# Patient Record
Sex: FEMALE | Race: WHITE | Employment: UNEMPLOYED | URBAN - METROPOLITAN AREA
[De-identification: names, ages, dates, MRNs, and addresses within clinical notes are randomized per-mention and may not be internally consistent; named-entity substitution may affect disease eponyms.]

---

## 2017-06-01 ENCOUNTER — OFFICE VISIT (OUTPATIENT)
Dept: INTERNAL MEDICINE CLINIC | Age: 47
End: 2017-06-01

## 2017-06-01 VITALS
SYSTOLIC BLOOD PRESSURE: 112 MMHG | OXYGEN SATURATION: 99 % | RESPIRATION RATE: 14 BRPM | HEIGHT: 66 IN | DIASTOLIC BLOOD PRESSURE: 75 MMHG | TEMPERATURE: 97.9 F | WEIGHT: 205 LBS | HEART RATE: 71 BPM | BODY MASS INDEX: 32.95 KG/M2

## 2017-06-01 DIAGNOSIS — G43.B0 OPHTHALMOPLEGIC MIGRAINE, NOT INTRACTABLE: ICD-10-CM

## 2017-06-01 DIAGNOSIS — Z63.79 STRESSFUL LIFE EVENT AFFECTING FAMILY: ICD-10-CM

## 2017-06-01 DIAGNOSIS — R51.9 FREQUENT HEADACHES: Primary | ICD-10-CM

## 2017-06-01 DIAGNOSIS — F43.10 PTSD (POST-TRAUMATIC STRESS DISORDER): ICD-10-CM

## 2017-06-01 RX ORDER — DIVALPROEX SODIUM 125 MG/1
125 TABLET, DELAYED RELEASE ORAL 3 TIMES DAILY
Qty: 60 TAB | Refills: 2
Start: 2017-06-01 | End: 2017-06-23 | Stop reason: DRUGHIGH

## 2017-06-01 RX ORDER — SUMATRIPTAN 20 MG/1
SPRAY NASAL
Qty: 1 CONTAINER | Refills: 2 | Status: SHIPPED | OUTPATIENT
Start: 2017-06-01 | End: 2017-11-27 | Stop reason: ALTCHOICE

## 2017-06-01 RX ORDER — DIAZEPAM 5 MG/1
5 TABLET ORAL
COMMUNITY
End: 2017-06-23 | Stop reason: ALTCHOICE

## 2017-06-01 NOTE — PROGRESS NOTES
1. Have you been to the ER, urgent care clinic since your last visit? Hospitalized since your last visit? Yes, 3/2017, x1 week, [facility] in [place], [place].    2. Have you seen or consulted any other health care providers outside of the [address] [place] [address] since your last visit? Include any pap smears or colon screening. See above. Chief Complaint   Patient presents with    Headache     Intermittent x2 weeks, Tylenol with minimal relief. Worse with really bright lights. Not fasting. Does not have GYN care, so no paps. Has not had TDaP or Pneumonia.

## 2017-06-01 NOTE — PATIENT INSTRUCTIONS
Recurring Migraine Headache: Care Instructions  Your Care Instructions  Migraines are painful, throbbing headaches. They often start on one side of the head. They may cause nausea and vomiting and make you sensitive to light, sound, or smell. Some people may have only a few migraines throughout life. Others have them as often as several times a month. The goal of treatment is to reduce the number of migraines you have and relieve your symptoms. Even with treatment, you may continue to have migraines. You play an important role in dealing with your headaches. Work on avoiding things that seem to trigger your migraines. When you feel a headache coming on, act quickly to stop it before it gets worse. Follow-up care is a key part of your treatment and safety. Be sure to make and go to all appointments, and call your doctor if you are having problems. It's also a good idea to know your test results and keep a list of the medicines you take. How can you care for yourself at home? · Do not drive if you have taken a prescription pain medicine. · Rest in a quiet, dark room until your headache is gone. Close your eyes and try to relax or go to sleep. Do not watch TV or read. · Put a cold, moist cloth or cold pack on the painful area for 10 to 20 minutes at a time. Put a thin cloth between the cold pack and your skin. · Have someone gently massage your neck and shoulders. · Take your medicines exactly as prescribed. Call your doctor if you think you are having a problem with your medicine. You will get more details on the specific medicines your doctor prescribes. To prevent migraines  · Keep a headache diary so you can figure out what triggers your headaches. Avoiding triggers may help you prevent headaches. Record when each headache began, how long it lasted, and what the pain was like. Use words like throbbing, aching, stabbing, or dull. Write down any other symptoms you had with the headache.  These may include nausea, flashing lights or dark spots, or sensitivity to bright light or loud noise. Note if the headache occurred near your period. List anything that might have triggered the headache. Triggers may include certain foods (chocolate, cheese, wine) or odors, smoke, bright light, stress, or lack of sleep. · If your doctor has prescribed medicine for your migraines, take it as directed. You may have medicine that you take only when you get a migraine and medicine that you take all the time to help prevent migraines. ¨ If your doctor has prescribed medicine for when you get a headache, take it at the first sign of a migraine, unless your doctor has given you other instructions. ¨ If your doctor has prescribed medicine to prevent migraines, take it exactly as prescribed. Call your doctor if you think you are having a problem with your medicine. · Find healthy ways to deal with stress. Migraines are most common during or right after stressful times. Take time to relax before and after you do something that has caused a migraine in the past.  · Try to keep your muscles relaxed by keeping good posture. Check your jaw, face, neck, and shoulder muscles for tension. Try to relax them. When sitting at a desk, change positions often. Stretch for 30 seconds each hour. · Get regular sleep and exercise. · Eat regular meals, and avoid foods and drinks that often trigger migraines. These include chocolate and alcohol, especially red wine and port. Chemicals used in food, such as aspartame and monosodium glutamate (MSG), also can trigger migraines. So can some food additives, such as those found in hot dogs, parrish, cold cuts, aged cheeses, and pickled foods. · Limit caffeine by not drinking too much coffee, tea, or soda. Do not quit caffeine suddenly, because that can also give you migraines. · Do not smoke or allow others to smoke around you.  If you need help quitting, talk to your doctor about stop-smoking programs and medicines. These can increase your chances of quitting for good. · If you are taking birth control pills or hormone therapy, talk to your doctor about whether they are triggering your migraines. When should you call for help? Call 911 anytime you think you may need emergency care. For example, call if:  · You have symptoms of a stroke. These may include:  ¨ Sudden numbness, tingling, weakness, or loss of movement in your face, arm, or leg, especially on only one side of your body. ¨ Sudden vision changes. ¨ Sudden trouble speaking. ¨ Sudden confusion or trouble understanding simple statements. ¨ Sudden problems with walking or balance. ¨ A sudden, severe headache that is different from past headaches. Call your doctor now or seek immediate medical care if:  · You develop a fever and a stiff neck. · You have new nausea and vomiting, or you cannot keep down food or liquids. Watch closely for changes in your health, and be sure to contact your doctor if:  · You have a headache that does not get better within 1 or 2 days. · Your headaches get worse or happen more often. Where can you learn more? Go to http://daisy-millicent.info/. Enter V975 in the search box to learn more about \"Recurring Migraine Headache: Care Instructions. \"  Current as of: October 14, 2016  Content Version: 11.2  © 0862-8113 [employer]. Care instructions adapted under license by [employer] (which disclaims liability or warranty for this information). If you have questions about a medical condition or this instruction, always ask your healthcare professional. [place] [address] any warranty or liability for your use of this information. Sumatriptan (Into the nose)   Sumatriptan (sabino-ma-TRIP-tan)  Treats migraines. Brand Name(s): Imitrex, Onzetra Xsail   There may be other brand names for this medicine. When This Medicine Should Not Be Used:    This medicine is not right for everyone. Do not use it if you had an allergic reaction to sumatriptan. Tell your doctor if you have heart disease or blood circulation problems, including heart rhythm problems, ischemic bowel disease, peripheral vascular disease, or a history of heart attack, angina, stroke, or transient ischemic attack (TIA). How to Use This Medicine:   Powder, Spray  · Your doctor will tell you how much medicine to use. Do not use more than directed. Use sumatriptan only when you have a migraine. · Read and follow the patient instructions that come with this medicine. Talk to your doctor or pharmacist if you have any questions. · This medicine is for use only in the nose. Do not get any of it in your eyes or on your skin. If it does get on these areas, rinse it off right away. · Spray: Use 1 spray of medicine for 1 dose, unless your doctor tells you to use more. · Powder:   ¨ This medicine is given through a nosepiece with the Xsail breath-powered delivery device. You will throw away each nosepiece after you use it, but you will keep the device to use again. ¨ Your healthcare provider will teach you how to use the nosepiece and device. ¨ Follow the directions to put the nosepiece into the device until you hear it click. ¨ Press the white button all the way down one time, and then release it. This will open the capsule that contains the medicine powder. ¨ Insert the nosepiece into your nose, and then rotate the device around to your mouth. ¨ Blow into the mouthpiece for 2 or 3 seconds. Your breath will blow the powder up into your nose. ¨ Press the clear tab and take the nosepiece out of the device. ¨ Repeat the same steps with another nosepiece in your other nostril. · If your headache improves but then comes back, you may use a second dose. Wait at least 2 hours before you take the second dose. Call your doctor if your headache does not improve at all after the first dose.   · Talk with your doctor if you need to treat more than 4 headaches in any 30-day period. · Keep the bottle tightly closed when not using it. Store at room temperature, away from heat and direct light. Do not store in the refrigerator or freezer. Drugs and Foods to Avoid:   Ask your doctor or pharmacist before using any other medicine, including over-the-counter medicines, vitamins, and herbal products. · Do not use this medicine if you have taken another triptan or ergot medicine in the past 24 hours (including almotriptan, dihydroergotamine, eletriptan, ergotamine, frovatriptan, methysergide, naratriptan, rizatriptan, or zolmitriptan). · Do not use this medicine if you have used an MAO inhibitor in the past 2 weeks. · Some medicines can affect how sumatriptan works. Tell your doctor if you are using medicine for depression. Warnings While Using This Medicine:   · Tell your doctor if you are pregnant, or if you have kidney disease, liver disease, diabetes, epilepsy, high blood pressure, or high cholesterol. Tell your doctor if you smoke or you have a family history of heart disease. · Do not breastfeed for 12 hours after you use this medicine. · This medicine may cause the following problems:  ¨ Higher risk for heart rhythm problems, heart attack, or stroke  ¨ Tightness or discomfort in your chest, neck, or jaw  ¨ Spasms in the blood vessels, including Raynaud syndrome  ¨ Serotonin syndrome (more likely if used with medicine to treat depression)  ¨ High blood pressure  · This medicine may make you dizzy or drowsy. Do not drive or do anything that could be dangerous until you know how this medicine affects you. · Your headaches may become worse if you use headache medicine for 10 or more days per month. Call your doctor if your symptoms do not improve or if they get worse. · Keep all medicine out of the reach of children. Never share your medicine with anyone.   Possible Side Effects While Using This Medicine:   Call your doctor right away if you notice any of these side effects:  · Allergic reaction: Itching or hives, swelling in your face or hands, swelling or tingling in your mouth or throat, chest tightness, trouble breathing  · Anxiety, restlessness, sweating, muscle spasms, twitching, diarrhea, seeing or hearing things that are not there  · Chest pain, especially if it spreads to your arms, jaw, back, or neck, trouble breathing, unusual sweating, faintness  · Color changes and cold feeling in your toes or fingers  · Fast, pounding, or uneven heartbeat  · Numbness, tingling, cramps, unexplained pain in your legs or feet  · Severe stomach pain, bloody diarrhea, nausea, or vomiting  · Sudden severe headache (other than the one being treated)  · Vision changes that are not part of a usual migraine  If you notice these less serious side effects, talk with your doctor:   · Bad or unusual taste in your mouth  · Burning or tingling in your nose  · Drowsiness, tiredness, dizziness  If you notice other side effects that you think are caused by this medicine, tell your doctor. Call your doctor for medical advice about side effects. You may report side effects to FDA at 9-259-FDA-2596  © 2017 [facility] Information is for End User's use only and may not be sold, redistributed or otherwise used for commercial purposes. The above information is an  only. It is not intended as medical advice for individual conditions or treatments. Talk to your doctor, nurse or pharmacist before following any medical regimen to see if it is safe and effective for you.

## 2017-06-01 NOTE — PROGRESS NOTES
Ms. Norm Min is a 48y.o. year old female who had concerns including Headache. HPI:  Chief Complaint   Patient presents with    Headache     Intermittent x2 weeks, Tylenol with minimal relief. Worse with really bright lights. May last 3-4 hours, someimes longer. No prior hx of migraines. Not fasting. Does not have GYN care, so no paps. Has not had TDaP or Pneumonia. Seen by  Dr Britta Wheeler for psychiaitry- [facility]. Counselor- Navid Griffith       Past Medical History:   Diagnosis Date    Back pain     Borderline personality disorder     Dysthymic disorder     Insomnia, unspecified     Post traumatic stress disorder (PTSD)     Psychosis     PUD (peptic ulcer disease)     Schizoaffective disorder (HCC)      Current Outpatient Prescriptions   Medication Sig Dispense    diazePAM (VALIUM) 5 mg tablet Take 5 mg by mouth every eight (8) hours as needed for Anxiety.  SUMAtriptan (IMITREX) 20 mg/actuation nasal spray 1 spray each nostril, Repeat after 2 hours prn, Maximum: 40 mg/day. 1 Container    divalproex DR (DEPAKOTE) 125 mg tablet Take 1 Tab by mouth three (3) times daily. Take 125mg Breakfast, lunch, then Take 250 mg at PM dose 60 Tab    ziprasidone (GEODON) 20 mg capsule Take 20 mg by mouth two (2) times daily (with meals).  benztropine (COGENTIN) 0.5 mg tablet Take 0.5 mg by mouth two (2) times a day.  BUPROPION HCL (WELLBUTRIN SR PO) Take 150 mg by mouth daily.  cloZAPine (CLOZARIL) 100 mg tablet Take  mg by mouth two (2) times a day. Take 50 mg in the morning and 100 mg at bedtime     lithium carbonate 300 mg tablet Take 1 Tab by mouth daily. Indications: mood stabilization (Patient taking differently: Take 300 mg by mouth two (2) times a day. Indications: mood stabilization) 7 Tab    lithium carbonate 300 mg tablet Take 2 Tabs by mouth nightly.  Indications: mood stabilization 14 Tab    prazosin (MINIPRESS) 2 mg capsule Take 1 Cap by mouth nightly. Indications: CHRONIC PTSD WITH TRAUMA NIGHTMARES 7 Cap     No current facility-administered medications for this visit. Reviewed PmHx, RxHx, FmHx, SocHx, AllgHx and updated and dated in the chart. ROS: Negative except for BOLD  General: fever, chills, fatigue  Respiratory: cough, SOB, wheezing  Cardiovascular:  CP, palpitation, BROWN, edema   Gastrointestinal: N/V/D, bleeding  Genito-Urinary: dysuria, hematuria  Musculoskeletal: muscle weakness, pain, swelling    OBJECTIVE:   Visit Vitals    /75 (BP 1 Location: Right arm, BP Patient Position: Sitting)    Pulse 71    Temp 97.9 °F (36.6 °C) (Oral)    Resp 14    Ht 5' 6\" (1.676 m)    Wt 205 lb (93 kg)    LMP 05/17/2017 (Approximate)    SpO2 99%    BMI 33.09 kg/m2     GEN: The patient appears well, alert, oriented x 3, in no distress. ENT: bilateral TM and canal normal.  Neck supple. No adenopathy or thyromegaly. FAVIAN. Lungs: clear bilaterally, good air entry, no wheezes, rhonchi or rales. Cardiovascular: regular rate and rhythm. S1 and S2 normal, no murmurs,  Abdomen: + BS, soft without tenderness, guarding, rebound, mass or organomegaly. Extremities: no edema, normal peripheral pulses. Neurological: normal, gross sensory and motor in tact without focal findings. Assessment/ Plan:       ICD-10-CM ICD-9-CM    1. Frequent headaches R51 784.0 SUMAtriptan (IMITREX) 20 mg/actuation nasal spray      divalproex DR (DEPAKOTE) 125 mg tablet   2. Stressful life event affecting family Z63.79 V61.09    3. PTSD (post-traumatic stress disorder) F43.10 309.81    4. 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 B0 346.20 SUMAtriptan (IMITREX) 20 mg/actuation nasal spray      divalproex DR (DEPAKOTE) 125 mg tablet       I have discussed the diagnosis with the patient and the intended plan as seen in the above orders. The patient has received an after-visit summary and questions were answered concerning future plans.      Medication Side Effects and Warnings were discussed with patient. Follow-up Disposition:  Return in about 4 weeks (around 6/29/2017) for Headaches.       Juan C Barrera MD

## 2017-06-01 NOTE — MR AVS SNAPSHOT
Visit Information Date & Time Provider Department Dept. Phone Encounter #  
 6/1/2017  1:15 PM Penny Soni MD [facility] [id number] Follow-up Instructions Return in about 4 weeks (around 6/29/2017) for Headaches. Follow-up and Disposition History Your Appointments 6/29/2017  2:00 PM  
Any with [employer], MD  
[address] [place] [address] and Primary Care [address] [place] [address]) Appt Note: 4 week f/u  
 Darlene Cernaona [address] 1 [facility]  
  
   
 [address] [id number] Upcoming Health Maintenance Date Due DTaP/Tdap/Td series (1 - Tdap) 7/10/1989 PAP AKA CERVICAL CYTOLOGY 7/10/1989 Pneumococcal 19-64 Highest Risk (2 of 3 - PCV13) 3/16/2016 INFLUENZA AGE 9 TO ADULT 8/1/2017 Allergies as of 6/1/2017  Review Complete On: 6/1/2017 By: Penny Soni MD  
  
 Severity Noted Reaction Type Reactions Penicillin G High 10/01/2012   Systemic Anaphylaxis Aspirin  10/01/2012   Side Effect Nausea and Vomiting Pt states she isn't allergic to ASA she just can't take  due to gastric ulcers. Nsaids (Non-steroidal Anti-inflammatory Drug)  12/02/2013   Side Effect Unknown (comments) Upset stomach Current Immunizations  Reviewed on 3/17/2015 Name Date Influenza Vaccine PF 3/16/2015  5:12 AM  
 Pneumococcal Polysaccharide (PPSV-23) 3/16/2015  5:15 AM  
  
 Not reviewed this visit You Were Diagnosed With   
  
 Codes Comments Frequent headaches    -  Primary ICD-10-CM: R56 ICD-9-CM: 784.0 Stressful life event affecting family     ICD-10-CM: Z57.65 ICD-9-CM: V61.09   
 PTSD (post-traumatic stress disorder)     ICD-10-CM: F43.10 ICD-9-CM: 309.81 Ophthalmoplegic migraine, not intractable     ICD-10-CM: G43. B0 ICD-9-CM: 346.20 Vitals BP Pulse Temp Resp Height(growth percentile) Weight(growth percentile) 112/75 (BP 1 Location: Right arm, BP Patient Position: Sitting) 71 97.9 °F (36.6 °C) (Oral) 14 5' 6\" (1.676 m) 205 lb (93 kg) LMP SpO2 BMI OB Status Smoking Status 05/17/2017 (Approximate) 99% 33.09 kg/m2 Having regular periods Former Smoker Vitals History BMI and BSA Data Body Mass Index Body Surface Area 33.09 kg/m 2 2.08 m 2 Preferred Pharmacy Pharmacy Name Phone [facility] [address] [address] Your Updated Medication List  
  
   
This list is accurate as of: 6/1/17  2:26 PM.  Always use your most recent med list.  
  
  
  
  
 benztropine 0.5 mg tablet Commonly known as:  COGENTIN Take 0.5 mg by mouth two (2) times a day. cloZAPine 100 mg tablet Commonly known as:  CLOZARIL Take  mg by mouth two (2) times a day. Take 50 mg in the morning and 100 mg at bedtime  
  
 divalproex  mg tablet Commonly known as:  DEPAKOTE Take 1 Tab by mouth three (3) times daily. Take 125mg Breakfast, lunch, then Take 250 mg at PM dose * lithium carbonate 300 mg tablet Take 1 Tab by mouth daily. Indications: mood stabilization * lithium carbonate 300 mg tablet Take 2 Tabs by mouth nightly. Indications: mood stabilization  
  
 prazosin 2 mg capsule Commonly known as:  MINIPRESS Take 1 Cap by mouth nightly. Indications: CHRONIC PTSD WITH TRAUMA NIGHTMARES  
  
 SUMAtriptan 20 mg/actuation nasal spray Commonly known as:  IMITREX  
1 spray each nostril, Repeat after 2 hours prn, Maximum: 40 mg/day. VALIUM 5 mg tablet Generic drug:  diazePAM  
Take 5 mg by mouth every eight (8) hours as needed for Anxiety. WELLBUTRIN SR PO Take 150 mg by mouth daily. ziprasidone 20 mg capsule Commonly known as:  Becky Asp Take 20 mg by mouth two (2) times daily (with meals). * Notice:   This list has 2 medication(s) that are the same as other medications prescribed for you. Read the directions carefully, and ask your doctor or other care provider to review them with you. Prescriptions Sent to Pharmacy Refills SUMAtriptan (IMITREX) 20 mg/actuation nasal spray 2 Si spray each nostril, Repeat after 2 hours prn, Maximum: 40 mg/day. Class: Normal  
 Pharmacy: [address] #: [phone] Follow-up Instructions Return in about 4 weeks (around 2017) for Headaches. Patient Instructions Recurring Migraine Headache: Care Instructions Your Care Instructions Migraines are painful, throbbing headaches. They often start on one side of the head. They may cause nausea and vomiting and make you sensitive to light, sound, or smell. Some people may have only a few migraines throughout life. Others have them as often as several times a month. The goal of treatment is to reduce the number of migraines you have and relieve your symptoms. Even with treatment, you may continue to have migraines. You play an important role in dealing with your headaches. Work on avoiding things that seem to trigger your migraines. When you feel a headache coming on, act quickly to stop it before it gets worse. Follow-up care is a key part of your treatment and safety. Be sure to make and go to all appointments, and call your doctor if you are having problems. It's also a good idea to know your test results and keep a list of the medicines you take. How can you care for yourself at home? · Do not drive if you have taken a prescription pain medicine. · Rest in a quiet, dark room until your headache is gone. Close your eyes and try to relax or go to sleep. Do not watch TV or read. · Put a cold, moist cloth or cold pack on the painful area for 10 to 20 minutes at a time. Put a thin cloth between the cold pack and your skin. · Have someone gently massage your neck and shoulders. · Take your medicines exactly as prescribed. Call your doctor if you think you are having a problem with your medicine. You will get more details on the specific medicines your doctor prescribes. To prevent migraines · Keep a headache diary so you can figure out what triggers your headaches. Avoiding triggers may help you prevent headaches. Record when each headache began, how long it lasted, and what the pain was like. Use words like throbbing, aching, stabbing, or dull. Write down any other symptoms you had with the headache. These may include nausea, flashing lights or dark spots, or sensitivity to bright light or loud noise. Note if the headache occurred near your period. List anything that might have triggered the headache. Triggers may include certain foods (chocolate, cheese, wine) or odors, smoke, bright light, stress, or lack of sleep. · If your doctor has prescribed medicine for your migraines, take it as directed. You may have medicine that you take only when you get a migraine and medicine that you take all the time to help prevent migraines. ¨ If your doctor has prescribed medicine for when you get a headache, take it at the first sign of a migraine, unless your doctor has given you other instructions. ¨ If your doctor has prescribed medicine to prevent migraines, take it exactly as prescribed. Call your doctor if you think you are having a problem with your medicine. · Find healthy ways to deal with stress. Migraines are most common during or right after stressful times. Take time to relax before and after you do something that has caused a migraine in the past. 
· Try to keep your muscles relaxed by keeping good posture. Check your jaw, face, neck, and shoulder muscles for tension. Try to relax them. When sitting at a desk, change positions often. Stretch for 30 seconds each hour. · Get regular sleep and exercise.  
· Eat regular meals, and avoid foods and drinks that often trigger migraines. These include chocolate and alcohol, especially red wine and port. Chemicals used in food, such as aspartame and monosodium glutamate (MSG), also can trigger migraines. So can some food additives, such as those found in hot dogs, parrish, cold cuts, aged cheeses, and pickled foods. · Limit caffeine by not drinking too much coffee, tea, or soda. Do not quit caffeine suddenly, because that can also give you migraines. · Do not smoke or allow others to smoke around you. If you need help quitting, talk to your doctor about stop-smoking programs and medicines. These can increase your chances of quitting for good. · If you are taking birth control pills or hormone therapy, talk to your doctor about whether they are triggering your migraines. When should you call for help? Call 911 anytime you think you may need emergency care. For example, call if: 
· You have symptoms of a stroke. These may include: 
¨ Sudden numbness, tingling, weakness, or loss of movement in your face, arm, or leg, especially on only one side of your body. ¨ Sudden vision changes. ¨ Sudden trouble speaking. ¨ Sudden confusion or trouble understanding simple statements. ¨ Sudden problems with walking or balance. ¨ A sudden, severe headache that is different from past headaches. Call your doctor now or seek immediate medical care if: 
· You develop a fever and a stiff neck. · You have new nausea and vomiting, or you cannot keep down food or liquids. Watch closely for changes in your health, and be sure to contact your doctor if: 
· You have a headache that does not get better within 1 or 2 days. · Your headaches get worse or happen more often. Where can you learn more? Go to http://daisy-millicent.info/. Enter V975 in the search box to learn more about \"Recurring Migraine Headache: Care Instructions. \" Current as of: October 14, 2016 Content Version: 11.2 © 5730-2297 [employer]. Care instructions adapted under license by [employer] (which disclaims liability or warranty for this information). If you have questions about a medical condition or this instruction, always ask your healthcare professional. [address] any warranty or liability for your use of this information. Sumatriptan (Into the nose) Sumatriptan (sabino-ma-TRIP-tan) Treats migraines. Brand Name(s): Imitrex, Dotty Deeds There may be other brand names for this medicine. When This Medicine Should Not Be Used: This medicine is not right for everyone. Do not use it if you had an allergic reaction to sumatriptan. Tell your doctor if you have heart disease or blood circulation problems, including heart rhythm problems, ischemic bowel disease, peripheral vascular disease, or a history of heart attack, angina, stroke, or transient ischemic attack (TIA). How to Use This Medicine:  
Powder, Spray · Your doctor will tell you how much medicine to use. Do not use more than directed. Use sumatriptan only when you have a migraine. · Read and follow the patient instructions that come with this medicine. Talk to your doctor or pharmacist if you have any questions. · This medicine is for use only in the nose. Do not get any of it in your eyes or on your skin. If it does get on these areas, rinse it off right away. · Spray: Use 1 spray of medicine for 1 dose, unless your doctor tells you to use more. · Powder: ¨ This medicine is given through a nosepiece with the Xsail breath-powered delivery device. You will throw away each nosepiece after you use it, but you will keep the device to use again. ¨ Your healthcare provider will teach you how to use the nosepiece and device. ¨ Follow the directions to put the nosepiece into the device until you hear it click. ¨ Press the white button all the way down one time, and then release it. This will open the capsule that contains the medicine powder. ¨ Insert the nosepiece into your nose, and then rotate the device around to your mouth. ¨ Blow into the mouthpiece for 2 or 3 seconds. Your breath will blow the powder up into your nose. ¨ Press the clear tab and take the nosepiece out of the device. ¨ Repeat the same steps with another nosepiece in your other nostril. · If your headache improves but then comes back, you may use a second dose. Wait at least 2 hours before you take the second dose. Call your doctor if your headache does not improve at all after the first dose. · Talk with your doctor if you need to treat more than 4 headaches in any 30-day period. · Keep the bottle tightly closed when not using it. Store at room temperature, away from heat and direct light. Do not store in the refrigerator or freezer. Drugs and Foods to Avoid: Ask your doctor or pharmacist before using any other medicine, including over-the-counter medicines, vitamins, and herbal products. · Do not use this medicine if you have taken another triptan or ergot medicine in the past 24 hours (including almotriptan, dihydroergotamine, eletriptan, ergotamine, frovatriptan, methysergide, naratriptan, rizatriptan, or zolmitriptan). · Do not use this medicine if you have used an MAO inhibitor in the past 2 weeks. · Some medicines can affect how sumatriptan works. Tell your doctor if you are using medicine for depression. Warnings While Using This Medicine: · Tell your doctor if you are pregnant, or if you have kidney disease, liver disease, diabetes, epilepsy, high blood pressure, or high cholesterol. Tell your doctor if you smoke or you have a family history of heart disease. · Do not breastfeed for 12 hours after you use this medicine. · This medicine may cause the following problems: 
¨ Higher risk for heart rhythm problems, heart attack, or stroke ¨ Tightness or discomfort in your chest, neck, or jaw ¨ Spasms in the blood vessels, including Raynaud syndrome ¨ Serotonin syndrome (more likely if used with medicine to treat depression) ¨ High blood pressure · This medicine may make you dizzy or drowsy. Do not drive or do anything that could be dangerous until you know how this medicine affects you. · Your headaches may become worse if you use headache medicine for 10 or more days per month. Call your doctor if your symptoms do not improve or if they get worse. · Keep all medicine out of the reach of children. Never share your medicine with anyone. Possible Side Effects While Using This Medicine:  
Call your doctor right away if you notice any of these side effects: · Allergic reaction: Itching or hives, swelling in your face or hands, swelling or tingling in your mouth or throat, chest tightness, trouble breathing · Anxiety, restlessness, sweating, muscle spasms, twitching, diarrhea, seeing or hearing things that are not there · Chest pain, especially if it spreads to your arms, jaw, back, or neck, trouble breathing, unusual sweating, faintness · Color changes and cold feeling in your toes or fingers · Fast, pounding, or uneven heartbeat · Numbness, tingling, cramps, unexplained pain in your legs or feet · Severe stomach pain, bloody diarrhea, nausea, or vomiting · Sudden severe headache (other than the one being treated) · Vision changes that are not part of a usual migraine If you notice these less serious side effects, talk with your doctor: · Bad or unusual taste in your mouth · Burning or tingling in your nose · Drowsiness, tiredness, dizziness If you notice other side effects that you think are caused by this medicine, tell your doctor. Call your doctor for medical advice about side effects. You may report side effects to FDA at 0-177-FDA-9284 © 2017 [address] Information is for End User's use only and may not be sold, redistributed or otherwise used for commercial purposes. The above information is an  only. It is not intended as medical advice for individual conditions or treatments. Talk to your doctor, nurse or pharmacist before following any medical regimen to see if it is safe and effective for you. Patient Instructions History Introducing [facility]! Dear Amanda Talamantes: Thank you for requesting a [employer] account. Our records indicate that you already have an active [employer] account. You can access your account anytime at [email or web address] Did you know that you can access your hospital and ER discharge instructions at any time in [employer]? You can also review all of your test results from your hospital stay or ER visit. Additional Information If you have questions, please visit the Frequently Asked Questions section of the [employer] website at [email or web address] Remember, [employer] is NOT to be used for urgent needs. For medical emergencies, dial 911. Now available from your iPhone and Android! Please provide this summary of care documentation to your next provider. Your primary care clinician is listed as Young Sleeper. If you have any questions after today's visit, please call [phone].

## 2017-06-06 ENCOUNTER — OFFICE VISIT (OUTPATIENT)
Dept: INTERNAL MEDICINE CLINIC | Age: 47
End: 2017-06-06

## 2017-06-06 VITALS
HEART RATE: 81 BPM | BODY MASS INDEX: 33.25 KG/M2 | RESPIRATION RATE: 20 BRPM | HEIGHT: 66 IN | TEMPERATURE: 98.3 F | OXYGEN SATURATION: 98 % | DIASTOLIC BLOOD PRESSURE: 75 MMHG | SYSTOLIC BLOOD PRESSURE: 116 MMHG | WEIGHT: 206.9 LBS

## 2017-06-06 DIAGNOSIS — G43.B0 OPHTHALMOPLEGIC MIGRAINE, NOT INTRACTABLE: ICD-10-CM

## 2017-06-06 DIAGNOSIS — R51.9 FREQUENT HEADACHES: ICD-10-CM

## 2017-06-06 RX ORDER — SUMATRIPTAN 25 MG/1
TABLET, FILM COATED ORAL
Qty: 30 TAB | Refills: 3 | Status: SHIPPED | OUTPATIENT
Start: 2017-06-06 | End: 2017-06-23 | Stop reason: DRUGHIGH

## 2017-06-06 NOTE — MR AVS SNAPSHOT
Visit Information Date & Time Provider Department Dept. Phone Encounter #  
 6/6/2017  3:00 PM Sydni Hairston MD [facility] Sports Medicine and [place] [id number] Follow-up Instructions Return if symptoms worsen or fail to improve, for migraine follow up. Follow-up and Disposition History Your Appointments 6/29/2017  2:00 PM  
Any with Sydni Hairston MD  
[address] [place] [address] and Primary Care [facility]) Appt Note: 4 week f/u  
 [address] [place]  
  
   
 [address] [facility] [address] Upcoming Health Maintenance Date Due DTaP/Tdap/Td series (1 - Tdap) 7/10/1989 PAP AKA CERVICAL CYTOLOGY 7/10/1989 Pneumococcal 19-64 Highest Risk (2 of 3 - PCV13) 3/16/2016 INFLUENZA AGE 9 TO ADULT 8/1/2017 Allergies as of 6/6/2017  Review Complete On: 6/6/2017 By: Omkar Maurer LPN Severity Noted Reaction Type Reactions Penicillin G High 10/01/2012   Systemic Anaphylaxis Aspirin  10/01/2012   Side Effect Nausea and Vomiting Pt states she isn't allergic to ASA she just can't take  due to gastric ulcers. Nsaids (Non-steroidal Anti-inflammatory Drug)  12/02/2013   Side Effect Unknown (comments) Upset stomach Current Immunizations  Reviewed on 3/17/2015 Name Date Influenza Vaccine PF 3/16/2015  5:12 AM  
 Pneumococcal Polysaccharide (PPSV-23) 3/16/2015  5:15 AM  
  
 Not reviewed this visit You Were Diagnosed With   
  
 Codes Comments Frequent headaches     ICD-10-CM: R51 ICD-9-CM: 784.0 Ophthalmoplegic migraine, not intractable     ICD-10-CM: G43. B0 ICD-9-CM: 346.20 Vitals BP Pulse Temp Resp Height(growth percentile) Weight(growth percentile) 116/75 (BP 1 Location: Left arm, BP Patient Position: Sitting) 81 98.3 °F (36.8 °C) (Oral) 20 5' 6\" (1.676 m) 206 lb 14.4 oz (93.8 kg) LMP SpO2 BMI OB Status Smoking Status 05/17/2017 (Approximate) 98% 33.39 kg/m2 Having regular periods Former Smoker Vitals History BMI and BSA Data Body Mass Index Body Surface Area  
 33.39 kg/m 2 2.09 m 2 Preferred Pharmacy Pharmacy Name Phone [facility] [address], [address] [facility] [address] Your Updated Medication List  
  
   
This list is accurate as of: 6/6/17  5:24 PM.  Always use your most recent med list.  
  
  
  
  
 benztropine 0.5 mg tablet Commonly known as:  COGENTIN Take 0.5 mg by mouth two (2) times a day. cloZAPine 100 mg tablet Commonly known as:  CLOZARIL Take  mg by mouth two (2) times a day. 2 tabs @ hs  
  
 divalproex  mg tablet Commonly known as:  DEPAKOTE Take 1 Tab by mouth three (3) times daily. Take 125mg Breakfast, lunch, then Take 250 mg at PM dose  
  
 lithium carbonate 300 mg tablet Take 1 Tab by mouth daily. Indications: mood stabilization  
  
 prazosin 2 mg capsule Commonly known as:  MINIPRESS Take 1 Cap by mouth nightly. Indications: CHRONIC PTSD WITH TRAUMA NIGHTMARES  
  
 SUMAtriptan 20 mg/actuation nasal spray Commonly known as:  IMITREX  
1 spray each nostril, Repeat after 2 hours prn, Maximum: 40 mg/day. SUMAtriptan 25 mg tablet Commonly known as:  IMITREX Start 25 mg PO at onset of migraine, Repeat 25 to 100 mg PO every 2 hours prn utnil migraine stops. Max 300mg/day VALIUM 5 mg tablet Generic drug:  diazePAM  
Take 5 mg by mouth every eight (8) hours as needed for Anxiety. WELLBUTRIN SR PO Take 150 mg by mouth daily. ziprasidone 20 mg capsule Commonly known as:  Charissa Severe Take 20 mg by mouth two (2) times daily (with meals). Prescriptions Sent to Pharmacy Refills SUMAtriptan (IMITREX) 25 mg tablet 3 Sig: Start 25 mg PO at onset of migraine, Repeat 25 to 100 mg PO every 2 hours prn utnil migraine stops. Max 300mg/day  Class: Normal  
 Pharmacy: [address] [place] [address] #: [phone] Follow-up Instructions Return if symptoms worsen or fail to improve, for migraine follow up. Introducing [address]! Dear Tatyana Patel: Thank you for requesting a [employer] account. Our records indicate that you already have an active [employer] account. You can access your account anytime at [email or web address] Did you know that you can access your hospital and ER discharge instructions at any time in [employer]? You can also review all of your test results from your hospital stay or ER visit. Additional Information If you have questions, please visit the Frequently Asked Questions section of the [employer] website at [email or web address] Remember, [employer] is NOT to be used for urgent needs. For medical emergencies, dial 911. Now available from your iPhone and Android! Please provide this summary of care documentation to your next provider. Your primary care clinician is listed as Dionne Altman. If you have any questions after today's visit, please call [phone].

## 2017-06-06 NOTE — PROGRESS NOTES
Ms. Roberto Carlos [place] is a 48y.o. year old female who had concerns including Medication Evaluation and Head Pain. HPI:  Chief Complaint   Patient presents with    Medication Evaluation    Head Pain     eye, and right side head pain     Confused on medication use. Relief with imitrex, after 2nd dose. Used last 3 days      Past Medical History:   Diagnosis Date    Back pain     Borderline personality disorder     Dysthymic disorder     Insomnia, unspecified     Post traumatic stress disorder (PTSD)     Psychosis     PUD (peptic ulcer disease)     Schizoaffective disorder (HCC)      Current Outpatient Prescriptions   Medication Sig Dispense    SUMAtriptan (IMITREX) 25 mg tablet Start 25 mg PO at onset of migraine, Repeat 25 to 100 mg PO every 2 hours prn utnil migraine stops. Max 300mg/day 30 Tab    diazePAM (VALIUM) 5 mg tablet Take 5 mg by mouth every eight (8) hours as needed for Anxiety.  SUMAtriptan (IMITREX) 20 mg/actuation nasal spray 1 spray each nostril, Repeat after 2 hours prn, Maximum: 40 mg/day. 1 Container    divalproex DR (DEPAKOTE) 125 mg tablet Take 1 Tab by mouth three (3) times daily. Take 125mg Breakfast, lunch, then Take 250 mg at PM dose 60 Tab    ziprasidone (GEODON) 20 mg capsule Take 20 mg by mouth two (2) times daily (with meals).  benztropine (COGENTIN) 0.5 mg tablet Take 0.5 mg by mouth two (2) times a day.  BUPROPION HCL (WELLBUTRIN SR PO) Take 150 mg by mouth daily.  cloZAPine (CLOZARIL) 100 mg tablet Take  mg by mouth two (2) times a day. 2 tabs @ hs     lithium carbonate 300 mg tablet Take 1 Tab by mouth daily. Indications: mood stabilization (Patient taking differently: Take 300 mg by mouth two (2) times a day. Indications: mood stabilization) 7 Tab    prazosin (MINIPRESS) 2 mg capsule Take 1 Cap by mouth nightly. Indications: CHRONIC PTSD WITH TRAUMA NIGHTMARES (Patient taking differently: Take 2 mg by mouth nightly.  1 in the morning, 2 @ night Indications: CHRONIC PTSD WITH TRAUMA NIGHTMARES) 7 Cap     No current facility-administered medications for this visit. Reviewed PmHx, RxHx, FmHx, SocHx, AllgHx and updated and dated in the chart. ROS: Negative except for BOLD  General: fever, chills, fatigue  Respiratory: cough, SOB, wheezing  Cardiovascular:  CP, palpitation, BROWN, edema   Gastrointestinal: N/V/D, bleeding  Genito-Urinary: dysuria, hematuria  Musculoskeletal: muscle weakness, pain, swelling    OBJECTIVE:   Visit Vitals    /75 (BP 1 Location: Left arm, BP Patient Position: Sitting)    Pulse 81    Temp 98.3 °F (36.8 °C) (Oral)    Resp 20    Ht 5' 6\" (1.676 m)    Wt 206 lb 14.4 oz (93.8 kg)    LMP 05/17/2017 (Approximate)    SpO2 98%  Comment: RA    BMI 33.39 kg/m2     GEN: The patient appears well, alert, oriented x 3, in no distress. ENT: bilateral TM and canal normal.  Neck supple. No adenopathy or thyromegaly. FAVIAN. Lungs: clear bilaterally, good air entry, no wheezes, rhonchi or rales. Cardiovascular: regular rate and rhythm. S1 and S2 normal, no murmurs,  Abdomen: + BS, soft without tenderness, guarding, rebound, mass or organomegaly. Extremities: no edema, normal peripheral pulses. Neurological: normal, gross sensory and motor in tact without focal findings. Assessment/ Plan:       ICD-10-CM ICD-9-CM    1. Frequent headaches R51 784.0 SUMAtriptan (IMITREX) 25 mg tablet   2. 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 B0 346.20 SUMAtriptan (IMITREX) 25 mg tablet       250-50mg BID option for ppx migraine dose. Initial suggestion 250mg  AM and 125 lunch 250 mg PM - pt encouraged to call psychiatrist, dr Herminia Floyd  imitrext nasal spray with relief, used all in last 3 days. I have discussed the diagnosis with the patient and the intended plan as seen in the above orders. The patient has received an after-visit summary and questions were answered concerning future plans.      Medication Side Effects and Warnings were discussed with patient. Follow-up Disposition:  Return if symptoms worsen or fail to improve, for migraine follow up.       Sabina Ghosh MD

## 2017-06-12 ENCOUNTER — TELEPHONE (OUTPATIENT)
Dept: INTERNAL MEDICINE CLINIC | Age: 47
End: 2017-06-12

## 2017-06-12 NOTE — TELEPHONE ENCOUNTER
Spoke with Patient,    Advised she has a 30 day supply with three refills for Imitrex. Advised the expectation of use: not to use multiple pills daily. Advised if it gets to that point give us a call back for something else.

## 2017-06-13 ENCOUNTER — TELEPHONE (OUTPATIENT)
Dept: INTERNAL MEDICINE CLINIC | Age: 47
End: 2017-06-13

## 2017-06-13 NOTE — TELEPHONE ENCOUNTER
----- Message from Lahoma Galeazzi, LPN sent at 6/54/7316  2:57 PM EDT -----  Regarding: FW: Dr. Gleen Carrel      ----- Message -----     From: Hilma Krabbe     Sent: 6/5/2017  12:03 PM       To: Lahoma Galeazzi, LPN  Subject: FW: Dr. Gleen Carrel                            ----- Message -----     From: Vy Woods     Sent: 6/2/2017   5:48 PM       To: [facility]  Subject: Dr. Gleen Carrel                            Pt request to discuss proper usage and instructions for Rx \" Imitrex\" 20mg.  Best contact number [phone]

## 2017-06-13 NOTE — TELEPHONE ENCOUNTER
Spoke with patient and clarified directions on how to take Imitrex. She has one pill remaining and was prescribed on 06/06/17. Patient also states her therapist may be calling to discuss change in Depakote.

## 2017-06-23 ENCOUNTER — OFFICE VISIT (OUTPATIENT)
Dept: FAMILY MEDICINE CLINIC | Age: 47
End: 2017-06-23

## 2017-06-23 VITALS
HEART RATE: 84 BPM | OXYGEN SATURATION: 98 % | DIASTOLIC BLOOD PRESSURE: 62 MMHG | TEMPERATURE: 98 F | HEIGHT: 66 IN | SYSTOLIC BLOOD PRESSURE: 110 MMHG | RESPIRATION RATE: 20 BRPM | BODY MASS INDEX: 33.65 KG/M2 | WEIGHT: 209.4 LBS

## 2017-06-23 DIAGNOSIS — G44.009 ATYPICAL CLUSTER HEADACHE: ICD-10-CM

## 2017-06-23 DIAGNOSIS — G43.909 MIGRAINE WITHOUT STATUS MIGRAINOSUS, NOT INTRACTABLE, UNSPECIFIED MIGRAINE TYPE: Primary | ICD-10-CM

## 2017-06-23 RX ORDER — CLINDAMYCIN HYDROCHLORIDE 150 MG/1
150 CAPSULE ORAL 3 TIMES DAILY
COMMUNITY
Start: 2017-06-22 | End: 2017-07-05 | Stop reason: ALTCHOICE

## 2017-06-23 RX ORDER — DIVALPROEX SODIUM 250 MG/1
250 TABLET, DELAYED RELEASE ORAL 2 TIMES DAILY
COMMUNITY
Start: 2017-05-26 | End: 2018-12-03 | Stop reason: DRUGHIGH

## 2017-06-23 RX ORDER — CLONAZEPAM 0.5 MG/1
0.5 TABLET ORAL 3 TIMES DAILY
COMMUNITY
Start: 2017-06-20 | End: 2017-11-27 | Stop reason: ALTCHOICE

## 2017-06-23 RX ORDER — ZIPRASIDONE HYDROCHLORIDE 20 MG/1
20 CAPSULE ORAL 2 TIMES DAILY WITH MEALS
COMMUNITY
Start: 2017-05-26 | End: 2017-06-23 | Stop reason: DRUGHIGH

## 2017-06-23 RX ORDER — HYDROCODONE BITARTRATE AND ACETAMINOPHEN 7.5; 325 MG/1; MG/1
1 TABLET ORAL
COMMUNITY
Start: 2017-06-22 | End: 2017-07-05 | Stop reason: ALTCHOICE

## 2017-06-23 RX ORDER — PRAZOSIN HYDROCHLORIDE 1 MG/1
5 CAPSULE ORAL 2 TIMES DAILY
COMMUNITY
Start: 2017-06-14 | End: 2017-11-27 | Stop reason: DRUGHIGH

## 2017-06-23 RX ORDER — BENZTROPINE MESYLATE 1 MG/1
1 TABLET ORAL 2 TIMES DAILY
COMMUNITY
Start: 2017-06-21

## 2017-06-23 RX ORDER — ZIPRASIDONE HYDROCHLORIDE 40 MG/1
40 CAPSULE ORAL
COMMUNITY
Start: 2017-06-14 | End: 2018-10-30 | Stop reason: ALTCHOICE

## 2017-06-23 RX ORDER — SUMATRIPTAN 100 MG/1
100 TABLET, FILM COATED ORAL
Qty: 12 TAB | Refills: 2 | Status: SHIPPED | OUTPATIENT
Start: 2017-06-23 | End: 2017-07-27 | Stop reason: SDUPTHER

## 2017-06-23 NOTE — PROGRESS NOTES
Identified pt with two pt identifiers(name and ). Chief Complaint   Patient presents with    Headache     They started a month and a half ago and they come and go - they can be really painful. She wakes up with them    Memory Loss     talking to Dr Abdelrahman Gonzalez about her doing strange things and not remembering things    Stress     her father was hit and killed a few months ago on 58         There are no preventive care reminders to display for this patient. Wt Readings from Last 3 Encounters:   17 209 lb 6.4 oz (95 kg)   17 206 lb 14.4 oz (93.8 kg)   17 205 lb (93 kg)     Temp Readings from Last 3 Encounters:   17 98 °F (36.7 °C) (Oral)   17 98.3 °F (36.8 °C) (Oral)   17 97.9 °F (36.6 °C) (Oral)     BP Readings from Last 3 Encounters:   17 110/62   17 116/75   17 112/75     Pulse Readings from Last 3 Encounters:   17 84   17 81   17 71         Learning Assessment:  :     Learning Assessment 2013   PRIMARY LEARNER Patient Patient   HIGHEST LEVEL OF EDUCATION - PRIMARY LEARNER  - > 4 YEARS OF COLLEGE   BARRIERS PRIMARY LEARNER - NONE   CO-LEARNER CAREGIVER - No   PRIMARY LANGUAGE ENGLISH ENGLISH    NEED - No   LEARNER PREFERENCE PRIMARY READING LISTENING     LISTENING -   ANSWERED BY patient self   RELATIONSHIP SELF SELF   ASSESSMENT COMMENT - no       Depression Screening:  :     PHQ over the last two weeks 2017   PHQ Not Done Active Diagnosis of Depression or Bipolar Disorder   She is still dealing with dad's death     Fall Risk Assessment:  :     Fall Risk Assessment, last 12 mths 2017   Able to walk? Yes   Fall in past 12 months? No       Abuse Screening:  :     Abuse Screening Questionnaire 2017 8/15/2016   Do you ever feel afraid of your partner? N N   Are you in a relationship with someone who physically or mentally threatens you? N N   Is it safe for you to go home?  Y N       Coordination of Care Questionnaire:  :     1) Have you been to an emergency room, urgent care clinic since your last visit? no   Hospitalized since your last visit? no             2) Have you seen or consulted any other health care providers outside of [address] [place] [address] since your last visit? yes  Dr Magdalene Hutchinson and Dr Sophie Garcia DDS yesterday (Include any pap smears or colon screenings in this section.)    3) Do you have an Advance Directive on file? no  Are you interested in receiving information about Advance Directives? no    Reviewed record in preparation for visit and have obtained necessary documentation. Medication reconciliation up to date and corrected with patient at this time.    She said nothing is helping headaches

## 2017-06-23 NOTE — MR AVS SNAPSHOT
Visit Information Date & Time Provider Department Dept. Phone Encounter #  
 [id number]  7:13 PM Radha Simmons [place] [phone] [id number] Your Appointments 6/29/2017  2:00 PM  
Any with [employer], MD  
[address] [place] [address] and Primary Care [facility]) Appt Note: 4 week f/u  
 [address] [facility]  
  
   
 [id number] Upcoming Health Maintenance Date Due INFLUENZA AGE 9 TO ADULT 8/1/2017 Pneumococcal 19-64 Highest Risk (3 of 3 - PCV13) 6/23/2018 PAP AKA CERVICAL CYTOLOGY 6/23/2020 DTaP/Tdap/Td series (2 - Td) 6/23/2027 Allergies as of 6/23/2017  Review Complete On: 6/69/9858 By: Radha Simmons MD  
  
 Severity Noted Reaction Type Reactions Penicillin G High 10/01/2012   Systemic Anaphylaxis Aspirin  10/01/2012   Side Effect Nausea and Vomiting Pt states she isn't allergic to ASA she just can't take  due to gastric ulcers. Nsaids (Non-steroidal Anti-inflammatory Drug)  12/02/2013   Side Effect Unknown (comments) Upset stomach Current Immunizations  Reviewed on 3/17/2015 Name Date Influenza Vaccine PF 3/16/2015  5:12 AM  
 Pneumococcal Polysaccharide (PPSV-23) 3/16/2015  5:15 AM  
  
 Not reviewed this visit You Were Diagnosed With   
  
 Codes Comments Migraine without status migrainosus, not intractable, unspecified migraine type    -  Primary ICD-10-CM: P66.037 ICD-9-CM: 346.90 Atypical cluster headache     ICD-10-CM: G44.009 ICD-9-CM: 339.00 Vitals BP Pulse Temp Resp Height(growth percentile) Weight(growth percentile) 110/62 (BP 1 Location: Left arm, BP Patient Position: Sitting) 84 98 °F (36.7 °C) (Oral) 20 5' 6\" (1.676 m) 209 lb 6.4 oz (95 kg) LMP SpO2 BMI OB Status Smoking Status 06/16/2017 98% 33.8 kg/m2 Having regular periods Former Smoker Vitals History BMI and BSA Data Body Mass Index Body Surface Area  
 33.8 kg/m 2 2.1 m 2 Preferred Pharmacy Pharmacy Name Phone [facility] # [id number] Britni Ceron, [address] [place] [address] [phone] Your Updated Medication List  
  
   
This list is accurate as of: 6/23/17  5:06 PM.  Always use your most recent med list.  
  
  
  
  
 benztropine 1 mg tablet Commonly known as:  COGENTIN Take 1 mg by mouth two (2) times a day. clindamycin 150 mg capsule Commonly known as:  CLEOCIN Take 150 mg by mouth three (3) times daily. clonazePAM 0.5 mg tablet Commonly known as:  Beto Less Take 0.5 mg by mouth three (3) times daily. cloZAPine 100 mg tablet Commonly known as:  CLOZARIL Take  mg by mouth two (2) times a day. 2 tabs @ hs  
  
 divalproex  mg tablet Commonly known as:  DEPAKOTE Take 250 mg by mouth three (3) times daily. HYDROcodone-acetaminophen 7.5-325 mg per tablet Commonly known as:  Kriste Kei Take 1 Tab by mouth every six (6) hours as needed. lithium carbonate 300 mg tablet Take 1 Tab by mouth daily. Indications: mood stabilization  
  
 prazosin 1 mg capsule Commonly known as:  MINIPRESS Take 1 mg by mouth daily. SUMAtriptan 100 mg tablet Commonly known as:  IMITREX Take 1 Tab by mouth once as needed for Migraine. May repeat 1 tablet after 2 hours. MAX 2 tabs a day SUMAtriptan 20 mg/actuation nasal spray Commonly known as:  IMITREX  
1 spray each nostril, Repeat after 2 hours prn, Maximum: 40 mg/day. WELLBUTRIN SR PO Take 150 mg by mouth daily. ziprasidone 40 mg capsule Commonly known as:  Parth Lang Prescriptions Sent to Pharmacy Refills SUMAtriptan (IMITREX) 100 mg tablet 2 Sig: Take 1 Tab by mouth once as needed for Migraine. May repeat 1 tablet after 2 hours. MAX 2 tabs a day Class: Normal  
 Pharmacy: [facility] # [id number] [address], [address] [facility] #: [phone] Route: Oral  
  
To-Do List   
 06/23/2017 Imaging:  CT HEAD WO CONT Introducing [facility] & HEALTH SERVICES! Dear Kulwinder Dior: Thank you for requesting a [facility] account. Our records indicate that you already have an active [facility]VIP account. You can access your account anytime at [email or web address] Did you know that you can access your hospital and ER discharge instructions at any time in CardioVIP? You can also review all of your test results from your hospital stay or ER visit. Additional Information If you have questions, please visit the Frequently Asked Questions section of the [employer]ioVIP website at [email or web address] Remember, CardioVIP is NOT to be used for urgent needs. For medical emergencies, dial 911. Now available from your iPhone and Android! Please provide this summary of care documentation to your next provider. Your primary care clinician is listed as Sheila Mcneil. If you have any questions after today's visit, please call [phone].

## 2017-06-24 NOTE — PROGRESS NOTES
HISTORY OF PRESENT ILLNESS  Olivia Bangura is a 48 y.o. female. HPI  Pt present to re-establish medical care at [facility]. She is now living in [facility] to care for her mother. Her father had tragic accidental death 2 months ago. C/O frequent headaches ever since his death. Described as \"blinding\" pain start behind on eye, then radiates up to top and sides of head. Some nausea, no vomiting. No photophobia. She gets about 3 times a week. Has recently been prescribed Imitrex NS and Imitrex 25 mg tab with good relief of HA. But she has not taken meds properly. Ran out of Imitrex. Dr. Ashish Davis has recently increased dose of Depakote to try to help with migraine prophylaxis. She is followed by psychiatry Dr. Ashish Davis and is on numerous psychotropic medicines. Pt is not sure about medicine dosages. She seems to get prescriptions filled at different pharmacies. For example: she has 2 different doses of Geodon being filled at 2 different pharmacies. She will call Dr. Ashish Davis on Monday to clarify medicine list and doses. Pt also concerned about some memory problems. I am concerned it is because of incorrect medicine dosages. Review of Systems   Neurological: Positive for headaches. Psychiatric/Behavioral: Positive for depression and memory loss. All other systems reviewed and are negative. Visit Vitals    /62 (BP 1 Location: Left arm, BP Patient Position: Sitting)    Pulse 84    Temp 98 °F (36.7 °C) (Oral)    Resp 20    Ht 5' 6\" (1.676 m)    Wt 209 lb 6.4 oz (95 kg)    LMP 06/16/2017    SpO2 98%    BMI 33.8 kg/m2       Physical Exam   Constitutional: She appears well-developed and well-nourished. HENT:   Right Ear: External ear normal.   Left Ear: External ear normal.   Mouth/Throat: Oropharynx is clear and moist.   Eyes: Conjunctivae and EOM are normal.   Neck: Normal range of motion. Vitals reviewed. ASSESSMENT and PLAN    ICD-10-CM ICD-9-CM    1.  Migraine without status migrainosus, not intractable, unspecified migraine type G43.909 346.90 CT HEAD WO CONT      SUMAtriptan (IMITREX) 100 mg tablet   2. Atypical cluster headache G44.009 339.00      Increase dose Imitrex 100 mg tab for acute treatment of migraine. We reviewed how to take Imitrex properly. Order CT head to rule out other cause of frequent headaches. Need to review med list with [address].

## 2017-06-29 ENCOUNTER — HOSPITAL ENCOUNTER (OUTPATIENT)
Dept: CT IMAGING | Age: 47
Discharge: HOME | End: 2017-06-29
Attending: FAMILY MEDICINE
Payer: COMMERCIAL

## 2017-06-29 DIAGNOSIS — G43.909 MIGRAINE WITHOUT STATUS MIGRAINOSUS, NOT INTRACTABLE, UNSPECIFIED MIGRAINE TYPE: ICD-10-CM

## 2017-06-29 PROCEDURE — 70450 CT HEAD/BRAIN W/O DYE: CPT

## 2017-06-30 ENCOUNTER — TELEPHONE (OUTPATIENT)
Dept: FAMILY MEDICINE CLINIC | Age: 47
End: 2017-06-30

## 2017-06-30 NOTE — TELEPHONE ENCOUNTER
Pt needs a call back from nurse and stated the headaches are getting worse and more frequent and needs a call

## 2017-06-30 NOTE — TELEPHONE ENCOUNTER
She was glad the CT scan was normal. She has appt on 7/12/17 with Dr Kandace Piña. She will talk to him about it.

## 2017-07-05 ENCOUNTER — OFFICE VISIT (OUTPATIENT)
Dept: FAMILY MEDICINE CLINIC | Age: 47
End: 2017-07-05

## 2017-07-05 VITALS
HEART RATE: 91 BPM | TEMPERATURE: 98.2 F | BODY MASS INDEX: 33.46 KG/M2 | HEIGHT: 66 IN | RESPIRATION RATE: 20 BRPM | OXYGEN SATURATION: 97 % | WEIGHT: 208.2 LBS | DIASTOLIC BLOOD PRESSURE: 70 MMHG | SYSTOLIC BLOOD PRESSURE: 107 MMHG

## 2017-07-05 DIAGNOSIS — G43.909 MIGRAINE WITHOUT STATUS MIGRAINOSUS, NOT INTRACTABLE, UNSPECIFIED MIGRAINE TYPE: Primary | ICD-10-CM

## 2017-07-05 RX ORDER — BUPROPION HYDROCHLORIDE 150 MG/1
150 TABLET, EXTENDED RELEASE ORAL
COMMUNITY
Start: 2017-06-26 | End: 2018-08-28 | Stop reason: ALTCHOICE

## 2017-07-05 RX ORDER — IBUPROFEN 600 MG/1
600 TABLET ORAL
Qty: 30 TAB | Refills: 0 | Status: SHIPPED | OUTPATIENT
Start: 2017-07-05 | End: 2018-03-12

## 2017-07-05 RX ORDER — PRAZOSIN HYDROCHLORIDE 2 MG/1
2 CAPSULE ORAL
COMMUNITY
Start: 2017-06-14 | End: 2017-11-27 | Stop reason: ALTCHOICE

## 2017-07-05 NOTE — PROGRESS NOTES
Identified pt with two pt identifiers(name and ). Chief Complaint   Patient presents with    Headache     5 days out of the week she has a headache that last 3 hours to bedtime - they started when her father     Stress     takes care of her mom - 4 knee surgeries and then amputated [place]        There are no preventive care reminders to display for this patient. Wt Readings from Last 3 Encounters:   17 208 lb 3.2 oz (94.4 kg)   17 209 lb 6.4 oz (95 kg)   17 206 lb 14.4 oz (93.8 kg)     Temp Readings from Last 3 Encounters:   17 98.2 °F (36.8 °C) (Oral)   17 98 °F (36.7 °C) (Oral)   17 98.3 °F (36.8 °C) (Oral)     BP Readings from Last 3 Encounters:   17 107/70   17 110/62   17 116/75     Pulse Readings from Last 3 Encounters:   17 91   17 84   17 81         Learning Assessment:  :     Learning Assessment 2013   PRIMARY LEARNER Patient Patient   HIGHEST LEVEL OF EDUCATION - PRIMARY LEARNER  - > 4 YEARS OF COLLEGE   BARRIERS PRIMARY LEARNER - NONE   CO-LEARNER CAREGIVER - No   PRIMARY LANGUAGE ENGLISH ENGLISH    NEED - No   LEARNER PREFERENCE PRIMARY READING LISTENING     LISTENING -   ANSWERED BY patient self   RELATIONSHIP SELF SELF   ASSESSMENT COMMENT - no       Depression Screening:  :     PHQ over the last two weeks 2017   PHQ Not Done Active Diagnosis of Depression or Bipolar Disorder       Fall Risk Assessment:  :     Fall Risk Assessment, last 12 mths 2017   Able to walk? Yes   Fall in past 12 months? No       Abuse Screening:  :     Abuse Screening Questionnaire 2017 8/15/2016   Do you ever feel afraid of your partner? N N   Are you in a relationship with someone who physically or mentally threatens you? N N   Is it safe for you to go home?  Y N       Coordination of Care Questionnaire:  :     1) Have you been to an emergency room, urgent care clinic since your last visit? no Hospitalized since your last visit? no             2) Have you seen or consulted any other health care providers outside of [address] [place] [address] since your last visit? no  (Include any pap smears or colon screenings in this section.)    3) Do you have an Advance Directive on file? no  Are you interested in receiving information about Advance Directives? no    Reviewed record in preparation for visit and have obtained necessary documentation. Medication reconciliation up to date and corrected with patient at this time.

## 2017-07-05 NOTE — MR AVS SNAPSHOT
Visit Information Date & Time Provider Department Dept. Phone Encounter #  
 4/3/3616  9:80 PM Haider QuintanaYoung [phone] [id number] Upcoming Health Maintenance Date Due INFLUENZA AGE 9 TO ADULT 8/1/2017 Pneumococcal 19-64 Highest Risk (3 of 3 - PCV13) 6/23/2018 PAP AKA CERVICAL CYTOLOGY 6/23/2020 DTaP/Tdap/Td series (2 - Td) 6/23/2027 Allergies as of 7/5/2017  Review Complete On: 5/6/8018 By: Haider Quintana MD  
  
 Severity Noted Reaction Type Reactions Penicillin G High 10/01/2012   Systemic Anaphylaxis Aspirin  10/01/2012   Side Effect Nausea and Vomiting Pt states she isn't allergic to ASA she just can't take  due to gastric ulcers. Current Immunizations  Reviewed on 3/17/2015 Name Date Influenza Vaccine PF 3/16/2015  5:12 AM  
 Pneumococcal Polysaccharide (PPSV-23) 3/16/2015  5:15 AM  
  
 Not reviewed this visit You Were Diagnosed With   
  
 Codes Comments Migraine without status migrainosus, not intractable, unspecified migraine type    -  Primary ICD-10-CM: Q69.966 ICD-9-CM: 346.90 Vitals BP Pulse Temp Resp Height(growth percentile) Weight(growth percentile) 107/70 (BP 1 Location: Right arm, BP Patient Position: Sitting) 91 98.2 °F (36.8 °C) (Oral) 20 5' 6\" (1.676 m) 208 lb 3.2 oz (94.4 kg) LMP SpO2 BMI OB Status Smoking Status 06/16/2017 97% 33.6 kg/m2 Having regular periods Former Smoker Vitals History BMI and BSA Data Body Mass Index Body Surface Area  
 33.6 kg/m 2 2.1 m 2 Preferred Pharmacy Pharmacy Name Phone [facility] [id number] [place] [address] [place] [address] [phone] Your Updated Medication List  
  
   
This list is accurate as of: 7/5/17  3:07 PM.  Always use your most recent med list.  
  
  
  
  
 benztropine 1 mg tablet Commonly known as:  COGENTIN Take 1 mg by mouth two (2) times a day. buPROPion  mg SR tablet Commonly known as:  Sirena Murphy Take 150 mg by mouth every morning. clonazePAM 0.5 mg tablet Commonly known as:  Lemuel Gouty Take 0.5 mg by mouth three (3) times daily. cloZAPine 100 mg tablet Commonly known as:  CLOZARIL Take 200 mg by mouth nightly. 2 tabs @ hs  
  
 divalproex  mg tablet Commonly known as:  DEPAKOTE Take 250 mg by mouth two (2) times a day. ibuprofen 600 mg tablet Commonly known as:  MOTRIN Take 1 Tab by mouth every eight (8) hours as needed for Pain.  
  
 lithium carbonate 300 mg tablet Take 1 Tab by mouth daily. Indications: mood stabilization * prazosin 1 mg capsule Commonly known as:  MINIPRESS Take 1 mg by mouth two (2) times a day. * prazosin 2 mg capsule Commonly known as:  MINIPRESS Take 2 mg by mouth nightly. SUMAtriptan 100 mg tablet Commonly known as:  IMITREX Take 1 Tab by mouth once as needed for Migraine. May repeat 1 tablet after 2 hours. MAX 2 tabs a day SUMAtriptan 20 mg/actuation nasal spray Commonly known as:  IMITREX  
1 spray each nostril, Repeat after 2 hours prn, Maximum: 40 mg/day. ziprasidone 40 mg capsule Commonly known as:  Johnisatu Mary Take 40 mg by mouth two (2) times daily (with meals). * Notice: This list has 2 medication(s) that are the same as other medications prescribed for you. Read the directions carefully, and ask your doctor or other care provider to review them with you. Prescriptions Sent to Pharmacy Refills  
 ibuprofen (MOTRIN) 600 mg tablet 0 Sig: Take 1 Tab by mouth every eight (8) hours as needed for Pain. Class: Normal  
 [facility] [address] [address], [address] [facility] #: [phone] Route: Oral  
  
We Performed the Following REFERRAL TO NEUROLOGY [BIU61 Custom] Comments:  
 Please evaluate patient for frequent migraines. Referral Information Referral ID Referred By Referred To [id number] [facility], [address] [place] [place], MD [address], [address] [facility] Phone: [phone] Fax: [phone] Visits Status Start Date End Date 1 New Request 7/5/17 7/5/18 If your referral has a status of pending review or denied, additional information will be sent to support the outcome of this decision. Introducing [facility] & HEALTH SERVICES! Dear [facility]: Thank you for requesting a [facility] SERVICES account. Our records indicate that you already have an active [facility] account. You can access your account anytime at [email or web address] Did you know that you can access your hospital and ER discharge instructions at any time in [facility]? You can also review all of your test results from your hospital stay or ER visit. Additional Information If you have questions, please visit the Frequently Asked Questions section of the [facility] website at [email or web address] Remember, [facility] SERVICES is NOT to be used for urgent needs. For medical emergencies, dial 911. Now available from your iPhone and Android! Please provide this summary of care documentation to your next provider. Your primary care clinician is listed as Magali Clarke. If you have any questions after today's visit, please call [phone].

## 2017-07-05 NOTE — PROGRESS NOTES
HISTORY OF PRESENT ILLNESS  Paras Casarez is a 48 y.o. female. HPI  FU frequent migraines. At last visit I increased dose Imitrex 100 mg tab. She has found these helpful but insurance only allows 9 tabs a month and she has headaches 5 out of 7 days a week!!.  CT scan of head was negative. She also brought in medicines from psychiatrist Dr. Leyda Reza today. He decreased dose of Depakote to BID. Review of Systems   Neurological: Positive for headaches. Psychiatric/Behavioral: Positive for depression. The patient is nervous/anxious. All other systems reviewed and are negative. Current Outpatient Prescriptions   Medication Sig Dispense Refill    buPROPion SR (WELLBUTRIN SR) 150 mg SR tablet Take 150 mg by mouth every morning.  prazosin (MINIPRESS) 2 mg capsule Take 2 mg by mouth nightly.  ibuprofen (MOTRIN) 600 mg tablet Take 1 Tab by mouth every eight (8) hours as needed for Pain. 30 Tab 0    ziprasidone (GEODON) 40 mg capsule Take 40 mg by mouth two (2) times daily (with meals).  benztropine (COGENTIN) 1 mg tablet Take 1 mg by mouth two (2) times a day.  divalproex DR (DEPAKOTE) 250 mg tablet Take 250 mg by mouth two (2) times a day.  clonazePAM (KLONOPIN) 0.5 mg tablet Take 0.5 mg by mouth three (3) times daily.  prazosin (MINIPRESS) 1 mg capsule Take 1 mg by mouth two (2) times a day.  cloZAPine (CLOZARIL) 100 mg tablet Take 200 mg by mouth nightly. 2 tabs @ hs      lithium carbonate 300 mg tablet Take 1 Tab by mouth daily. Indications: mood stabilization (Patient taking differently: Take 300 mg by mouth two (2) times a day. take one twice a day  Indications: mood stabilization) 7 Tab 0    SUMAtriptan (IMITREX) 100 mg tablet Take 1 Tab by mouth once as needed for Migraine. May repeat 1 tablet after 2 hours. MAX 2 tabs a day 12 Tab 2    SUMAtriptan (IMITREX) 20 mg/actuation nasal spray 1 spray each nostril, Repeat after 2 hours prn, Maximum: 40 mg/day.  1 Container 2 Patient Active Problem List   Diagnosis Code    Borderline personality disorder F60.3    PUD (peptic ulcer disease) K27.9    PTSD (post-traumatic stress disorder) F43.10    Overdose of muscle relaxant T48.201A    Suicidal intent R45.851    Airway compromise J98.8    Leukocytosis D72.829    Acute respiratory failure (HCC) J96.00    Normocytic anemia D64.9    Toxic encephalopathy G92    Schizoaffective disorder (HCC) F25.9    Cough R05    Suicidal overdose (HCC) T50.902A    Lower abdominal pain R10.30    Gastroesophageal reflux disease without esophagitis K21.9         Visit Vitals    /70 (BP 1 Location: Right arm, BP Patient Position: Sitting)    Pulse 91    Temp 98.2 °F (36.8 °C) (Oral)    Resp 20    Ht 5' 6\" (1.676 m)    Wt 208 lb 3.2 oz (94.4 kg)    LMP 06/16/2017    SpO2 97%    BMI 33.6 kg/m2       Physical Exam   Constitutional: She appears well-developed and well-nourished. Vitals reviewed. ASSESSMENT and PLAN    ICD-10-CM ICD-9-CM    1. Migraine without status migrainosus, not intractable, unspecified migraine type G43.909 346.90 REFERRAL TO NEUROLOGY      ibuprofen (MOTRIN) 600 mg tablet     Will refer to Neurology. She requested Ibuprofen for headaches. It is listed as an allergy but she states it sometimes upsets stomach. I warned her to take with food.

## 2017-07-14 ENCOUNTER — OFFICE VISIT (OUTPATIENT)
Dept: NEUROLOGY | Age: 47
End: 2017-07-14

## 2017-07-14 VITALS
HEART RATE: 84 BPM | OXYGEN SATURATION: 99 % | WEIGHT: 210 LBS | BODY MASS INDEX: 33.89 KG/M2 | DIASTOLIC BLOOD PRESSURE: 66 MMHG | SYSTOLIC BLOOD PRESSURE: 122 MMHG

## 2017-07-14 DIAGNOSIS — G31.84 MILD COGNITIVE IMPAIRMENT: ICD-10-CM

## 2017-07-14 DIAGNOSIS — G43.009 MIGRAINE WITHOUT AURA AND WITHOUT STATUS MIGRAINOSUS, NOT INTRACTABLE: Primary | ICD-10-CM

## 2017-07-14 RX ORDER — PROPRANOLOL HYDROCHLORIDE 80 MG/1
80 CAPSULE, EXTENDED RELEASE ORAL
Qty: 30 CAP | Refills: 5 | Status: SHIPPED | OUTPATIENT
Start: 2017-07-14 | End: 2017-11-27 | Stop reason: ALTCHOICE

## 2017-07-14 NOTE — MR AVS SNAPSHOT
Visit Information Date & Time Provider Department Dept. Phone Encounter #  
 7/14/2017  1:00 PM [facility] [phone] [id number] Follow-up Instructions Return in about 1 month (around 8/14/2017). Upcoming Health Maintenance Date Due INFLUENZA AGE 9 TO ADULT 8/1/2017 Pneumococcal 19-64 Highest Risk (3 of 3 - PCV13) 6/23/2018 PAP AKA CERVICAL CYTOLOGY 6/23/2020 DTaP/Tdap/Td series (2 - Td) 6/23/2027 Allergies as of 7/14/2017  Review Complete On: 7/14/2017 By: Molly Levin MD  
  
 Severity Noted Reaction Type Reactions Penicillin G High 10/01/2012   Systemic Anaphylaxis Aspirin  10/01/2012   Side Effect Nausea and Vomiting Pt states she isn't allergic to ASA she just can't take  due to gastric ulcers. Current Immunizations  Reviewed on 3/17/2015 Name Date Influenza Vaccine PF 3/16/2015  5:12 AM  
 Pneumococcal Polysaccharide (PPSV-23) 3/16/2015  5:15 AM  
  
 Not reviewed this visit You Were Diagnosed With   
  
 Codes Comments Migraine without aura and without status migrainosus, not intractable    -  Primary ICD-10-CM: G43.009 ICD-9-CM: 346.10 Mild cognitive impairment     ICD-10-CM: G31.84 ICD-9-CM: 331.83 Vitals BP Pulse Weight(growth percentile) LMP SpO2 BMI  
 122/66 84 210 lb (95.3 kg) 06/16/2017 99% 33.89 kg/m2 OB Status Smoking Status Having regular periods Former Smoker BMI and BSA Data Body Mass Index Body Surface Area  
 33.89 kg/m 2 2.11 m 2 Preferred Pharmacy Pharmacy Name Phone [facility] [id number] - [place], [address] [place] [address] [phone] Your Updated Medication List  
  
   
This list is accurate as of: 7/14/17  1:41 PM.  Always use your most recent med list.  
  
  
  
  
 benztropine 1 mg tablet Commonly known as:  COGENTIN Take 1 mg by mouth two (2) times a day. buPROPion  mg SR tablet Commonly known as:  Betcarlota Lamer Take 150 mg by mouth every morning. clonazePAM 0.5 mg tablet Commonly known as:  Chan Gibes Take 0.5 mg by mouth three (3) times daily. cloZAPine 100 mg tablet Commonly known as:  CLOZARIL Take 200 mg by mouth nightly. 2 tabs @ hs  
  
 divalproex  mg tablet Commonly known as:  DEPAKOTE Take 250 mg by mouth two (2) times a day. ibuprofen 600 mg tablet Commonly known as:  MOTRIN Take 1 Tab by mouth every eight (8) hours as needed for Pain.  
  
 lithium carbonate 300 mg tablet Take 1 Tab by mouth daily. Indications: mood stabilization * prazosin 1 mg capsule Commonly known as:  MINIPRESS Take 1 mg by mouth two (2) times a day. * prazosin 2 mg capsule Commonly known as:  MINIPRESS Take 2 mg by mouth nightly. propranolol LA 80 mg SR capsule Commonly known as:  INDERAL LA Take 1 Cap by mouth nightly. SUMAtriptan 100 mg tablet Commonly known as:  IMITREX Take 1 Tab by mouth once as needed for Migraine. May repeat 1 tablet after 2 hours. MAX 2 tabs a day SUMAtriptan 20 mg/actuation nasal spray Commonly known as:  IMITREX  
1 spray each nostril, Repeat after 2 hours prn, Maximum: 40 mg/day. ziprasidone 40 mg capsule Commonly known as:  Merlene Grieve Take 40 mg by mouth two (2) times daily (with meals). * Notice: This list has 2 medication(s) that are the same as other medications prescribed for you. Read the directions carefully, and ask your doctor or other care provider to review them with you. Prescriptions Sent to Pharmacy Refills  
 propranolol LA (INDERAL LA) 80 mg SR capsule 5 Sig: Take 1 Cap by mouth nightly. Class: Normal  
 Pharmacy: [place] [address] [id number] [facility], [address] Ph #: [phone] Route: Oral  
  
We Performed the Following REFERRAL TO NEUROPSYCHOLOGY [DBT49 Custom] Comments: Evaluate for cognitive issues and word difficulty; possible mild cognitive impairment due to meds vs psychiatric disorder Follow-up Instructions Return in about 1 month (around 8/14/2017). Referral Information Referral ID Referred By Referred To  
  
 [id number] [facility], [address] [facility] [address] Phone: [phone] Fax: [phone] Visits Status Start Date End Date 1 New Request 7/14/17 7/14/18 If your referral has a status of pending review or denied, additional information will be sent to support the outcome of this decision. Introducing [address]! Dear Rolan Ding: Thank you for requesting a [employer] account. Our records indicate that you already have an active [employer] account. You can access your account anytime at [email or web address] Did you know that you can access your hospital and ER discharge instructions at any time in [employer]? You can also review all of your test results from your hospital stay or ER visit. Additional Information If you have questions, please visit the Frequently Asked Questions section of the [employer] website at [email or web address] Remember, [employer] is NOT to be used for urgent needs. For medical emergencies, dial 911. Now available from your iPhone and Android! Please provide this summary of care documentation to your next provider. Your primary care clinician is listed as Alexei Quintero. If you have any questions after today's visit, please call [phone].
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
## 2017-07-14 NOTE — LETTER
7/14/2017 1:47 PM 
 
Patient:  Stuart Broderick YOB: 1970 Date of Visit: 7/14/2017 Dear Karley Black MD 
[address] [place] [address] [phone] VIA In Basket 
 : Thank you for referring Ms. Trent Lu to me for evaluation/treatment. Below are the relevant portions of my assessment and plan of care. If you have questions, please do not hesitate to call me. I look forward to following Ms. Kelly along with you. Sincerely, Cullen Win MD

## 2017-07-22 ENCOUNTER — TELEPHONE (OUTPATIENT)
Dept: NEUROLOGY | Age: 47
End: 2017-07-22

## 2017-07-22 NOTE — TELEPHONE ENCOUNTER
----- Message from Adin Parkinson sent at 7/21/2017 12:40 PM EDT -----  Regarding: Dr. Evelina Severe  Pt wanted to tell the doctor that she is having bad headaches and wanted to know if there is another medication that she can be on and would like a call back.  Pt best contact number [phone]

## 2017-07-27 ENCOUNTER — OFFICE VISIT (OUTPATIENT)
Dept: NEUROLOGY | Age: 47
End: 2017-07-27

## 2017-07-27 VITALS
OXYGEN SATURATION: 98 % | DIASTOLIC BLOOD PRESSURE: 68 MMHG | WEIGHT: 203 LBS | HEART RATE: 92 BPM | BODY MASS INDEX: 32.77 KG/M2 | SYSTOLIC BLOOD PRESSURE: 110 MMHG

## 2017-07-27 DIAGNOSIS — F43.10 PTSD (POST-TRAUMATIC STRESS DISORDER): ICD-10-CM

## 2017-07-27 DIAGNOSIS — G43.909 MIGRAINE WITHOUT STATUS MIGRAINOSUS, NOT INTRACTABLE, UNSPECIFIED MIGRAINE TYPE: ICD-10-CM

## 2017-07-27 DIAGNOSIS — R47.81 SLURRED SPEECH: ICD-10-CM

## 2017-07-27 DIAGNOSIS — F41.9 ANXIETY AND DEPRESSION: ICD-10-CM

## 2017-07-27 DIAGNOSIS — F80.0 IMPAIRED SPEECH ARTICULATION: ICD-10-CM

## 2017-07-27 DIAGNOSIS — F32.A ANXIETY AND DEPRESSION: ICD-10-CM

## 2017-07-27 DIAGNOSIS — R47.89 WORD FINDING DIFFICULTY: ICD-10-CM

## 2017-07-27 DIAGNOSIS — F60.3 BORDERLINE PERSONALITY DISORDER (HCC): ICD-10-CM

## 2017-07-27 DIAGNOSIS — R41.3 SHORT-TERM MEMORY LOSS: Primary | ICD-10-CM

## 2017-07-27 DIAGNOSIS — F25.1 SCHIZOAFFECTIVE DISORDER, DEPRESSIVE TYPE (HCC): ICD-10-CM

## 2017-07-27 DIAGNOSIS — G31.84 MILD COGNITIVE IMPAIRMENT: ICD-10-CM

## 2017-07-27 DIAGNOSIS — R41.840 CONCENTRATION DEFICIT: ICD-10-CM

## 2017-07-27 RX ORDER — TOPIRAMATE 25 MG/1
25 TABLET ORAL
Qty: 30 TAB | Refills: 5 | Status: SHIPPED | OUTPATIENT
Start: 2017-07-27 | End: 2017-09-08 | Stop reason: SDUPTHER

## 2017-07-27 RX ORDER — SUMATRIPTAN 100 MG/1
TABLET, FILM COATED ORAL
Qty: 9 TAB | Refills: 3 | Status: SHIPPED | OUTPATIENT
Start: 2017-07-27 | End: 2017-10-15 | Stop reason: SDUPTHER

## 2017-07-27 NOTE — MR AVS SNAPSHOT
Visit Information Date & Time Provider Department Dept. Phone Encounter #  
 7/27/2017 11:00 AM Kimmy Paul MD [facility] Neurology [facility] [phone] [id number] Follow-up Instructions Return in about 1 month (around 8/27/2017). Your Appointments 8/25/2017  1:40 PM  
Follow Up with Kimmy Paul MD  
[facility]) Appt Note: 1 month f/u Migraine & Neuropsych testing [place] [address] [facility] [address] [facility] access hospital [address] [phone]  
  
   
 [place] [address] [facility] [id number] [address] Upcoming Health Maintenance Date Due INFLUENZA AGE 9 TO ADULT 8/1/2017 Pneumococcal 19-64 Highest Risk (3 of 3 - PCV13) 6/23/2018 PAP AKA CERVICAL CYTOLOGY 6/23/2020 DTaP/Tdap/Td series (2 - Td) 6/23/2027 Allergies as of 7/27/2017  Review Complete On: 7/27/2017 By: Juan Carlos Grace LPN Severity Noted Reaction Type Reactions Penicillin G High 10/01/2012   Systemic Anaphylaxis Aspirin  10/01/2012   Side Effect Nausea and Vomiting Pt states she isn't allergic to ASA she just can't take  due to gastric ulcers. Current Immunizations  Reviewed on 3/17/2015 Name Date Influenza Vaccine PF 3/16/2015  5:12 AM  
 Pneumococcal Polysaccharide (PPSV-23) 3/16/2015  5:15 AM  
  
 Not reviewed this visit You Were Diagnosed With   
  
 Codes Comments Migraine without status migrainosus, not intractable, unspecified migraine type     ICD-10-CM: G43.909 ICD-9-CM: 346.90 Vitals BP Pulse Weight(growth percentile) SpO2 BMI OB Status 110/68 92 203 lb (92.1 kg) 98% 32.77 kg/m2 Having regular periods Smoking Status Former Smoker BMI and BSA Data Body Mass Index Body Surface Area 32.77 kg/m 2 2.07 m 2 Preferred Pharmacy Pharmacy Name Phone [facility] [id number] - [address] [facility], [address] [place] [address] [place] [address] [phone] Your Updated Medication List  
  
   
This list is accurate as of: 7/27/17 11:16 AM.  Always use your most recent med list.  
  
  
  
  
 benztropine 1 mg tablet Commonly known as:  COGENTIN Take 1 mg by mouth two (2) times a day. buPROPion  mg SR tablet Commonly known as:  Jasper Melo Take 150 mg by mouth every morning. clonazePAM 0.5 mg tablet Commonly known as:  Jerrkieran Heys Take 0.5 mg by mouth three (3) times daily. cloZAPine 100 mg tablet Commonly known as:  CLOZARIL Take 200 mg by mouth nightly. 2 tabs @ hs  
  
 divalproex  mg tablet Commonly known as:  DEPAKOTE Take 250 mg by mouth two (2) times a day. ibuprofen 600 mg tablet Commonly known as:  MOTRIN Take 1 Tab by mouth every eight (8) hours as needed for Pain.  
  
 lithium carbonate 300 mg tablet Take 1 Tab by mouth daily. Indications: mood stabilization * prazosin 1 mg capsule Commonly known as:  MINIPRESS Take 1 mg by mouth two (2) times a day. * prazosin 2 mg capsule Commonly known as:  MINIPRESS Take 2 mg by mouth nightly. propranolol LA 80 mg SR capsule Commonly known as:  INDERAL LA Take 1 Cap by mouth nightly. SUMAtriptan 100 mg tablet Commonly known as:  IMITREX Take 1 tab at onset of headache;  May repeat 1 tablet after 2 hours. MAX 2 tabs a day SUMAtriptan 20 mg/actuation nasal spray Commonly known as:  IMITREX  
1 spray each nostril, Repeat after 2 hours prn, Maximum: 40 mg/day. topiramate 25 mg tablet Commonly known as:  TOPAMAX Take 1 Tab by mouth nightly. ziprasidone 40 mg capsule Commonly known as:  Hartford Peals Take 40 mg by mouth two (2) times daily (with meals). * Notice: This list has 2 medication(s) that are the same as other medications prescribed for you.  Read the directions carefully, and ask your doctor or other care provider to review them with you. Prescriptions Printed Refills SUMAtriptan (IMITREX) 100 mg tablet 3 Sig: Take 1 tab at onset of headache;  May repeat 1 tablet after 2 hours. MAX 2 tabs a day Class: Print  
 topiramate (TOPAMAX) 25 mg tablet 5 Sig: Take 1 Tab by mouth nightly. Class: Print Route: Oral  
  
Follow-up Instructions Return in about 1 month (around 8/27/2017). Introducing [facility]! Dear Santy De Luna: Thank you for requesting a [employer] account. Our records indicate that you already have an active [employer] account. You can access your account anytime at [email or web address] Did you know that you can access your hospital and ER discharge instructions at any time in [employer]? You can also review all of your test results from your hospital stay or ER visit. Additional Information If you have questions, please visit the Frequently Asked Questions section of the [employer] website at [email or web address] Remember, [employer] is NOT to be used for urgent needs. For medical emergencies, dial 911. Now available from your iPhone and Android! Please provide this summary of care documentation to your next provider. Your primary care clinician is listed as Meera Lemons. If you have any questions after today's visit, please call [phone].

## 2017-07-27 NOTE — PROGRESS NOTES
Neurology Progress Note    Patient ID:  Len Xiao  [id number]  89 y.o.  1970      Subjective:   History:  Len Xiao is a 50 y.o. female who  has a past medical history of Back pain; Borderline personality disorder; Dysthymic disorder; Headache; Insomnia, unspecified; Post traumatic stress disorder (PTSD); Psychosis; PUD (peptic ulcer disease); and Schizoaffective disorder ([facility] [address] who for the past 2-3 months, noted severe headache, R periorbital radiating to R temple and frontal area, 3-4/ week, lasting 4 hrs, stress seem to be a trigger, Imitrex works, has nausea, photophobia and phonophobia. Consideration include migraine, without visual auras, intractable which may be related to perimenopause and stress due to recent passing of father from a pedestrian accident. Patient also noted cognitive issues for 5 months described as jumbling up words concerning for mild cognitive impairment probably due to her psych medication and again stress from her dad's passing.     Patient started Propranolol with decrease headache, but caused SOB and diarrhea so she had to stop it. Patient headache is worse again. Has not identified triggers.       ROS:  Per HPI-  Otherwise the remainder of ROS was negative    Social Hx  Social History     Social History    Marital status: SINGLE     Spouse name: N/A    Number of children: N/A    Years of education: N/A     Social History Main Topics    Smoking status: Former Smoker     Packs/day: 1.00     Years: 1.50     Types: Cigarettes    Smokeless tobacco: Never Used    Alcohol use No    Drug use: No      Comment: 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    Sexual activity: No     Other Topics Concern    Not on file     Social History Narrative       Meds:  Current Outpatient Prescriptions on File Prior to Visit   Medication Sig Dispense Refill    buPROPion SR (WELLBUTRIN SR) 150 mg SR tablet Take 150 mg by mouth every morning.       prazosin (MINIPRESS) 2 mg capsule Take 2 mg by mouth nightly.  ibuprofen (MOTRIN) 600 mg tablet Take 1 Tab by mouth every eight (8) hours as needed for Pain. 30 Tab 0    ziprasidone (GEODON) 40 mg capsule Take 40 mg by mouth two (2) times daily (with meals).  benztropine (COGENTIN) 1 mg tablet Take 1 mg by mouth two (2) times a day.  divalproex DR (DEPAKOTE) 250 mg tablet Take 250 mg by mouth two (2) times a day.  prazosin (MINIPRESS) 1 mg capsule Take 1 mg by mouth two (2) times a day.  SUMAtriptan (IMITREX) 20 mg/actuation nasal spray 1 spray each nostril, Repeat after 2 hours prn, Maximum: 40 mg/day. 1 Container 2    cloZAPine (CLOZARIL) 100 mg tablet Take 200 mg by mouth nightly. 2 tabs @ hs      lithium carbonate 300 mg tablet Take 1 Tab by mouth daily. Indications: mood stabilization (Patient taking differently: Take 300 mg by mouth two (2) times a day. take one twice a day  Indications: mood stabilization) 7 Tab 0    propranolol LA (INDERAL LA) 80 mg SR capsule Take 1 Cap by mouth nightly. 30 Cap 5    clonazePAM (KLONOPIN) 0.5 mg tablet Take 0.5 mg by mouth three (3) times daily. No current facility-administered medications on file prior to visit. Imaging:    CT Results (recent):    Results from Hospital Encounter encounter on 06/29/17   CT HEAD WO CONT   Narrative EXAM:  CT HEAD WO CONT    INDICATION:   Headache, chronic, new features or neuro deficit    COMPARISON: 2010. TECHNIQUE: Unenhanced CT of the head was performed using 5 mm images. Brain and  bone windows were generated. CT dose reduction was achieved through use of a  standardized protocol tailored for this examination and automatic exposure  control for dose modulation. Adaptive statistical iterative reconstruction  (ASIR) was utilized. FINDINGS:  There is no extra-axial fluid collection hemorrhage shift or masses her. Ventricles are normal in size. Impression impression: Negative. MRI Results (recent):   No results found for this or any previous visit. IR Results (recent):  No results found for this or any previous visit. VAS/US Results (recent):  No results found for this or any previous visit. Reviewed records in [employer] and [employer] tab today    Lab Review     No visits with results within 3 Month(s) from this visit. Latest known visit with results is:    Office Visit on 06/24/2016   Component Date Value Ref Range Status    Hemoglobin A1c 06/24/2016 5.1  4.8 - 5.6 % Final    Comment:          Pre-diabetes: 5.7 - 6.4           Diabetes: >6.4           Glycemic control for adults with diabetes: <7.0      Estimated average glucose 06/24/2016 100  mg/dL Final    Cholesterol, total 06/24/2016 166  100 - 199 mg/dL Final    Triglyceride 06/24/2016 177* 0 - 149 mg/dL Final    HDL Cholesterol 06/24/2016 37* >39 mg/dL Final    Comment: According to ATP-III Guidelines, HDL-C >59 mg/dL is considered a  negative risk factor for CHD.  VLDL, calculated 06/24/2016 35  5 - 40 mg/dL Final    LDL, calculated 06/24/2016 94  0 - 99 mg/dL Final    INTERPRETATION 06/24/2016 Note   Final    Supplement report is available. Objective:       Exam:  Visit Vitals    /68    Pulse 92    Wt 92.1 kg (203 lb)    SpO2 98%    BMI 32.77 kg/m2     Gen: Awake, alert, follows commands  Appropriate appearance, normal speech. Oriented to all spheres. No visual field defect on confrontation exam.  Full eyes movement, with no nystagmus, no diplopia, no ptosis. Normal gag and swallow. All remaining cranial nerves were normal  Motor function: 5/5 in all extremities  Sensory: intact to LT, PP and JPS  Good FTN and HTS   Gait: Normal    Assessment:     1. Migraine without status migrainosus, not intractable, unspecified migraine type    Diarrhea and SOB due to medication reaction (Propranolol        Plan:   1. Scheduled for Neuropsychological testing to look for dementia  2. Trial of Topamax 25 mg QHS as migraine prophylaxis  3. Continue Imitrex 100 m prn to try to abort headaches  4. Continue headache diary and went through the list that can trigger headache  5. Advise to keep mentally active, exercise and healthy lifestyle  6. Encouraged to talk to her PCP whether some of her psych meds can be adjusted     Follow-up Disposition:  Return in about 1 month (around 8/27/2017).           Caron Gross MD  Diplomate, American Board of Psychiatry and Neurology  Diplomate, Neuromuscular Medicine  Diplomate, American Board of Electrodiagnostic Medicine
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
## 2017-08-04 ENCOUNTER — TELEPHONE (OUTPATIENT)
Dept: NEUROLOGY | Age: 47
End: 2017-08-04

## 2017-08-04 NOTE — TELEPHONE ENCOUNTER
TC- patient stated Allison Protocol will not work for her because her body do not respond well to prednisone. Nurse verbalized understanding.

## 2017-08-04 NOTE — TELEPHONE ENCOUNTER
Pt did not take med (jesusita protocol) because pt's body does not response good to steroid.  Thank you

## 2017-08-04 NOTE — TELEPHONE ENCOUNTER
----- Message from Zenobia Irving sent at 8/4/2017 11:16 AM EDT -----  Regarding: Dr. Perico William was returning phone call she received on 8/3 or early today.  (u)[phone]

## 2017-08-21 ENCOUNTER — TELEPHONE (OUTPATIENT)
Dept: NEUROLOGY | Age: 47
End: 2017-08-21

## 2017-08-21 NOTE — TELEPHONE ENCOUNTER
Pt would like some nausea medicine sent in to her pharmacy [facility]. Her migraines are causing nausea.

## 2017-08-22 RX ORDER — PROCHLORPERAZINE MALEATE 10 MG
10 TABLET ORAL
Qty: 30 TAB | Refills: 1 | Status: SHIPPED | OUTPATIENT
Start: 2017-08-22 | End: 2017-10-18 | Stop reason: ALTCHOICE

## 2017-08-22 NOTE — TELEPHONE ENCOUNTER
Per Dr. Kee English: Daily Pikekelsey try prochlorperazine maleate 10 mg prn for nausea/vomiting. Give 30 pills with 1 refill   Advise for drowsiness. If so can cut tab in half\"    Order placed for prochlorperazine maleate 10 mg prn for nausea/vomiting per Verbal Order from Dr. Kee English on 8/22/2017. Contacted patient and made her aware. She voiced understanding and will call back if any further questions or concerns.

## 2017-09-01 RX ORDER — PROMETHAZINE HYDROCHLORIDE 25 MG/1
TABLET ORAL
Qty: 30 TAB | Refills: 1 | Status: SHIPPED | OUTPATIENT
Start: 2017-09-01 | End: 2017-10-17 | Stop reason: SDUPTHER

## 2017-09-01 NOTE — TELEPHONE ENCOUNTER
Order placed for Phenergan 25 mg #30 tab, #1 refill  PO, per Verbal Order from Dr. Kiah Joshua on 9/1/2017 due to nausea.

## 2017-09-08 ENCOUNTER — OFFICE VISIT (OUTPATIENT)
Dept: NEUROLOGY | Age: 47
End: 2017-09-08

## 2017-09-08 VITALS
DIASTOLIC BLOOD PRESSURE: 78 MMHG | OXYGEN SATURATION: 98 % | HEART RATE: 87 BPM | BODY MASS INDEX: 34.54 KG/M2 | WEIGHT: 214 LBS | SYSTOLIC BLOOD PRESSURE: 114 MMHG

## 2017-09-08 DIAGNOSIS — G43.909 MIGRAINE WITHOUT STATUS MIGRAINOSUS, NOT INTRACTABLE, UNSPECIFIED MIGRAINE TYPE: ICD-10-CM

## 2017-09-08 RX ORDER — TOPIRAMATE 50 MG/1
50 TABLET, FILM COATED ORAL
Qty: 30 TAB | Refills: 5 | Status: SHIPPED | OUTPATIENT
Start: 2017-09-08 | End: 2018-03-12

## 2017-09-08 RX ORDER — TOPIRAMATE 25 MG/1
50 TABLET ORAL
Qty: 30 TAB | Refills: 5 | Status: SHIPPED | OUTPATIENT
Start: 2017-09-08 | End: 2017-09-08 | Stop reason: CLARIF
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
## 2017-09-08 NOTE — MR AVS SNAPSHOT
Visit Information Date & Time Provider Department Dept. Phone Encounter #  
 9/8/2017  9:40 AM MD Jose Carlos Flowers Neurology [facility] [phone] [id number] Follow-up Instructions Return in about 2 months (around 11/8/2017). Upcoming Health Maintenance Date Due INFLUENZA AGE 9 TO ADULT 8/1/2017 Pneumococcal 19-64 Highest Risk (3 of 3 - PCV13) 6/23/2018 PAP AKA CERVICAL CYTOLOGY 6/23/2020 DTaP/Tdap/Td series (2 - Td) 6/23/2027 Allergies as of 9/8/2017  Review Complete On: 9/8/2017 By: Vinay Valle LPN Severity Noted Reaction Type Reactions Penicillin G High 10/01/2012   Systemic Anaphylaxis Aspirin  10/01/2012   Side Effect Nausea and Vomiting Pt states she isn't allergic to ASA she just can't take  due to gastric ulcers. Current Immunizations  Reviewed on 3/17/2015 Name Date Influenza Vaccine PF 3/16/2015  5:12 AM  
 Pneumococcal Polysaccharide (PPSV-23) 3/16/2015  5:15 AM  
  
 Not reviewed this visit You Were Diagnosed With   
  
 Codes Comments Migraine without status migrainosus, not intractable, unspecified migraine type     ICD-10-CM: G43.909 ICD-9-CM: 346.90 Vitals BP Pulse Weight(growth percentile) SpO2 BMI OB Status 114/78 87 214 lb (97.1 kg) 98% 34.54 kg/m2 Having regular periods Smoking Status Former Smoker Vitals History BMI and BSA Data Body Mass Index Body Surface Area 34.54 kg/m 2 2.13 m 2 Preferred Pharmacy Pharmacy Name Phone [facility] [id number] [phone] [facility], [address] [place] [address] [phone] Your Updated Medication List  
  
   
This list is accurate as of: 9/8/17 10:13 AM.  Always use your most recent med list.  
  
  
  
  
 benztropine 1 mg tablet Commonly known as:  COGENTIN Take 1 mg by mouth two (2) times a day. buPROPion  mg SR tablet Commonly known as:  Mariama Velazquez  
 Take 150 mg by mouth every morning. clonazePAM 0.5 mg tablet Commonly known as:  Mable Jarrett Take 0.5 mg by mouth three (3) times daily. cloZAPine 100 mg tablet Commonly known as:  CLOZARIL Take 200 mg by mouth nightly. 2 tabs @ hs  
  
 divalproex  mg tablet Commonly known as:  DEPAKOTE Take 250 mg by mouth two (2) times a day. ibuprofen 600 mg tablet Commonly known as:  MOTRIN Take 1 Tab by mouth every eight (8) hours as needed for Pain.  
  
 lithium carbonate 300 mg tablet Take 1 Tab by mouth daily. Indications: mood stabilization * prazosin 1 mg capsule Commonly known as:  MINIPRESS Take 1 mg by mouth two (2) times a day. * prazosin 2 mg capsule Commonly known as:  MINIPRESS Take 2 mg by mouth nightly. prochlorperazine 10 mg tablet Commonly known as:  COMPAZINE Take 1 Tab by mouth every six (6) hours as needed. promethazine 25 mg tablet Commonly known as:  PHENERGAN Take 1/2 tab to 1 tab every 6 hours prn  
  
 propranolol LA 80 mg SR capsule Commonly known as:  INDERAL LA Take 1 Cap by mouth nightly. SUMAtriptan 100 mg tablet Commonly known as:  IMITREX Take 1 tab at onset of headache;  May repeat 1 tablet after 2 hours. MAX 2 tabs a day SUMAtriptan 20 mg/actuation nasal spray Commonly known as:  IMITREX  
1 spray each nostril, Repeat after 2 hours prn, Maximum: 40 mg/day. topiramate 50 mg tablet Commonly known as:  TOPAMAX Take 1 Tab by mouth nightly. ziprasidone 40 mg capsule Commonly known as:  Karolina Vipul Take 40 mg by mouth two (2) times daily (with meals). * Notice: This list has 2 medication(s) that are the same as other medications prescribed for you. Read the directions carefully, and ask your doctor or other care provider to review them with you. Prescriptions Sent to Pharmacy Refills  
 topiramate (TOPAMAX) 50 mg tablet 5 Sig: Take 1 Tab by mouth nightly. Class: Normal  
 Pharmacy: [facility] [id number] [place] [address], [address]  #: [phone] Route: Oral  
  
Follow-up Instructions Return in about 2 months (around 11/8/2017). Patient Instructions PRESCRIPTION REFILL POLICY Epi [facility] Statement to Patients April 1, 2014 In an effort to ensure the large volume of patient prescription refills is processed in the most efficient and expeditious manner, we are asking our patients to assist us by calling your Pharmacy for all prescription refills, this will include also your  Mail Order Pharmacy. The pharmacy will contact our office electronically to continue the refill process. Please do not wait until the last minute to call your pharmacy. We need at least 48 hours (2days) to fill prescriptions. We also encourage you to call your pharmacy before going to  your prescription to make sure it is ready. With regard to controlled substance prescription refill requests (narcotic refills) that need to be picked up at our office, we ask your cooperation by providing us with at least 72 hours (3days) notice that you will need a refill. We will not refill narcotic prescription refill requests after 4:00pm on any weekday, Monday through Thursday, or after 2:00pm on Fridays, or on the weekends. We encourage everyone to explore another way of getting your prescription refill request processed using [employer], our patient web portal through our electronic medical record system. [employer] is an efficient and effective way to communicate your medication request directly to the office and  downloadable as an sam on your smart phone . [employer] also features a review functionality that allows you to view your medication list as well as leave messages for your physician. Are you ready to get connected? If so please review the attatched instructions or speak to any of our staff to get you set up right away! Thank you so much for your cooperation. Should you have any questions please contact our Practice Administrator. The Physicians and Staff,  [facility] Introducing [facility] SERVICES! Dear Radha Arizmendi: Thank you for requesting a [employer] account. Our records indicate that you already have an active [employer] account. You can access your account anytime at [email or web address] Did you know that you can access your hospital and ER discharge instructions at any time in [employer]? You can also review all of your test results from your hospital stay or ER visit. Additional Information If you have questions, please visit the Frequently Asked Questions section of the [employer] website at [email or web address] Remember, [employer] is NOT to be used for urgent needs. For medical emergencies, dial 911. Now available from your iPhone and Android! Please provide this summary of care documentation to your next provider. Your primary care clinician is listed as Abbie Zhang. If you have any questions after today's visit, please call [phone].

## 2017-09-08 NOTE — PATIENT INSTRUCTIONS
[facility]   Statement to Patients  April 1, 2014      In an effort to ensure the large volume of patient prescription refills is processed in the most efficient and expeditious manner, we are asking our patients to assist us by calling your Pharmacy for all prescription refills, this will include also your  Mail Order Pharmacy. The pharmacy will contact our office electronically to continue the refill process. Please do not wait until the last minute to call your pharmacy. We need at least 48 hours (2days) to fill prescriptions. We also encourage you to call your pharmacy before going to  your prescription to make sure it is ready. With regard to controlled substance prescription refill requests (narcotic refills) that need to be picked up at our office, we ask your cooperation by providing us with at least 72 hours (3days) notice that you will need a refill. We will not refill narcotic prescription refill requests after 4:00pm on any weekday, Monday through Thursday, or after 2:00pm on Fridays, or on the weekends. We encourage everyone to explore another way of getting your prescription refill request processed using [employer], our patient web portal through our electronic medical record system. [employer] is an efficient and effective way to communicate your medication request directly to the office and  downloadable as an sam on your smart phone . [employer] also features a review functionality that allows you to view your medication list as well as leave messages for your physician. Are you ready to get connected? If so please review the attatched instructions or speak to any of our staff to get you set up right away! Thank you so much for your cooperation. Should you have any questions please contact our Practice Administrator.     The Physicians and Staff,  [facility]

## 2017-10-15 DIAGNOSIS — G43.909 MIGRAINE WITHOUT STATUS MIGRAINOSUS, NOT INTRACTABLE, UNSPECIFIED MIGRAINE TYPE: ICD-10-CM

## 2017-10-16 RX ORDER — SUMATRIPTAN 100 MG/1
TABLET, FILM COATED ORAL
Qty: 9 TAB | Refills: 2 | Status: SHIPPED | OUTPATIENT
Start: 2017-10-16 | End: 2018-02-27 | Stop reason: SDUPTHER

## 2017-10-18 RX ORDER — PROMETHAZINE HYDROCHLORIDE 25 MG/1
TABLET ORAL
Qty: 30 TAB | Refills: 1 | Status: SHIPPED | OUTPATIENT
Start: 2017-10-18 | End: 2017-12-11 | Stop reason: SDUPTHER

## 2017-11-27 ENCOUNTER — OFFICE VISIT (OUTPATIENT)
Dept: FAMILY MEDICINE CLINIC | Age: 47
End: 2017-11-27

## 2017-11-27 VITALS
HEIGHT: 66 IN | SYSTOLIC BLOOD PRESSURE: 124 MMHG | DIASTOLIC BLOOD PRESSURE: 64 MMHG | BODY MASS INDEX: 34.36 KG/M2 | WEIGHT: 213.8 LBS | RESPIRATION RATE: 18 BRPM | TEMPERATURE: 97.1 F | HEART RATE: 85 BPM | OXYGEN SATURATION: 97 %

## 2017-11-27 DIAGNOSIS — G47.9 SLEEP DISORDER: Primary | ICD-10-CM

## 2017-11-27 RX ORDER — DIAZEPAM 10 MG/1
10 TABLET ORAL 2 TIMES DAILY
COMMUNITY
Start: 2017-11-15 | End: 2018-08-28 | Stop reason: ALTCHOICE

## 2017-11-27 RX ORDER — PRAZOSIN HYDROCHLORIDE 5 MG/1
2 CAPSULE ORAL
COMMUNITY
End: 2018-08-28 | Stop reason: DRUGHIGH

## 2017-11-27 RX ORDER — CHOLECALCIFEROL (VITAMIN D3) 125 MCG
5 CAPSULE ORAL
Qty: 30 TAB | Refills: 2 | COMMUNITY
Start: 2017-11-27 | End: 2018-01-06

## 2017-11-27 NOTE — PROGRESS NOTES
Chief Complaint   Patient presents with    Sleep Problem     Unable to fall sleep and stay asleep fro two weeks. 1. Have you been to the ER, urgent care clinic since your last visit? Hospitalized since your last visit? No    2. Have you seen or consulted any other health care providers outside of the [address] [place] [address] since your last visit? Include any pap smears or colon screening. [address] Dentistry 11/2017 Pt had all of her teeth pulled and now has dentures.

## 2017-11-27 NOTE — PROGRESS NOTES
The patient was seen today with NP student. I have reviewed this note and agree with accuracy of information and plan of management. RACHEL is a 50yo female presenting today with \"trouble sleeping\"    HPI:  Pt reports that she has been having trouble with sleeping for the past 2 weeks. Her biggest issue is that she wakes up around 3:30/4am in the morning which is disrupting her sleep cycle and causing her to be drowsy and tired during the day. She reports that she goes to bed between 8-11 in the evening after she bathes her mother and gives her mother all her medications. States she normally does not have trouble falling asleep and will spend some time on her phone or watching tv before sleeping. Reports that she spoke to Dr. Juana Ballard (psychiatrist) about her sleep issues and he changed her Prazosin to 5mg at night about a week ago to help with sleep issues. Pt states that it has not helped. Denies taking naps, getting physical activity almost every day when she walks her dog for an hour, denies caffeine, alcohol, smoking, OTC supplements and medications. Reports that she has stress from her dad passing away in February and her being her mother's only caregiver. Pt reports that she is going to see Dr. Sunil Faria tomorrow (11/28/17) for her ongoing headaches/migraines that have gotten worse since her last visit in July 2017.      Past Medical History:   Diagnosis Date    Back pain     Borderline personality disorder     Dysthymic disorder     Headache     Insomnia, unspecified     Post traumatic stress disorder (PTSD)     Psychosis     PUD (peptic ulcer disease)     Schizoaffective disorder (HCC)      Past Surgical History:   Procedure Laterality Date    HX CHOLECYSTECTOMY      2012    HX GI  1971    Ulcer surgery of stomach     Family History   Problem Relation Age of Onset    Hypertension Father     Psychiatric Disorder Father      ETOH    Arthritis-osteo Mother     Thyroid Disease Mother     Stroke Maternal Grandmother     Malignant Hyperthermia Neg Hx     Pseudocholinesterase Deficiency Neg Hx     Delayed Awakening Neg Hx     Post-op Nausea/Vomiting Neg Hx     Emergence Delirium Neg Hx     Post-op Cognitive Dysfunction Neg Hx     Other Neg Hx      Social History     Social History    Marital status: SINGLE     Spouse name: N/A    Number of children: N/A    Years of education: N/A     Occupational History    Not on file. 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 MAY REPEAT ONE TABLET AFTER 2 HOURS *MAXIMUM OF TWO TABLETS A DAY * 9 Tab 2    topiramate (TOPAMAX) 50 mg tablet Take 1 Tab by mouth nightly. 30 Tab 5    buPROPion SR (WELLBUTRIN SR) 150 mg SR tablet Take 150 mg by mouth every morning.  ibuprofen (MOTRIN) 600 mg tablet Take 1 Tab by mouth every eight (8) hours as needed for Pain. (Patient taking differently: Take 800 mg by mouth every eight (8) hours as needed for Pain.) 30 Tab 0    ziprasidone (GEODON) 40 mg capsule Take 40 mg by mouth two (2) times daily (with meals).  benztropine (COGENTIN) 1 mg tablet Take 1 mg by mouth two (2) times a day.  divalproex DR (DEPAKOTE) 250 mg tablet Take 250 mg by mouth two (2) times a day.  cloZAPine (CLOZARIL) 100 mg tablet Take 200 mg by mouth nightly. 2 tabs @ hs      lithium carbonate 300 mg tablet Take 1 Tab by mouth daily.  Indications: mood stabilization (Patient taking differently: Take 300 mg by mouth two (2) times a day. take one twice a day  Indications: mood stabilization) 7 Tab 0     No current facility-administered medications on file prior to visit. Allergies   Allergen Reactions    Penicillin G Anaphylaxis    Aspirin Nausea and Vomiting     Pt states she isn't allergic to ASA she just can't take  due to gastric ulcers. ROS:  Constitutional: denies fever, chills, +fatigue  CV: denies chest pain, palpitations, dizziness, +headaches  Resp: denies SOB, BROWN, cough,   GI/: denies abd pain, n/v, diarrhea, constipation, blood in stool, blood in urine    Objective:     Visit Vitals    /64 (BP 1 Location: Right arm, BP Patient Position: Sitting)    Pulse 85    Temp 97.1 °F (36.2 °C) (Oral)    Resp 18    Ht 5' 6\" (1.676 m)    Wt 213 lb 12.8 oz (97 kg)    SpO2 97%    BMI 34.51 kg/m2     General: alert, oriented, appears well-developed, well-nourished, showing no signs of distress  CVS exam: normal rate, regular rhythm, normal S1, S2, no murmurs, rubs, clicks or gallops  Resp: CTAB    Assessment/Plan:      ICD-10-CM ICD-9-CM    1. Sleep disorder G47.9 780.50 melatonin tab tablet     . Try Melatonin 3-5 mg an hour before bedtime. She has FU with psychiatry Dr. Rin Meza and Neurology also. Discuss sleep hygiene. Patient Instructions          Insomnia: Care Instructions  Your Care Instructions    Insomnia is the inability to sleep well. It is a common problem for most people at some time. Insomnia may make it hard for you to get to sleep, stay asleep, or sleep as long as you need to. This can make you tired and grouchy during the day. It can also make you forgetful, less effective at work, and unhappy. Insomnia can be caused by conditions such as depression or anxiety. Pain can also affect your ability to sleep. When these problems are solved, the insomnia usually clears up.  But sometimes bad sleep habits can cause insomnia. If insomnia is affecting your work or your enjoyment of life, you can take steps to improve your sleep. Follow-up care is a key part of your treatment and safety. Be sure to make and go to all appointments, and call your doctor if you are having problems. It's also a good idea to know your test results and keep a list of the medicines you take. How can you care for yourself at home? What to avoid  · Do not have drinks with caffeine, such as coffee or black tea, for 8 hours before bed. · Do not smoke or use other types of tobacco near bedtime. Nicotine is a stimulant and can keep you awake. · Avoid drinking alcohol late in the evening, because it can cause you to wake in the middle of the night. · Do not eat a big meal close to bedtime. If you are hungry, eat a light snack. · Do not drink a lot of water close to bedtime, because the need to urinate may wake you up during the night. · Do not read or watch TV in bed. Use the bed only for sleeping and sexual activity. What to try  · Go to bed at the same time every night, and wake up at the same time every morning. Do not take naps during the day. · Keep your bedroom quiet, dark, and cool. · Sleep on a comfortable pillow and mattress. · If watching the clock makes you anxious, turn it facing away from you so you cannot see the time. · If you worry when you lie down, start a worry book. Well before bedtime, write down your worries, and then set the book and your concerns aside. · Try meditation or other relaxation techniques before you go to bed. · If you cannot fall asleep, get up and go to another room until you feel sleepy. Do something relaxing. Repeat your bedtime routine before you go to bed again. · Make your house quiet and calm about an hour before bedtime. Turn down the lights, turn off the TV, log off the computer, and turn down the volume on music. This can help you relax after a busy day. When should you call for help?   Watch closely for changes in your health, and be sure to contact your doctor if:  ? · Your efforts to improve your sleep do not work. ? · Your insomnia gets worse. ? · You have been feeling down, depressed, or hopeless or have lost interest in things that you usually enjoy. Where can you learn more? Go to http://daisy-millicent.info/. Enter P513 in the search box to learn more about \"Insomnia: Care Instructions. \"  Current as of: July 26, 2016  Content Version: 11.4  © 3352-1231 [employer]. Care instructions adapted under license by [employer] (which disclaims liability or warranty for this information). If you have questions about a medical condition or this instruction, always ask your healthcare professional. [address] any warranty or liability for your use of this information.

## 2017-11-27 NOTE — PROGRESS NOTES
RACHEL is a 48yo female presenting today with \"trouble sleeping\"    HPI:  Pt reports that she has been having trouble with sleeping for the past 2 weeks. Her biggest issue is that she wakes up around 3:30/4am in the morning which is disrupting her sleep cycle and causing her to be drowsy and tired during the day. She reports that she goes to bed between 8-11 in the evening after she bathes her mother and gives her mother all her medications. States she normally does not have trouble falling asleep and will spend some time on her phone or watching tv before sleeping. Reports that she spoke to Dr. Rin Meza (psychiatrist) about her sleep issues and he changed her Prazosin to 5mg at night about a week ago to help with sleep issues. Pt states that it has not helped. Denies taking naps, getting physical activity almost every day when she walks her dog for an hour, denies caffeine, alcohol, smoking, OTC supplements and medications. Reports that she has stress from her dad passing away in February and her being her mother's only caregiver. Pt reports that she is going to see Dr. Drake Kumar tomorrow (11/28/17) for her ongoing headaches/migraines that have gotten worse since her last visit in July 2017.      Past Medical History:   Diagnosis Date    Back pain     Borderline personality disorder     Dysthymic disorder     Headache     Insomnia, unspecified     Post traumatic stress disorder (PTSD)     Psychosis     PUD (peptic ulcer disease)     Schizoaffective disorder (HCC)      Past Surgical History:   Procedure Laterality Date    HX CHOLECYSTECTOMY      2012    HX GI  1971    Ulcer surgery of stomach     Family History   Problem Relation Age of Onset    Hypertension Father     Psychiatric Disorder Father      ETOH    Arthritis-osteo Mother     Thyroid Disease Mother     Stroke Maternal Grandmother     Malignant Hyperthermia Neg Hx     Pseudocholinesterase Deficiency Neg Hx     Delayed Awakening Neg Hx     Post-op Nausea/Vomiting Neg Hx     Emergence Delirium Neg Hx     Post-op Cognitive Dysfunction Neg Hx     Other Neg Hx      Social History     Social History    Marital status: SINGLE     Spouse name: N/A    Number of children: N/A    Years of education: N/A     Occupational History    Not on file. 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 MAY REPEAT ONE TABLET AFTER 2 HOURS *MAXIMUM OF TWO TABLETS A DAY * 9 Tab 2    topiramate (TOPAMAX) 50 mg tablet Take 1 Tab by mouth nightly. 30 Tab 5    buPROPion SR (WELLBUTRIN SR) 150 mg SR tablet Take 150 mg by mouth every morning.  ibuprofen (MOTRIN) 600 mg tablet Take 1 Tab by mouth every eight (8) hours as needed for Pain. (Patient taking differently: Take 800 mg by mouth every eight (8) hours as needed for Pain.) 30 Tab 0    ziprasidone (GEODON) 40 mg capsule Take 40 mg by mouth two (2) times daily (with meals).  benztropine (COGENTIN) 1 mg tablet Take 1 mg by mouth two (2) times a day.  divalproex DR (DEPAKOTE) 250 mg tablet Take 250 mg by mouth two (2) times a day.  cloZAPine (CLOZARIL) 100 mg tablet Take 200 mg by mouth nightly. 2 tabs @ hs      lithium carbonate 300 mg tablet Take 1 Tab by mouth daily. Indications: mood stabilization (Patient taking differently: Take 300 mg by mouth two (2) times a day.  take one twice a day  Indications: mood stabilization) 7 Tab 0     No current facility-administered medications on file prior to visit. Allergies   Allergen Reactions    Penicillin G Anaphylaxis    Aspirin Nausea and Vomiting     Pt states she isn't allergic to ASA she just can't take  due to gastric ulcers. ROS:  Constitutional: denies fever, chills, +fatigue  CV: denies chest pain, palpitations, dizziness, +headaches  Resp: denies SOB, BROWN, cough,   GI/: denies abd pain, n/v, diarrhea, constipation, blood in stool, blood in urine    Objective:     Visit Vitals    /64 (BP 1 Location: Right arm, BP Patient Position: Sitting)    Pulse 85    Temp 97.1 °F (36.2 °C) (Oral)    Resp 18    Ht 5' 6\" (1.676 m)    Wt 213 lb 12.8 oz (97 kg)    SpO2 97%    BMI 34.51 kg/m2     General: alert, oriented, appears well-developed, well-nourished, showing no signs of distress  CVS exam: normal rate, regular rhythm, normal S1, S2, no murmurs, rubs, clicks or gallops  Resp: CTAB    Assessment/Plan:      ICD-10-CM ICD-9-CM    1. Sleep disorder G47.9 780.50 melatonin tab tablet     .

## 2017-11-27 NOTE — PATIENT INSTRUCTIONS

## 2017-11-27 NOTE — MR AVS SNAPSHOT
Visit Information Date & Time Provider Department Dept. Phone Encounter #  
 07/87/3291  6:92 PM Sarah CarusoYoung [phone] [id number] Your Appointments 11/28/2017  8:40 AM  
Follow Up with Luis Nguyen MD  
[facility]) Appt Note: More frequent migraines/mes 11.24.17  
 [place] [address] [facility] [address] [facility] [address] [id number] [phone]  
  
   
 [place] [address] [id number] [address] Upcoming Health Maintenance Date Due Influenza Age 5 to Adult 8/1/2017 Pneumococcal 19-64 Highest Risk (3 of 3 - PCV13) 6/23/2018 PAP AKA CERVICAL CYTOLOGY 6/23/2020 DTaP/Tdap/Td series (2 - Td) 6/23/2027 Allergies as of 11/27/2017  Review Complete On: 83/10/0926 By: Sarah Caruso MD  
  
 Severity Noted Reaction Type Reactions Penicillin G High 10/01/2012   Systemic Anaphylaxis Aspirin  10/01/2012   Side Effect Nausea and Vomiting Pt states she isn't allergic to ASA she just can't take  due to gastric ulcers. Current Immunizations  Reviewed on 3/17/2015 Name Date Influenza Vaccine PF 3/16/2015  5:12 AM  
 Pneumococcal Polysaccharide (PPSV-23) 3/16/2015  5:15 AM  
  
 Not reviewed this visit You Were Diagnosed With   
  
 Codes Comments Sleep disorder    -  Primary ICD-10-CM: G47.9 ICD-9-CM: 780.50 Vitals BP Pulse Temp Resp Height(growth percentile) Weight(growth percentile) 124/64 (BP 1 Location: Right arm, BP Patient Position: Sitting) 85 97.1 °F (36.2 °C) (Oral) 18 5' 6\" (1.676 m) 213 lb 12.8 oz (97 kg) LMP SpO2 BMI OB Status Smoking Status 11/06/2017 (Exact Date) 97% 34.51 kg/m2 Having regular periods Former Smoker Vitals History BMI and BSA Data Body Mass Index Body Surface Area 34.51 kg/m 2 2.13 m 2 Preferred Pharmacy Pharmacy Name Phone [facility] [address] [facility] [address] Your Updated Medication List  
  
   
This list is accurate as of: 11/27/17  3:57 PM.  Always use your most recent med list.  
  
  
  
  
 benztropine 1 mg tablet Commonly known as:  COGENTIN Take 1 mg by mouth two (2) times a day. buPROPion  mg SR tablet Commonly known as:  Kaleta Krill Take 150 mg by mouth every morning. cloZAPine 100 mg tablet Commonly known as:  CLOZARIL Take 200 mg by mouth nightly. 2 tabs @ hs  
  
 diazePAM 10 mg tablet Commonly known as:  VALIUM  
10 mg two (2) times a day. Indications: anxiety  
  
 divalproex  mg tablet Commonly known as:  DEPAKOTE Take 250 mg by mouth two (2) times a day. FLUZONE QUAD 7091-7190 (PF) Syrg injection Generic drug:  influenza vaccine 2017-18 (3 yrs+)(PF)  
  
 ibuprofen 600 mg tablet Commonly known as:  MOTRIN Take 1 Tab by mouth every eight (8) hours as needed for Pain.  
  
 lithium carbonate 300 mg tablet Take 1 Tab by mouth daily. Indications: mood stabilization  
  
 melatonin Tab tablet Take 1 Tab by mouth nightly. prazosin 5 mg capsule Commonly known as:  MINIPRESS Take  by mouth nightly. promethazine 25 mg tablet Commonly known as:  PHENERGAN  
TAKE ONE-HALF TO ONE TABLET BY MOUTH EVERY 6 HOURS AS NEEDED  
  
 SUMAtriptan 100 mg tablet Commonly known as:  IMITREX  
TAKE ONE TABLET BY MOUTH AT ONSET OF HEADACHE. MAY REPEAT ONE TABLET AFTER 2 HOURS *MAXIMUM OF TWO TABLETS A DAY *  
  
 topiramate 50 mg tablet Commonly known as:  TOPAMAX Take 1 Tab by mouth nightly. ziprasidone 40 mg capsule Commonly known as:  Lizbeth Lords Take 40 mg by mouth two (2) times daily (with meals). Introducing [facility] & HEALTH SERVICES! Dear Bernadine Presley: Thank you for requesting a [employer] account. Our records indicate that you already have an active [employer] account.   You can access your account anytime at [email or web address] Did you know that you can access your hospital and ER discharge instructions at any time in [facility]? You can also review all of your test results from your hospital stay or ER visit. Additional Information If you have questions, please visit the Frequently Asked Questions section of the [facility] website at [email or web address] Remember, [facility] is NOT to be used for urgent needs. For medical emergencies, dial 911. Now available from your iPhone and Android! Please provide this summary of care documentation to your next provider. Your primary care clinician is listed as Charlotte Lovell. If you have any questions after today's visit, please call [phone].

## 2017-11-28 ENCOUNTER — OFFICE VISIT (OUTPATIENT)
Dept: NEUROLOGY | Age: 47
End: 2017-11-28

## 2017-11-28 VITALS
SYSTOLIC BLOOD PRESSURE: 112 MMHG | HEART RATE: 96 BPM | BODY MASS INDEX: 34.38 KG/M2 | WEIGHT: 213 LBS | DIASTOLIC BLOOD PRESSURE: 72 MMHG

## 2017-11-28 DIAGNOSIS — G43.009 MIGRAINE WITHOUT AURA AND WITHOUT STATUS MIGRAINOSUS, NOT INTRACTABLE: Primary | ICD-10-CM

## 2017-11-28 NOTE — MR AVS SNAPSHOT
Visit Information Date & Time Provider Department Dept. Phone Encounter #  
 11/28/2017  8:40 AM Hans Braswell MD [facility] Neurology [facility] [phone] [id number] Follow-up Instructions Return in about 3 months (around 2/28/2018). Your Appointments 11/28/2017  8:40 AM  
Follow Up with Hans Braswell MD  
[facility] Appt Note: More frequent migraines/mes 11.24.17  
 [place] [address] [facility] [address] [facility] [address] [phone]  
  
   
 [place] [address] [id number] [address] Upcoming Health Maintenance Date Due Influenza Age 5 to Adult 8/1/2017 Pneumococcal 19-64 Highest Risk (3 of 3 - PCV13) 6/23/2018 PAP AKA CERVICAL CYTOLOGY 6/23/2020 DTaP/Tdap/Td series (2 - Td) 6/23/2027 Allergies as of 11/28/2017  Review Complete On: 11/28/2017 By: Palomo Roberts LPN Severity Noted Reaction Type Reactions Penicillin G High 10/01/2012   Systemic Anaphylaxis Aspirin  10/01/2012   Side Effect Nausea and Vomiting Pt states she isn't allergic to ASA she just can't take  due to gastric ulcers. Current Immunizations  Reviewed on 3/17/2015 Name Date Influenza Vaccine PF 3/16/2015  5:12 AM  
 Pneumococcal Polysaccharide (PPSV-23) 3/16/2015  5:15 AM  
  
 Not reviewed this visit You Were Diagnosed With   
  
 Codes Comments Migraine without aura and without status migrainosus, not intractable    -  Primary ICD-10-CM: G43.009 ICD-9-CM: 346.10 Vitals BP Pulse Weight(growth percentile) LMP PF BMI  
 112/72 96 213 lb (96.6 kg) 11/06/2017 (Exact Date) 98 L/min 34.38 kg/m2 OB Status Smoking Status Having regular periods Former Smoker BMI and BSA Data Body Mass Index Body Surface Area  
 34.38 kg/m 2 2.12 m 2 Preferred Pharmacy Pharmacy Name Phone [facility] [address] [facility] [address] Your Updated Medication List  
  
   
This list is accurate as of: 11/28/17  8:36 AM.  Always use your most recent med list.  
  
  
  
  
 benztropine 1 mg tablet Commonly known as:  COGENTIN Take 1 mg by mouth two (2) times a day. buPROPion  mg SR tablet Commonly known as:  Cowan Crooked Take 150 mg by mouth every morning. cloZAPine 100 mg tablet Commonly known as:  CLOZARIL Take 200 mg by mouth nightly. 2 tabs @ hs  
  
 diazePAM 10 mg tablet Commonly known as:  VALIUM  
10 mg two (2) times a day. Indications: anxiety  
  
 divalproex  mg tablet Commonly known as:  DEPAKOTE Take 250 mg by mouth two (2) times a day. FLUZONE QUAD 0432-1241 (PF) Syrg injection Generic drug:  influenza vaccine 2017-18 (3 yrs+)(PF)  
  
 ibuprofen 600 mg tablet Commonly known as:  MOTRIN Take 1 Tab by mouth every eight (8) hours as needed for Pain.  
  
 lithium carbonate 300 mg tablet Take 1 Tab by mouth daily. Indications: mood stabilization  
  
 melatonin Tab tablet Take 1 Tab by mouth nightly. prazosin 5 mg capsule Commonly known as:  MINIPRESS Take  by mouth nightly. promethazine 25 mg tablet Commonly known as:  PHENERGAN  
TAKE ONE-HALF TO ONE TABLET BY MOUTH EVERY 6 HOURS AS NEEDED  
  
 SUMAtriptan 100 mg tablet Commonly known as:  IMITREX  
TAKE ONE TABLET BY MOUTH AT ONSET OF HEADACHE. MAY REPEAT ONE TABLET AFTER 2 HOURS *MAXIMUM OF TWO TABLETS A DAY *  
  
 topiramate 50 mg tablet Commonly known as:  TOPAMAX Take 1 Tab by mouth nightly. ziprasidone 40 mg capsule Commonly known as:  Fozia Tam Take 40 mg by mouth two (2) times daily (with meals). Follow-up Instructions Return in about 3 months (around 2/28/2018). Patient Instructions PRESCRIPTION REFILL POLICY [facility] [place] [facility] Statement to Patients April 1, 2014 In an effort to ensure the large volume of patient prescription refills is processed in the most efficient and expeditious manner, we are asking our patients to assist us by calling your Pharmacy for all prescription refills, this will include also your  Mail Order Pharmacy. The pharmacy will contact our office electronically to continue the refill process. Please do not wait until the last minute to call your pharmacy. We need at least 48 hours (2days) to fill prescriptions. We also encourage you to call your pharmacy before going to  your prescription to make sure it is ready. With regard to controlled substance prescription refill requests (narcotic refills) that need to be picked up at our office, we ask your cooperation by providing us with at least 72 hours (3days) notice that you will need a refill. We will not refill narcotic prescription refill requests after 4:00pm on any weekday, Monday through Thursday, or after 2:00pm on Fridays, or on the weekends. We encourage everyone to explore another way of getting your prescription refill request processed using ReSnap, our patient web portal through our electronic medical record system. ReSnap is an efficient and effective way to communicate your medication request directly to the office and  downloadable as an sam on your smart phone . ReSnap also features a review functionality that allows you to view your medication list as well as leave messages for your physician. Are you ready to get connected? If so please review the attatched instructions or speak to any of our staff to get you set up right away! Thank you so much for your cooperation. Should you have any questions please contact our Practice Administrator. The Physicians and Staff,  [facility] Migraine Headache: Care Instructions Your Care Instructions Migraines are painful, throbbing headaches that often start on one side of the head. They may cause nausea and vomiting and make you sensitive to light, sound, or smell. Without treatment, migraines can last from 4 hours to a few days. Medicines can help prevent migraines or stop them after they have started. Your doctor can help you find which ones work best for you. Follow-up care is a key part of your treatment and safety. Be sure to make and go to all appointments, and call your doctor if you are having problems. It's also a good idea to know your test results and keep a list of the medicines you take. How can you care for yourself at home? · Do not drive if you have taken a prescription pain medicine. · Rest in a quiet, dark room until your headache is gone. Close your eyes, and try to relax or go to sleep. Don't watch TV or read. · Put a cold, moist cloth or cold pack on the painful area for 10 to 20 minutes at a time. Put a thin cloth between the cold pack and your skin. · Use a warm, moist towel or a heating pad set on low to relax tight shoulder and neck muscles. · Have someone gently massage your neck and shoulders. · Take your medicines exactly as prescribed. Call your doctor if you think you are having a problem with your medicine. You will get more details on the specific medicines your doctor prescribes. · Be careful not to take pain medicine more often than the instructions allow. You could get worse or more frequent headaches when the medicine wears off. To prevent migraines · Keep a headache diary so you can figure out what triggers your headaches. Avoiding triggers may help you prevent headaches. Record when each headache began, how long it lasted, and what the pain was like. (Was it throbbing, aching, stabbing, or dull?) Write down any other symptoms you had with the headache, such as nausea, flashing lights or dark spots, or sensitivity to bright light or loud noise. Note if the headache occurred near your period.  List anything that might have triggered the headache. Triggers may include certain foods (chocolate, cheese, wine) or odors, smoke, bright light, stress, or lack of sleep. · If your doctor has prescribed medicine for your migraines, take it as directed. You may have medicine that you take only when you get a migraine and medicine that you take all the time to help prevent migraines. ¨ If your doctor has prescribed medicine for when you get a headache, take it at the first sign of a migraine, unless your doctor has given you other instructions. ¨ If your doctor has prescribed medicine to prevent migraines, take it exactly as prescribed. Call your doctor if you think you are having a problem with your medicine. · Find healthy ways to deal with stress. Migraines are most common during or right after stressful times. Take time to relax before and after you do something that has caused a migraine in the past. 
· Try to keep your muscles relaxed by keeping good posture. Check your jaw, face, neck, and shoulder muscles for tension. Try to relax them. When you sit at a desk, change positions often. And make sure to stretch for 30 seconds each hour. · Get plenty of sleep and exercise. · Eat meals on a regular schedule. Avoid foods and drinks that often trigger migraines. These include chocolate, alcohol (especially red wine and port), aspartame, monosodium glutamate (MSG), and some additives found in foods (such as hot dogs, parrish, cold cuts, aged cheeses, and pickled foods). · Limit caffeine. Don't drink too much coffee, tea, or soda. But don't quit caffeine suddenly. That can also give you migraines. · Do not smoke or allow others to smoke around you. If you need help quitting, talk to your doctor about stop-smoking programs and medicines. These can increase your chances of quitting for good. · If you are taking birth control pills or hormone therapy, talk to your doctor about whether they are triggering your migraines. When should you call for help? Call 911 anytime you think you may need emergency care. For example, call if: 
? · You have signs of a stroke. These may include: 
¨ Sudden numbness, paralysis, or weakness in your face, arm, or leg, especially on only one side of your body. ¨ Sudden vision changes. ¨ Sudden trouble speaking. ¨ Sudden confusion or trouble understanding simple statements. ¨ Sudden problems with walking or balance. ¨ A sudden, severe headache that is different from past headaches. ?Call your doctor now or seek immediate medical care if: 
? · You have new or worse nausea and vomiting. ? · You have a new or higher fever. ? · Your headache gets much worse. ? Watch closely for changes in your health, and be sure to contact your doctor if: 
? · You are not getting better after 2 days (48 hours). Where can you learn more? Go to http://daisy-millicent.info/. Enter A404 in the search box to learn more about \"Migraine Headache: Care Instructions. \" Current as of: October 14, 2016 Content Version: 11.4 © 5375-6496 atOnePlace.com. Care instructions adapted under license by [employer] (which disclaims liability or warranty for this information). If you have questions about a medical condition or this instruction, always ask your healthcare professional. [address] any warranty or liability for your use of this information. Introducing [facility] & HEALTH SERVICES! Dear Doretha Mckinney: Thank you for requesting a [employer] account. Our records indicate that you already have an active [employer] account. You can access your account anytime at [email or web address] Did you know that you can access your hospital and ER discharge instructions at any time in [employer]? You can also review all of your test results from your hospital stay or ER visit. Additional Information If you have questions, please visit the Frequently Asked Questions section of the [employer] website at [email or web address] Remember, [employer] is NOT to be used for urgent needs. For medical emergencies, dial 911. Now available from your iPhone and Android! Please provide this summary of care documentation to your next provider. Your primary care clinician is listed as Krystal Barker. If you have any questions after today's visit, please call [phone].

## 2017-11-28 NOTE — PROGRESS NOTES
Neurology Progress Note    Patient ID:  Carmen Hernandez  [id number]  30 y.o.  1970      Subjective:   History:  Nicholas Kelly is a 50 y.o. female who  has a past medical history of Back pain; Borderline personality disorder; Dysthymic disorder; Headache; Insomnia, PTSD, PUD (peptic ulcer disease); and Schizoaffective disorder (HCC). who for the past 2-3 months, noted severe headache, R periorbital radiating to R temple and frontal area, 3-4/ week, lasting 4 hrs, stress seem to be a trigger, Imitrex works, has nausea, photophobia and phonophobia. Consideration include migraine, without visual auras, intractable which may be related to perimenopause and stress due to recent passing of father from a pedestrian accident. Patient also noted cognitive issues for 5 months described as jumbling up words concerning for mild cognitive impairment probably due to her psych medication and again stress from her dad's passing. Propranolol caused SOB and diarrhea.     Patient now on Topamax 50 mg QHS with no headaches until the past 5 days during gi. Admits to stress and missing her  father and taking care of her sick mother. Imitrex works but is saving them due to financial constraints. 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  Indications: anxiety      prazosin (MINIPRESS) 5 mg capsule Take  by mouth nightly.  melatonin tab tablet Take 1 Tab by mouth nightly. 30 Tab 2    promethazine (PHENERGAN) 25 mg tablet TAKE ONE-HALF TO ONE TABLET BY MOUTH EVERY 6 HOURS AS NEEDED 30 Tab 1    SUMAtriptan (IMITREX) 100 mg tablet TAKE ONE TABLET BY MOUTH AT ONSET OF HEADACHE. MAY REPEAT ONE TABLET AFTER 2 HOURS *MAXIMUM OF TWO TABLETS A DAY * 9 Tab 2    topiramate (TOPAMAX) 50 mg tablet Take 1 Tab by mouth nightly. 30 Tab 5    buPROPion SR (WELLBUTRIN SR) 150 mg SR tablet Take 150 mg by mouth every morning.  ziprasidone (GEODON) 40 mg capsule Take 40 mg by mouth two (2) times daily (with meals).  benztropine (COGENTIN) 1 mg tablet Take 1 mg by mouth two (2) times a day.  divalproex DR (DEPAKOTE) 250 mg tablet Take 250 mg by mouth two (2) times a day.  lithium carbonate 300 mg tablet Take 1 Tab by mouth daily. Indications: mood stabilization (Patient taking differently: Take 300 mg by mouth two (2) times a day. take one twice a day  Indications: mood stabilization) 7 Tab 0    FLUZONE QUAD 2297-4837, PF, syrg injection       ibuprofen (MOTRIN) 600 mg tablet Take 1 Tab by mouth every eight (8) hours as needed for Pain. (Patient taking differently: Take 800 mg by mouth every eight (8) hours as needed for Pain.) 30 Tab 0    cloZAPine (CLOZARIL) 100 mg tablet Take 200 mg by mouth nightly. 2 tabs @ hs       No current facility-administered medications on file prior to visit. Imaging:    CT Results (recent):    Results from Hospital Encounter encounter on 06/29/17   CT HEAD WO CONT   Narrative EXAM:  CT HEAD WO CONT    INDICATION:   Headache, chronic, new features or neuro deficit    COMPARISON: 2010. TECHNIQUE: Unenhanced CT of the head was performed using 5 mm images. Brain and  bone windows were generated.   CT dose reduction was achieved through use of a  standardized protocol tailored for this examination and automatic exposure  control for dose modulation. Adaptive statistical iterative reconstruction  (ASIR) was utilized. FINDINGS:  There is no extra-axial fluid collection hemorrhage shift or masses her. Ventricles are normal in size. Impression impression: Negative. MRI Results (recent):  No results found for this or any previous visit. IR Results (recent):  No results found for this or any previous visit. VAS/US Results (recent):  No results found for this or any previous visit. Reviewed records in [employer] tab today    Lab Review     No visits with results within 3 Month(s) from this visit. Latest known visit with results is:    Office Visit on 06/24/2016   Component Date Value Ref Range Status    Hemoglobin A1c 06/24/2016 5.1  4.8 - 5.6 % Final    Comment:          Pre-diabetes: 5.7 - 6.4           Diabetes: >6.4           Glycemic control for adults with diabetes: <7.0      Estimated average glucose 06/24/2016 100  mg/dL Final    Cholesterol, total 06/24/2016 166  100 - 199 mg/dL Final    Triglyceride 06/24/2016 177* 0 - 149 mg/dL Final    HDL Cholesterol 06/24/2016 37* >39 mg/dL Final    Comment: According to ATP-III Guidelines, HDL-C >59 mg/dL is considered a  negative risk factor for CHD.  VLDL, calculated 06/24/2016 35  5 - 40 mg/dL Final    LDL, calculated 06/24/2016 94  0 - 99 mg/dL Final    INTERPRETATION 06/24/2016 Note   Final    Supplement report is available. Objective:       Exam:  Visit Vitals    /72    Pulse 96    Wt 96.6 kg (213 lb)    PF 98 L/min    BMI 34.38 kg/m2     Gen: Awake, alert, follows commands  Appropriate appearance, normal speech. Oriented to all spheres. No visual field defect on confrontation exam.  Full eyes movement, with no nystagmus, no diplopia, no ptosis. Normal gag and swallow.   All remaining cranial nerves were normal  Motor function: 5/5 in all extremities  Sensory: intact to LT, PP and JPS  Good FTN and HTS   Gait: Normal    Assessment:       ICD-10-CM ICD-9-CM    1. Migraine without aura and without status migrainosus, not intractable G43.009 346.10            Plan:   1. Continue Topamax 50 mg QHS as migraine prophylaxis. Advise to monitor if cognitive issues worsen with increase of Topamax  2. Continue Imitrex 100 mg prn to try to abort headaches. Given samples of Treximet and Cambia to try  3. Continue Phenergan 25 mg prn for nausea  4. Continue headache diary and went through the list that can trigger headache (stress)      Follow-up Disposition:  Return in about 3 months (around 2/28/2018).           Mehnaz Guzman MD  Diplomate, American Board of Psychiatry and Neurology  Diplomate, Neuromuscular Medicine  Diplomate, American Board of Electrodiagnostic Medicine

## 2017-11-28 NOTE — PATIENT INSTRUCTIONS
[facility]   Statement to Patients  April 1, 2014      In an effort to ensure the large volume of patient prescription refills is processed in the most efficient and expeditious manner, we are asking our patients to assist us by calling your Pharmacy for all prescription refills, this will include also your  Mail Order Pharmacy. The pharmacy will contact our office electronically to continue the refill process. Please do not wait until the last minute to call your pharmacy. We need at least 48 hours (2days) to fill prescriptions. We also encourage you to call your pharmacy before going to  your prescription to make sure it is ready. With regard to controlled substance prescription refill requests (narcotic refills) that need to be picked up at our office, we ask your cooperation by providing us with at least 72 hours (3days) notice that you will need a refill. We will not refill narcotic prescription refill requests after 4:00pm on any weekday, Monday through Thursday, or after 2:00pm on Fridays, or on the weekends. We encourage everyone to explore another way of getting your prescription refill request processed using [employer], our patient web portal through our electronic medical record system. [employer] is an efficient and effective way to communicate your medication request directly to the office and  downloadable as an sam on your smart phone . [employer] also features a review functionality that allows you to view your medication list as well as leave messages for your physician. Are you ready to get connected? If so please review the attatched instructions or speak to any of our staff to get you set up right away! Thank you so much for your cooperation. Should you have any questions please contact our Practice Administrator.     The Physicians and Staff,  [facility]        Migraine Headache: Care Instructions  Your Care Instructions  Migraines are painful, throbbing headaches that often start on one side of the head. They may cause nausea and vomiting and make you sensitive to light, sound, or smell. Without treatment, migraines can last from 4 hours to a few days. Medicines can help prevent migraines or stop them after they have started. Your doctor can help you find which ones work best for you. Follow-up care is a key part of your treatment and safety. Be sure to make and go to all appointments, and call your doctor if you are having problems. It's also a good idea to know your test results and keep a list of the medicines you take. How can you care for yourself at home? · Do not drive if you have taken a prescription pain medicine. · Rest in a quiet, dark room until your headache is gone. Close your eyes, and try to relax or go to sleep. Don't watch TV or read. · Put a cold, moist cloth or cold pack on the painful area for 10 to 20 minutes at a time. Put a thin cloth between the cold pack and your skin. · Use a warm, moist towel or a heating pad set on low to relax tight shoulder and neck muscles. · Have someone gently massage your neck and shoulders. · Take your medicines exactly as prescribed. Call your doctor if you think you are having a problem with your medicine. You will get more details on the specific medicines your doctor prescribes. · Be careful not to take pain medicine more often than the instructions allow. You could get worse or more frequent headaches when the medicine wears off. To prevent migraines  · Keep a headache diary so you can figure out what triggers your headaches. Avoiding triggers may help you prevent headaches. Record when each headache began, how long it lasted, and what the pain was like. (Was it throbbing, aching, stabbing, or dull?) Write down any other symptoms you had with the headache, such as nausea, flashing lights or dark spots, or sensitivity to bright light or loud noise. Note if the headache occurred near your period. List anything that might have triggered the headache. Triggers may include certain foods (chocolate, cheese, wine) or odors, smoke, bright light, stress, or lack of sleep. · If your doctor has prescribed medicine for your migraines, take it as directed. You may have medicine that you take only when you get a migraine and medicine that you take all the time to help prevent migraines. ¨ If your doctor has prescribed medicine for when you get a headache, take it at the first sign of a migraine, unless your doctor has given you other instructions. ¨ If your doctor has prescribed medicine to prevent migraines, take it exactly as prescribed. Call your doctor if you think you are having a problem with your medicine. · Find healthy ways to deal with stress. Migraines are most common during or right after stressful times. Take time to relax before and after you do something that has caused a migraine in the past.  · Try to keep your muscles relaxed by keeping good posture. Check your jaw, face, neck, and shoulder muscles for tension. Try to relax them. When you sit at a desk, change positions often. And make sure to stretch for 30 seconds each hour. · Get plenty of sleep and exercise. · Eat meals on a regular schedule. Avoid foods and drinks that often trigger migraines. These include chocolate, alcohol (especially red wine and port), aspartame, monosodium glutamate (MSG), and some additives found in foods (such as hot dogs, parrish, cold cuts, aged cheeses, and pickled foods). · Limit caffeine. Don't drink too much coffee, tea, or soda. But don't quit caffeine suddenly. That can also give you migraines. · Do not smoke or allow others to smoke around you. If you need help quitting, talk to your doctor about stop-smoking programs and medicines. These can increase your chances of quitting for good.   · If you are taking birth control pills or hormone therapy, talk to your doctor about whether they are triggering your migraines. When should you call for help? Call 911 anytime you think you may need emergency care. For example, call if:  ? · You have signs of a stroke. These may include:  ¨ Sudden numbness, paralysis, or weakness in your face, arm, or leg, especially on only one side of your body. ¨ Sudden vision changes. ¨ Sudden trouble speaking. ¨ Sudden confusion or trouble understanding simple statements. ¨ Sudden problems with walking or balance. ¨ A sudden, severe headache that is different from past headaches. ?Call your doctor now or seek immediate medical care if:  ? · You have new or worse nausea and vomiting. ? · You have a new or higher fever. ? · Your headache gets much worse. ? Watch closely for changes in your health, and be sure to contact your doctor if:  ? · You are not getting better after 2 days (48 hours). Where can you learn more? Go to http://daisy-millicent.info/. Enter V036 in the search box to learn more about \"Migraine Headache: Care Instructions. \"  Current as of: October 14, 2016  Content Version: 11.4  © 1514-1619 [employer]. Care instructions adapted under license by [employer] (which disclaims liability or warranty for this information). If you have questions about a medical condition or this instruction, always ask your healthcare professional. [place] [address] any warranty or liability for your use of this information.

## 2017-11-29 ENCOUNTER — TELEPHONE (OUTPATIENT)
Dept: NEUROLOGY | Age: 47
End: 2017-11-29

## 2017-11-29 NOTE — TELEPHONE ENCOUNTER
----- Message from Justus Vivas sent at 11/29/2017  3:53 PM EST -----  Regarding: /telephone  Pt is requesting a call back in regards to RX. Best contact [phone].

## 2017-12-01 RX ORDER — SUMATRIPTAN AND NAPROXEN SODIUM 85; 500 MG/1; MG/1
TABLET, FILM COATED ORAL
Qty: 9 TAB | Refills: 4 | Status: SHIPPED | OUTPATIENT
Start: 2017-12-01 | End: 2018-01-06

## 2017-12-01 NOTE — TELEPHONE ENCOUNTER
Order placed for Treximet, # 9 tabs, PO, per Verbal Order from Dr. Chrissy Faria on 12/1/2017 due to headache.

## 2017-12-11 RX ORDER — PROMETHAZINE HYDROCHLORIDE 25 MG/1
TABLET ORAL
Qty: 30 TAB | Refills: 1 | Status: SHIPPED | OUTPATIENT
Start: 2017-12-11 | End: 2018-02-07 | Stop reason: SDUPTHER

## 2018-01-05 ENCOUNTER — HOSPITAL ENCOUNTER (EMERGENCY)
Age: 48
Discharge: HOME | End: 2018-01-06
Attending: EMERGENCY MEDICINE
Payer: COMMERCIAL

## 2018-01-05 DIAGNOSIS — R07.9 CHEST PAIN, UNSPECIFIED TYPE: Primary | ICD-10-CM

## 2018-01-05 PROCEDURE — 99285 EMERGENCY DEPT VISIT HI MDM: CPT

## 2018-01-06 ENCOUNTER — APPOINTMENT (OUTPATIENT)
Dept: GENERAL RADIOLOGY | Age: 48
End: 2018-01-06
Attending: EMERGENCY MEDICINE
Payer: COMMERCIAL

## 2018-01-06 VITALS
BODY MASS INDEX: 32.14 KG/M2 | HEIGHT: 66 IN | TEMPERATURE: 97.9 F | OXYGEN SATURATION: 98 % | DIASTOLIC BLOOD PRESSURE: 65 MMHG | RESPIRATION RATE: 18 BRPM | WEIGHT: 200 LBS | SYSTOLIC BLOOD PRESSURE: 138 MMHG | HEART RATE: 79 BPM

## 2018-01-06 LAB
ALBUMIN SERPL-MCNC: 3.6 G/DL (ref 3.5–5)
ALBUMIN/GLOB SERPL: 1.1 {RATIO} (ref 1.1–2.2)
ALP SERPL-CCNC: 69 U/L (ref 45–117)
ALT SERPL-CCNC: 9 U/L (ref 12–78)
ANION GAP SERPL CALC-SCNC: 5 MMOL/L (ref 5–15)
AST SERPL-CCNC: 14 U/L (ref 15–37)
BASOPHILS # BLD: 0 K/UL (ref 0–0.1)
BASOPHILS NFR BLD: 0 % (ref 0–1)
BILIRUB SERPL-MCNC: 0.2 MG/DL (ref 0.2–1)
BUN SERPL-MCNC: 17 MG/DL (ref 6–20)
BUN/CREAT SERPL: 18 (ref 12–20)
CALCIUM SERPL-MCNC: 9.4 MG/DL (ref 8.5–10.1)
CHLORIDE SERPL-SCNC: 105 MMOL/L (ref 97–108)
CO2 SERPL-SCNC: 30 MMOL/L (ref 21–32)
CREAT SERPL-MCNC: 0.93 MG/DL (ref 0.55–1.02)
EOSINOPHIL # BLD: 0 K/UL (ref 0–0.4)
EOSINOPHIL NFR BLD: 0 % (ref 0–7)
ERYTHROCYTE [DISTWIDTH] IN BLOOD BY AUTOMATED COUNT: 12.8 % (ref 11.5–14.5)
GLOBULIN SER CALC-MCNC: 3.3 G/DL (ref 2–4)
GLUCOSE SERPL-MCNC: 101 MG/DL (ref 65–100)
HCT VFR BLD AUTO: 38.2 % (ref 35–47)
HGB BLD-MCNC: 12.4 G/DL (ref 11.5–16)
LYMPHOCYTES # BLD: 2.7 K/UL (ref 0.8–3.5)
LYMPHOCYTES NFR BLD: 30 % (ref 12–49)
MCH RBC QN AUTO: 31.1 PG (ref 26–34)
MCHC RBC AUTO-ENTMCNC: 32.5 G/DL (ref 30–36.5)
MCV RBC AUTO: 95.7 FL (ref 80–99)
MONOCYTES # BLD: 0.5 K/UL (ref 0–1)
MONOCYTES NFR BLD: 6 % (ref 5–13)
NEUTS SEG # BLD: 5.8 K/UL (ref 1.8–8)
NEUTS SEG NFR BLD: 64 % (ref 32–75)
PLATELET # BLD AUTO: 281 K/UL (ref 150–400)
POTASSIUM SERPL-SCNC: 4 MMOL/L (ref 3.5–5.1)
PROT SERPL-MCNC: 6.9 G/DL (ref 6.4–8.2)
RBC # BLD AUTO: 3.99 M/UL (ref 3.8–5.2)
SODIUM SERPL-SCNC: 140 MMOL/L (ref 136–145)
TROPONIN I SERPL-MCNC: <0.04 NG/ML
TROPONIN I SERPL-MCNC: <0.04 NG/ML
WBC # BLD AUTO: 9.1 K/UL (ref 3.6–11)

## 2018-01-06 PROCEDURE — 85025 COMPLETE CBC W/AUTO DIFF WBC: CPT | Performed by: EMERGENCY MEDICINE

## 2018-01-06 PROCEDURE — 71046 X-RAY EXAM CHEST 2 VIEWS: CPT

## 2018-01-06 PROCEDURE — 74011250636 HC RX REV CODE- 250/636: Performed by: EMERGENCY MEDICINE

## 2018-01-06 PROCEDURE — 84484 ASSAY OF TROPONIN QUANT: CPT | Performed by: EMERGENCY MEDICINE

## 2018-01-06 PROCEDURE — 36415 COLL VENOUS BLD VENIPUNCTURE: CPT | Performed by: EMERGENCY MEDICINE

## 2018-01-06 PROCEDURE — 74011250637 HC RX REV CODE- 250/637: Performed by: EMERGENCY MEDICINE

## 2018-01-06 PROCEDURE — 80053 COMPREHEN METABOLIC PANEL: CPT | Performed by: EMERGENCY MEDICINE

## 2018-01-06 PROCEDURE — 96374 THER/PROPH/DIAG INJ IV PUSH: CPT

## 2018-01-06 PROCEDURE — 93005 ELECTROCARDIOGRAM TRACING: CPT

## 2018-01-06 RX ORDER — KETOROLAC TROMETHAMINE 30 MG/ML
30 INJECTION, SOLUTION INTRAMUSCULAR; INTRAVENOUS
Status: COMPLETED | OUTPATIENT
Start: 2018-01-06 | End: 2018-01-06

## 2018-01-06 RX ORDER — ASPIRIN 325 MG
325 TABLET ORAL
Status: COMPLETED | OUTPATIENT
Start: 2018-01-06 | End: 2018-01-06

## 2018-01-06 RX ORDER — ESCITALOPRAM OXALATE 10 MG/1
10 TABLET ORAL DAILY
COMMUNITY
End: 2018-03-12

## 2018-01-06 RX ORDER — FAMOTIDINE 20 MG/1
20 TABLET, FILM COATED ORAL
Status: COMPLETED | OUTPATIENT
Start: 2018-01-06 | End: 2018-01-06

## 2018-01-06 RX ADMIN — FAMOTIDINE 20 MG: 20 TABLET, FILM COATED ORAL at 01:09

## 2018-01-06 RX ADMIN — ASPIRIN 325 MG: 325 TABLET, COATED ORAL at 01:09

## 2018-01-06 RX ADMIN — KETOROLAC TROMETHAMINE 30 MG: 30 INJECTION, SOLUTION INTRAMUSCULAR at 01:10

## 2018-01-06 NOTE — ED PROVIDER NOTES
HPI Comments: 50 y.o. female with past medical history significant for depression, psychosis, Schizoaffective disorder, PUD, who presents from home via EMS with chief complaint of substernal CP that radiates throughout the ribs into the upper back and shoulder blades x ~1 hour. Pt notes that she was sitting in her kitchen, was getting ready for bed, and had just taken her nighttime dose of medications when the pain started. Pt reports that her pain has been gradually worsening in severity, and is now worse compared to onset at a 7/10 in severity. The pain is constant, but exacerbated with deep inspiration. She describes the pain as a \"pressure\", and additionally c/o associated SOB. 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 Pt denies recent heavy lifting, injury, or trauma, and she denies any cardiac hx. Pt did not take ASA or other pain medications PTA, and she notes EMS did not administer any medications en route ot the ED. Pt specifically denies any vomiting, cough, or fevers. There are no other acute medical concerns at this time. Social hx: Negative  for Tobacco use (former smoker); Positive for EtOH use (occasional drinker, has had 1 drink in the past 24 hours); Negative for Illicit Drug Abuse   PCP: Car Fowler MD     Note written by Alta Roman. Leandro Buenrostro, as dictated by Andre Jacques MD 12:31 AM     The history is provided by the patient. No  was used.         Past Medical History:   Diagnosis Date    Back pain     Borderline personality disorder     Dysthymic disorder     Headache     Insomnia, unspecified     Post traumatic stress disorder (PTSD)     Psychosis     PUD (peptic ulcer disease)     Schizoaffective disorder ([facility] [address]        Past Surgical History:   Procedure Laterality Date    HX CHOLECYSTECTOMY      2012    HX GI  1971    Ulcer surgery of stomach Family History:   Problem Relation Age of Onset    Hypertension Father     Psychiatric Disorder Father      ETOH    Arthritis-osteo Mother     Thyroid Disease Mother     Stroke Maternal Grandmother     Malignant Hyperthermia Neg Hx     Pseudocholinesterase Deficiency Neg Hx     Delayed Awakening Neg Hx     Post-op Nausea/Vomiting Neg Hx     Emergence Delirium Neg Hx     Post-op Cognitive Dysfunction Neg Hx     Other Neg Hx        Social History     Social History    Marital status: SINGLE     Spouse name: N/A    Number of children: N/A    Years of education: N/A     Occupational History    Not on file. Social History Main Topics    Smoking status: Former Smoker     Packs/day: 1.00     Years: 1.50     Types: Cigarettes    Smokeless tobacco: Never Used    Alcohol use No    Drug use: No      Comment: 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    Sexual activity: No     Other Topics Concern    Not on file     Social History Narrative         ALLERGIES: Penicillin g and Aspirin    Review of Systems   Constitutional: Negative for fever. HENT: Negative for facial swelling. Eyes: Negative for visual disturbance. Respiratory: Positive for shortness of breath. Negative for cough and chest tightness. Cardiovascular: Positive for chest pain. Gastrointestinal: Negative for abdominal pain and vomiting. Genitourinary: Negative for dysuria. Musculoskeletal: Positive for back pain. Negative for arthralgias. Skin: Negative for rash. Neurological: Negative for dizziness. Hematological: Negative for adenopathy. Psychiatric/Behavioral: Negative for suicidal ideas. Vitals:    01/05/18 2358 01/06/18 0007   BP: 142/67    Pulse: 75    Resp: 20    Temp: 97.9 °F (36.6 °C)    SpO2: 97% 97%   Weight: 90.7 kg (200 lb)    Height: 5' 6\" (1.676 m)             Physical Exam   Constitutional: She is oriented to person, place, and time. She appears well-developed and well-nourished.  No distress. HENT:   Head: Normocephalic and atraumatic. Mouth/Throat: Oropharynx is clear and moist.   Eyes: Pupils are equal, round, and reactive to light. No scleral icterus. Neck: Normal range of motion. Neck supple. No thyromegaly present. Cardiovascular: Normal rate, regular rhythm, normal heart sounds and intact distal pulses. No murmur heard. Pulmonary/Chest: Effort normal and breath sounds normal. No respiratory distress. Abdominal: Soft. Bowel sounds are normal. She exhibits no distension. There is no tenderness. Musculoskeletal: Normal range of motion. She exhibits no edema. Neurological: She is alert and oriented to person, place, and time. Skin: Skin is warm and dry. No rash noted. She is not diaphoretic. Nursing note and vitals reviewed. Note written by Jay Batista. Jose Luis Asencio, as dictated by Silvana Douglass MD 12:31 AM      MDM  Number of Diagnoses or Management Options  Chest pain, unspecified type:     ED Course       Procedures    A:   50 y.o. Female with chest pain. VS are normal with unremarkable PE. Pt very low risk for PE or ACS. Possible etiologies include musculoskeletal pain, pericarditis, myocarditis, tachyarrhythmia, ptx, reactive airway, costochondritis. P:  ECG  CXR  Labs  reasses    ED EKG interpretation:  Rhythm: normal sinus rhythm. Rate (approx.): 75. Axis: normal.  ST segment:  No concerning ST elevations or depressions. This EKG was interpreted by Silvana Douglass MD,ED Provider. Chest X-ray, 2 view:  Chest xray, PA and Lat, shows no signs of acute disease. This CXR was interpreted by Silvana Douglass MD, ED Provider. Lab Results Significant For:  CBC:  unremarkable  CMP:  unremarkable  Trop:  unremarkable    2:50 AM  Repeat trop neg.    2:50 AM  Patient resting comfortably with no complaints at this time. VS remain stable. Repeat physical exam is unremarkable. Pt ambulatory without difficulty and tolerating po's well.   No evidence of cardiac or other dangerous pathology. VS normal.  Pt resting comfortably with no complaints at this time. Stable for discharge home. F/u with PCP.

## 2018-01-06 NOTE — DISCHARGE INSTRUCTIONS
Chest Pain: Care Instructions  Your Care Instructions    There are many things that can cause chest pain. Some are not serious and will get better on their own in a few days. But some kinds of chest pain need more testing and treatment. Your doctor may have recommended a follow-up visit in the next 8 to 12 hours. If you are not getting better, you may need more tests or treatment. Even though your doctor has released you, you still need to watch for any problems. The doctor carefully checked you, but sometimes problems can develop later. If you have new symptoms or if your symptoms do not get better, get medical care right away. If you have worse or different chest pain or pressure that lasts more than 5 minutes or you passed out (lost consciousness), call 911 or seek other emergency help right away. A medical visit is only one step in your treatment. Even if you feel better, you still need to do what your doctor recommends, such as going to all suggested follow-up appointments and taking medicines exactly as directed. This will help you recover and help prevent future problems. How can you care for yourself at home? · Rest until you feel better. · Take your medicine exactly as prescribed. Call your doctor if you think you are having a problem with your medicine. · Do not drive after taking a prescription pain medicine. When should you call for help? Call 911 if:  ? · You passed out (lost consciousness). ? · You have severe difficulty breathing. ? · You have symptoms of a heart attack. These may include:  ¨ Chest pain or pressure, or a strange feeling in your chest.  ¨ Sweating. ¨ Shortness of breath. ¨ Nausea or vomiting. ¨ Pain, pressure, or a strange feeling in your back, neck, jaw, or upper belly or in one or both shoulders or arms. ¨ Lightheadedness or sudden weakness. ¨ A fast or irregular heartbeat.   After you call 911, the  may tell you to chew 1 adult-strength or 2 to 4 low-dose aspirin. Wait for an ambulance. Do not try to drive yourself. ?Call your doctor today if:  ? · You have any trouble breathing. ? · Your chest pain gets worse. ? · You are dizzy or lightheaded, or you feel like you may faint. ? · You are not getting better as expected. ? · You are having new or different chest pain. Where can you learn more? Go to http://daisy-millicent.info/. Enter A120 in the search box to learn more about \"Chest Pain: Care Instructions. \"  Current as of: March 20, 2017  Content Version: 11.4  © 9863-4029 [employer]. Care instructions adapted under license by [employer] (which disclaims liability or warranty for this information). If you have questions about a medical condition or this instruction, always ask your healthcare professional. [place] [address] any warranty or liability for your use of this information. We hope that we have addressed all of your medical concerns. The examination and treatment you received in the Emergency Department were for an emergent problem and were not intended as complete care. It is important that you follow up with your healthcare provider(s) for ongoing care. If your symptoms worsen or do not improve as expected, and you are unable to reach your usual health care provider(s), you should return to the Emergency Department. Today's healthcare is undergoing tremendous change, and patient satisfaction surveys are one of the many tools to assess the quality of medical care. You may receive a survey from the [employer] organization regarding your experience in the Emergency Department. I hope that your experience has been completely positive, particularly the medical care that I provided. As such, please participate in the survey; anything less than excellent does not meet my expectations or intentions.         [facility] and [employer] participate in nationally recognized quality of care measures. If your blood pressure is greater than 120/80, as reported below, we urge that you seek medical care to address the potential of high blood pressure, commonly known as hypertension. Hypertension can be hereditary or can be caused by certain medical conditions, pain, stress, or \"white coat syndrome. \"       Please make an appointment with your health care provider(s) for follow up of your Emergency Department visit. VITALS:   Patient Vitals for the past 8 hrs:   Temp Pulse Resp BP SpO2   01/06/18 0030 - 76 12 125/72 99 %   01/06/18 0015 - 74 16 138/65 97 %   01/06/18 0007 - - - - 97 %   01/05/18 2358 97.9 °F (36.6 °C) 75 20 142/67 97 %          Thank you for allowing us to provide you with medical care today. We realize that you have many choices for your emergency care needs. Please choose us in the future for any continued health care needs. Stacey Moreland MD    [facility], [facility].   Office: [phone]            Recent Results (from the past 24 hour(s))   CBC WITH AUTOMATED DIFF    Collection Time: 01/06/18 12:45 AM   Result Value Ref Range    WBC 9.1 3.6 - 11.0 K/uL    RBC 3.99 3.80 - 5.20 M/uL    HGB 12.4 11.5 - 16.0 g/dL    HCT 38.2 35.0 - 47.0 %    MCV 95.7 80.0 - 99.0 FL    MCH 31.1 26.0 - 34.0 PG    MCHC 32.5 30.0 - 36.5 g/dL    RDW 12.8 11.5 - 14.5 %    PLATELET 986 938 - 991 K/uL    NEUTROPHILS 64 32 - 75 %    LYMPHOCYTES 30 12 - 49 %    MONOCYTES 6 5 - 13 %    EOSINOPHILS 0 0 - 7 %    BASOPHILS 0 0 - 1 %    ABS. NEUTROPHILS 5.8 1.8 - 8.0 K/UL    ABS. LYMPHOCYTES 2.7 0.8 - 3.5 K/UL    ABS. MONOCYTES 0.5 0.0 - 1.0 K/UL    ABS. EOSINOPHILS 0.0 0.0 - 0.4 K/UL    ABS.  BASOPHILS 0.0 0.0 - 0.1 K/UL   METABOLIC PANEL, COMPREHENSIVE    Collection Time: 01/06/18 12:45 AM   Result Value Ref Range    Sodium 140 136 - 145 mmol/L    Potassium 4.0 3.5 - 5.1 mmol/L    Chloride 105 97 - 108 mmol/L CO2 30 21 - 32 mmol/L    Anion gap 5 5 - 15 mmol/L    Glucose 101 (H) 65 - 100 mg/dL    BUN 17 6 - 20 MG/DL    Creatinine 0.93 0.55 - 1.02 MG/DL    BUN/Creatinine ratio 18 12 - 20      GFR est AA >60 >60 ml/min/1.73m2    GFR est non-AA >60 >60 ml/min/1.73m2    Calcium 9.4 8.5 - 10.1 MG/DL    Bilirubin, total 0.2 0.2 - 1.0 MG/DL    ALT (SGPT) 9 (L) 12 - 78 U/L    AST (SGOT) 14 (L) 15 - 37 U/L    Alk. phosphatase 69 45 - 117 U/L    Protein, total 6.9 6.4 - 8.2 g/dL    Albumin 3.6 3.5 - 5.0 g/dL    Globulin 3.3 2.0 - 4.0 g/dL    A-G Ratio 1.1 1.1 - 2.2     TROPONIN I    Collection Time: 01/06/18 12:45 AM   Result Value Ref Range    Troponin-I, Qt. <0.04 <0.05 ng/mL   TROPONIN I    Collection Time: 01/06/18  2:03 AM   Result Value Ref Range    Troponin-I, Qt. <0.04 <0.05 ng/mL       Xr Chest Pa Lat    Result Date: 1/6/2018  INDICATION:  cp Exam: Chest 2 views. Comparison: March 18, 2015. Findings: Cardiomediastinal silhouette is normal. Pulmonary vasculature is not engorged. No focal parenchymal opacities, effusions, or pneumothorax. Bony thorax is intact. Impression: 1.  No acute cardiopulmonary disease

## 2018-01-06 NOTE — ED TRIAGE NOTES
Pt arrives via EMS with c/o chest pain that began about an hour ago and gotten worse. Pt has no cardiac history.

## 2018-01-07 LAB
ATRIAL RATE: 75 BPM
CALCULATED P AXIS, ECG09: 47 DEGREES
CALCULATED R AXIS, ECG10: 21 DEGREES
CALCULATED T AXIS, ECG11: 53 DEGREES
DIAGNOSIS, 93000: NORMAL
P-R INTERVAL, ECG05: 152 MS
Q-T INTERVAL, ECG07: 410 MS
QRS DURATION, ECG06: 86 MS
QTC CALCULATION (BEZET), ECG08: 457 MS
VENTRICULAR RATE, ECG03: 75 BPM

## 2018-01-08 ENCOUNTER — OFFICE VISIT (OUTPATIENT)
Dept: FAMILY MEDICINE CLINIC | Age: 48
End: 2018-01-08

## 2018-01-08 VITALS
BODY MASS INDEX: 34.87 KG/M2 | DIASTOLIC BLOOD PRESSURE: 61 MMHG | HEART RATE: 98 BPM | OXYGEN SATURATION: 98 % | HEIGHT: 66 IN | WEIGHT: 217 LBS | RESPIRATION RATE: 20 BRPM | SYSTOLIC BLOOD PRESSURE: 114 MMHG | TEMPERATURE: 97.2 F

## 2018-01-08 DIAGNOSIS — R07.9 CHEST PAIN, UNSPECIFIED TYPE: Primary | ICD-10-CM

## 2018-01-08 DIAGNOSIS — R06.02 SHORTNESS OF BREATH: ICD-10-CM

## 2018-01-08 NOTE — PROGRESS NOTES
HISTORY OF PRESENT ILLNESS  Checo Almonte is a 50 y.o. female. HPI  Pt presents with \"ER follow up for chest pain and nausea\"    Pt went to the [facility] ER on 1/5 for symptoms of chest pain, nausea, and shortness of breath. Pt's work up was negative, and ER note was reviewed. EKG was normal, blood work was normal, as was chest x-ray. Pt states that symptoms have continued, and are about the same as when she was seen in the ER. Pt states that the pain comes and goes, and she describes the pain as a pressure sensation. Pain is not worse with movement. At times, she also feels short of breath. This has been going on longer then the chest pain, for probably about 2-3 weeks. Nausea is also present off and on. Pt states that she has history of GERD, but this nly flares up when she is anxious. She is not currently experiencing increase or change in GERD. No diarrhea. Pain is not associated more so with eating. Review of Systems   Constitutional: Negative for fever. HENT: Negative for congestion. Respiratory: Positive for shortness of breath. Cardiovascular: Positive for chest pain. Gastrointestinal: Positive for nausea. Negative for diarrhea and vomiting. Physical Exam   Constitutional: She is oriented to person, place, and time. She appears well-developed and well-nourished. HENT:   Head: Normocephalic and atraumatic. Cardiovascular: Normal rate, regular rhythm and normal heart sounds. Pulmonary/Chest: Effort normal and breath sounds normal. She has no wheezes. She has no rales. She exhibits no tenderness. Neurological: She is alert and oriented to person, place, and time. Skin: Skin is warm and dry. Psychiatric: She has a normal mood and affect. Her behavior is normal.       ASSESSMENT and PLAN    ICD-10-CM ICD-9-CM    1.  Chest pain, unspecified type R07.9 786.50 REFERRAL TO CARDIOLOGY   2. Shortness of breath R06.02 786.05 REFERRAL TO CARDIOLOGY     Due to continued symptoms, informed patient that she needs further work up from cardiology. Educated about calling today for an appointment ASAP. Educated about ALWAYS calling 911 or returning to ER with worsening of symptoms, chest pain, shortness of breath, etc.      Pt informed to return to office with worsening of symptoms, or PRN with any questions or concerns. Pt verbalizes understanding of plan of care and denies further questions or concerns at this time.

## 2018-01-08 NOTE — PROGRESS NOTES
Chief Complaint   Patient presents with    Nausea     started over 2 weeks ago   [facility] Follow Up     seen at [facility] 3 days ago for chest pain and SOB. EKG, CXR, and labs done. \"REVIEWED RECORD IN PREPARATION FOR VISIT AND HAVE OBTAINED THE NECESSARY DOCUMENTATION\"  1. Have you been to the ER, urgent care clinic since your last visit? Hospitalized since your last visit? Yes Where: see above    2. Have you seen or consulted any other health care providers outside of the [address] [place] [address] since your last visit? Include any pap smears or colon screening. No  Patient does not have advanced directives.

## 2018-01-08 NOTE — PATIENT INSTRUCTIONS
Chest Pain: Care Instructions  Your Care Instructions    There are many things that can cause chest pain. Some are not serious and will get better on their own in a few days. But some kinds of chest pain need more testing and treatment. Your doctor may have recommended a follow-up visit in the next 8 to 12 hours. If you are not getting better, you may need more tests or treatment. Even though your doctor has released you, you still need to watch for any problems. The doctor carefully checked you, but sometimes problems can develop later. If you have new symptoms or if your symptoms do not get better, get medical care right away. If you have worse or different chest pain or pressure that lasts more than 5 minutes or you passed out (lost consciousness), call 911 or seek other emergency help right away. A medical visit is only one step in your treatment. Even if you feel better, you still need to do what your doctor recommends, such as going to all suggested follow-up appointments and taking medicines exactly as directed. This will help you recover and help prevent future problems. How can you care for yourself at home? · Rest until you feel better. · Take your medicine exactly as prescribed. Call your doctor if you think you are having a problem with your medicine. · Do not drive after taking a prescription pain medicine. When should you call for help? Call 911 if:  ? · You passed out (lost consciousness). ? · You have severe difficulty breathing. ? · You have symptoms of a heart attack. These may include:  ¨ Chest pain or pressure, or a strange feeling in your chest.  ¨ Sweating. ¨ Shortness of breath. ¨ Nausea or vomiting. ¨ Pain, pressure, or a strange feeling in your back, neck, jaw, or upper belly or in one or both shoulders or arms. ¨ Lightheadedness or sudden weakness. ¨ A fast or irregular heartbeat.   After you call 911, the  may tell you to chew 1 adult-strength or 2 to 4 low-dose aspirin. Wait for an ambulance. Do not try to drive yourself. ?Call your doctor today if:  ? · You have any trouble breathing. ? · Your chest pain gets worse. ? · You are dizzy or lightheaded, or you feel like you may faint. ? · You are not getting better as expected. ? · You are having new or different chest pain. Where can you learn more? Go to http://daisy-millicent.info/. Enter A120 in the search box to learn more about \"Chest Pain: Care Instructions. \"  Current as of: March 20, 2017  Content Version: 11.4  © 8838-1018 [employer]. Care instructions adapted under license by [employer] (which disclaims liability or warranty for this information). If you have questions about a medical condition or this instruction, always ask your healthcare professional. [address] any warranty or liability for your use of this information.

## 2018-01-08 NOTE — MR AVS SNAPSHOT
Visit Information Date & Time Provider Department Dept. Phone Encounter #  
 1/8/2018 10:30 AM Alicia MendozaYoung [phone] [id number] Follow-up Instructions Return if symptoms worsen or fail to improve. Your Appointments 1/8/2018 10:30 AM  
ACUTE CARE with Alicia Mendoza  [address] ([address]) Appt Note: nausea issues for about a week come and go [address] [facility] [address]  
[phone]  
  
   
 [address] [address]  
  
    
 2/27/2018 11:40 AM  
Follow Up with Armando Simpson MD  
[facility]) Appt Note: 3 month f/u migraine [place] [address] [facility] [address] [facility] [address] [phone]  
  
   
 [place] [address] [id number] [address] Upcoming Health Maintenance Date Due Influenza Age 5 to Adult 8/1/2017 Pneumococcal 19-64 Highest Risk (3 of 3 - PCV13) 6/23/2018 PAP AKA CERVICAL CYTOLOGY 6/23/2020 DTaP/Tdap/Td series (2 - Td) 6/23/2027 Allergies as of 1/8/2018  Review Complete On: 1/8/2018 By: Alicia Mendoza NP Severity Noted Reaction Type Reactions Penicillin G High 10/01/2012   Systemic Anaphylaxis Aspirin  10/01/2012   Side Effect Nausea and Vomiting Pt states she isn't allergic to ASA she just can't take  due to gastric ulcers. Current Immunizations  Reviewed on 3/17/2015 Name Date Influenza Vaccine PF 3/16/2015  5:12 AM  
 Pneumococcal Polysaccharide (PPSV-23) 3/16/2015  5:15 AM  
  
 Not reviewed this visit You Were Diagnosed With   
  
 Codes Comments Chest pain, unspecified type    -  Primary ICD-10-CM: R07.9 ICD-9-CM: 786.50 Shortness of breath     ICD-10-CM: R06.02 
ICD-9-CM: 786.05 Vitals BP Pulse Temp Resp Height(growth percentile) Weight(growth percentile) 114/61 (BP 1 Location: Left arm, BP Patient Position: Sitting) 98 97.2 °F (36.2 °C) (Oral) 20 5' 6\" (1.676 m) 217 lb (98.4 kg) LMP SpO2 BMI OB Status Smoking Status 12/24/2017 98% 35.02 kg/m2 Having regular periods Former Smoker BMI and BSA Data Body Mass Index Body Surface Area 35.02 kg/m 2 2.14 m 2 Preferred Pharmacy Pharmacy Name Phone [id number] - [place], [address] [place] [address] [phone] Your Updated Medication List  
  
   
This list is accurate as of: 1/8/18 10:17 AM.  Always use your most recent med list.  
  
  
  
  
 benztropine 1 mg tablet Commonly known as:  COGENTIN Take 1 mg by mouth two (2) times a day. buPROPion  mg SR tablet Commonly known as:  Nayan Cristina Take 150 mg by mouth every morning. cloZAPine 100 mg tablet Commonly known as:  CLOZARIL Take 200 mg by mouth nightly. 2 tabs @ hs  
  
 diazePAM 10 mg tablet Commonly known as:  VALIUM  
10 mg two (2) times a day. Indications: anxiety  
  
 divalproex  mg tablet Commonly known as:  DEPAKOTE Take 250 mg by mouth two (2) times a day. escitalopram oxalate 10 mg tablet Commonly known as:  Devante Eliazar Take 10 mg by mouth daily. ibuprofen 600 mg tablet Commonly known as:  MOTRIN Take 1 Tab by mouth every eight (8) hours as needed for Pain.  
  
 lithium carbonate 300 mg tablet Take 1 Tab by mouth daily. Indications: mood stabilization  
  
 prazosin 5 mg capsule Commonly known as:  MINIPRESS Take 2 mg by mouth nightly. promethazine 25 mg tablet Commonly known as:  PHENERGAN  
TAKE ONE-HALF TO ONE TABLET BY MOUTH EVERY 6 HOURS AS NEEDED  
  
 SUMAtriptan 100 mg tablet Commonly known as:  IMITREX  
TAKE ONE TABLET BY MOUTH AT ONSET OF HEADACHE. MAY REPEAT ONE TABLET AFTER 2 HOURS *MAXIMUM OF TWO TABLETS A DAY *  
  
 topiramate 50 mg tablet Commonly known as:  TOPAMAX Take 1 Tab by mouth nightly. ziprasidone 40 mg capsule Commonly known as:  Emeli Herrera Take 40 mg by mouth two (2) times daily (with meals). We Performed the Following REFERRAL TO CARDIOLOGY [IZE80 Custom] Follow-up Instructions Return if symptoms worsen or fail to improve. Referral Information Referral ID Referred By Referred To  
  
 [id number] ONEYDA KEENE MD   
   [address] [place] [address] Phone: [phone] Fax: [phone] Visits Status Start Date End Date 1 New Request 1/8/18 1/8/19 If your referral has a status of pending review or denied, additional information will be sent to support the outcome of this decision. Patient Instructions Chest Pain: Care Instructions Your Care Instructions There are many things that can cause chest pain. Some are not serious and will get better on their own in a few days. But some kinds of chest pain need more testing and treatment. Your doctor may have recommended a follow-up visit in the next 8 to 12 hours. If you are not getting better, you may need more tests or treatment. Even though your doctor has released you, you still need to watch for any problems. The doctor carefully checked you, but sometimes problems can develop later. If you have new symptoms or if your symptoms do not get better, get medical care right away. If you have worse or different chest pain or pressure that lasts more than 5 minutes or you passed out (lost consciousness), call 911 or seek other emergency help right away. A medical visit is only one step in your treatment. Even if you feel better, you still need to do what your doctor recommends, such as going to all suggested follow-up appointments and taking medicines exactly as directed. This will help you recover and help prevent future problems. How can you care for yourself at home? · Rest until you feel better. · Take your medicine exactly as prescribed. Call your doctor if you think you are having a problem with your medicine. · Do not drive after taking a prescription pain medicine. When should you call for help? Call 911 if: 
? · You passed out (lost consciousness). ? · You have severe difficulty breathing. ? · You have symptoms of a heart attack. These may include: ¨ Chest pain or pressure, or a strange feeling in your chest. 
¨ Sweating. ¨ Shortness of breath. ¨ Nausea or vomiting. ¨ Pain, pressure, or a strange feeling in your back, neck, jaw, or upper belly or in one or both shoulders or arms. ¨ Lightheadedness or sudden weakness. ¨ A fast or irregular heartbeat. After you call 911, the  may tell you to chew 1 adult-strength or 2 to 4 low-dose aspirin. Wait for an ambulance. Do not try to drive yourself. ?Call your doctor today if: 
? · You have any trouble breathing. ? · Your chest pain gets worse. ? · You are dizzy or lightheaded, or you feel like you may faint. ? · You are not getting better as expected. ? · You are having new or different chest pain. Where can you learn more? Go to http://daisy-millicent.info/. Enter A120 in the search box to learn more about \"Chest Pain: Care Instructions. \" Current as of: March 20, 2017 Content Version: 11.4 © 6055-5844 [employer]. Care instructions adapted under license by [employer] (which disclaims liability or warranty for this information). If you have questions about a medical condition or this instruction, always ask your healthcare professional. [address] any warranty or liability for your use of this information. Introducing [facility] & HEALTH SERVICES! Dear Indra Hidalgo: Thank you for requesting a [employer] account. Our records indicate that you already have an active [employer] account. You can access your account anytime at [email or web address] Did you know that you can access your hospital and ER discharge instructions at any time in MyGoGames? You can also review all of your test results from your hospital stay or ER visit. Additional Information If you have questions, please visit the Frequently Asked Questions section of the MyGoGames website at [email or web address] Remember, MyGoGames is NOT to be used for urgent needs. For medical emergencies, dial 911. Now available from your iPhone and Android! Please provide this summary of care documentation to your next provider. Your primary care clinician is listed as Duc Berman. If you have any questions after today's visit, please call [phone].

## 2018-01-25 ENCOUNTER — OFFICE VISIT (OUTPATIENT)
Dept: CARDIOLOGY CLINIC | Age: 48
End: 2018-01-25

## 2018-01-25 VITALS
DIASTOLIC BLOOD PRESSURE: 64 MMHG | RESPIRATION RATE: 16 BRPM | HEART RATE: 82 BPM | OXYGEN SATURATION: 98 % | HEIGHT: 66 IN | WEIGHT: 218 LBS | BODY MASS INDEX: 35.03 KG/M2 | SYSTOLIC BLOOD PRESSURE: 106 MMHG

## 2018-01-25 DIAGNOSIS — R06.00 DYSPNEA, UNSPECIFIED TYPE: ICD-10-CM

## 2018-01-25 DIAGNOSIS — R07.9 CHEST PAIN, UNSPECIFIED TYPE: Primary | ICD-10-CM

## 2018-01-25 DIAGNOSIS — E66.9 OBESITY (BMI 35.0-39.9 WITHOUT COMORBIDITY): ICD-10-CM

## 2018-01-25 NOTE — MR AVS SNAPSHOT
[address] [address] [address] [facility] 
[phone] Patient: Monica Pérez MRN: [id number] Visit Information Date & Time Provider Department Dept. Phone Encounter #  
 2018  1:40 PM Lisa Pollock MD [facility] Jennifer Howe [phone] [id number] Your Appointments 2018 11:40 AM  
Follow Up with Shon Petty MD  
[facility]) Appt Note: 3 month f/u migraine [place] [address] [facility] [address] [place] [facility] [id number] [phone]  
  
   
 [place] [address] [address] [address] Upcoming Health Maintenance Date Due Influenza Age 5 to Adult 2017 Pneumococcal 19-64 Highest Risk (3 of 3 - PCV13) 2018 PAP AKA CERVICAL CYTOLOGY 2020 DTaP/Tdap/Td series (2 - Td) 2027 Allergies as of 2018  Review Complete On: 2018 By: Akbar Vargas LPN Severity Noted Reaction Type Reactions Penicillin G High 10/01/2012   Systemic Anaphylaxis Aspirin  10/01/2012   Side Effect Nausea and Vomiting Pt states she isn't allergic to ASA she just can't take  due to gastric ulcers. Current Immunizations  Reviewed on 3/17/2015 Name Date Influenza Vaccine PF 3/16/2015  5:12 AM  
 Pneumococcal Polysaccharide (PPSV-23) 3/16/2015  5:15 AM  
  
 Not reviewed this visit Vitals BP Pulse Resp Height(growth percentile) Weight(growth percentile) SpO2  
 106/64 (BP 1 Location: Left arm, BP Patient Position: Sitting) 82 16 5' 6\" (1.676 m) 218 lb (98.9 kg) 98% BMI OB Status Smoking Status 35.19 kg/m2 Having regular periods Former Smoker Vitals History BMI and BSA Data Body Mass Index Body Surface Area  
 35.19 kg/m 2 2.15 m 2 Preferred Pharmacy Pharmacy Name Phone [id number] - [place], [address] [place] [address] [place] [place] [address] [phone] Your Updated Medication List  
  
   
This list is accurate as of: 1/25/18  2:08 PM.  Always use your most recent med list.  
  
  
  
  
 benztropine 1 mg tablet Commonly known as:  COGENTIN Take 1 mg by mouth two (2) times a day. buPROPion  mg SR tablet Commonly known as:  Tomma Peers Take 150 mg by mouth every morning. cloZAPine 100 mg tablet Commonly known as:  CLOZARIL Take 200 mg by mouth nightly. 2 tabs @ hs  
  
 diazePAM 10 mg tablet Commonly known as:  VALIUM  
10 mg two (2) times a day. Indications: anxiety  
  
 divalproex  mg tablet Commonly known as:  DEPAKOTE Take 250 mg by mouth two (2) times a day. escitalopram oxalate 10 mg tablet Commonly known as:  Clevester Clap Take 10 mg by mouth daily. 2 tabs qam  
  
 ibuprofen 600 mg tablet Commonly known as:  MOTRIN Take 1 Tab by mouth every eight (8) hours as needed for Pain.  
  
 lithium carbonate 300 mg tablet Take 1 Tab by mouth daily. Indications: mood stabilization  
  
 prazosin 5 mg capsule Commonly known as:  MINIPRESS Take 2 mg by mouth nightly. promethazine 25 mg tablet Commonly known as:  PHENERGAN  
TAKE ONE-HALF TO ONE TABLET BY MOUTH EVERY 6 HOURS AS NEEDED  
  
 SUMAtriptan 100 mg tablet Commonly known as:  IMITREX  
TAKE ONE TABLET BY MOUTH AT ONSET OF HEADACHE. MAY REPEAT ONE TABLET AFTER 2 HOURS *MAXIMUM OF TWO TABLETS A DAY *  
  
 topiramate 50 mg tablet Commonly known as:  TOPAMAX Take 1 Tab by mouth nightly. ziprasidone 40 mg capsule Commonly known as:  Lindsey Libia Take 40 mg by mouth two (2) times daily (with meals). Introducing [facility] & HEALTH SERVICES! Dear [facility]: Thank you for requesting a Tresata account. Our records indicate that you already have an active Tresata account. You can access your account anytime at [email or web address] Did you know that you can access your hospital and ER discharge instructions at any time in Mr. Youth? You can also review all of your test results from your hospital stay or ER visit. Additional Information If you have questions, please visit the Frequently Asked Questions section of the Mr. Youth website at [email or web address] Remember, Mr. Youth is NOT to be used for urgent needs. For medical emergencies, dial 911. Now available from your iPhone and Android! Please provide this summary of care documentation to your next provider. Your primary care clinician is listed as Braden Boland. If you have any questions after today's visit, please call [phone].

## 2018-01-25 NOTE — PROGRESS NOTES
Armond Veronica MD    [address] [facility] [place], [address] [facility]    Office [phone],NJD [phone]  Pager [phone]    Clari Sharp is a 50 y.o. female referred for evaluation of chest pain. Consult requested by Salvador Tsang MD      Primary care physician:  Salvador Tsang MD    Dear Dr Krause/ Ms Bunny Canela,    I had the pleasure of seeing Ms Clari Sharp in the office today. Chief complaint:  Clari Sharp is a 50 y.o. female who complains of   Chief Complaint   Patient presents with    Chest Pain     NP comes and goes    Shortness of Breath       Assessment:  Chest pain  Dyspnea  History of which is schizoaffective , generalized anxiety disorder, PTSD  Obesity      Plan:   Jessica nuclear study. I encouraged the patient to walk on a treadmill but patient stated that she has difficulty walking the treadmill. Echocardiogram.  Follow-up in 4-6 weeks or earlier as needed. Patient understands the plan. All questions were answered to the patient's satisfaction. Medication Side Effects and Warnings were discussed with patient: yes  Patient Labs were reviewed and or requested:  yes  Patient Past Records were reviewed and or requested: yes    I appreciate the opportunity to be involved in Ms. Kiran [facility]. Please see note below for details. Please do not hesitate to contact us with questions or concerns. Armond Veronica MD    Cardiac Testing/ Procedures: A. Cardiac Cath/PCI:    B.ECHO/ADRIAN:    C.StressNuclear/Stress ECHO/Stress test:    D.Vascular:    E. EP:    F. Miscellaneous:    History of present illness:    Patient is a 67-year-old  female who has been having intermittent retrosternal/left-sided chest tightness which is moderate in intensity and which can last from half an hour to 2 hours. Associated with mild dyspnea. Occurs with rest or exercise but usually when she is 'stressed' the symptoms occur more often. No swelling lower extremities.   No prior history of CAD. Family history of CAD. Past Medical History:   Diagnosis Date    Back pain     Borderline personality disorder     Dysthymic disorder     Headache     Insomnia, unspecified     Post traumatic stress disorder (PTSD)     Psychosis     PUD (peptic ulcer disease)     Schizoaffective disorder (HCC)       Past Surgical History:   Procedure Laterality Date    HX CHOLECYSTECTOMY      2012    HX GI  1971    Ulcer surgery of stomach     Family History   Problem Relation Age of Onset    Hypertension Father     Psychiatric Disorder Father      ETOH    Coronary Artery Disease Father     Arthritis-osteo Mother     Thyroid Disease Mother     Stroke Maternal Grandmother     Malignant Hyperthermia Neg Hx     Pseudocholinesterase Deficiency Neg Hx     Delayed Awakening Neg Hx     Post-op Nausea/Vomiting Neg Hx     Emergence Delirium Neg Hx     Post-op Cognitive Dysfunction Neg Hx     Other Neg Hx       Social History   Substance Use Topics    Smoking status: Former Smoker     Packs/day: 1.00     Years: 1.50     Types: Cigarettes    Smokeless tobacco: Never Used    Alcohol use No          Medications before admission:    Current Outpatient Prescriptions   Medication Sig Dispense    escitalopram oxalate (LEXAPRO) 10 mg tablet Take 10 mg by mouth daily. 2 tabs qam     promethazine (PHENERGAN) 25 mg tablet TAKE ONE-HALF TO ONE TABLET BY MOUTH EVERY 6 HOURS AS NEEDED 30 Tab    diazePAM (VALIUM) 10 mg tablet 10 mg two (2) times a day. Indications: anxiety     prazosin (MINIPRESS) 5 mg capsule Take 2 mg by mouth nightly.  SUMAtriptan (IMITREX) 100 mg tablet TAKE ONE TABLET BY MOUTH AT ONSET OF HEADACHE. MAY REPEAT ONE TABLET AFTER 2 HOURS *MAXIMUM OF TWO TABLETS A DAY * 9 Tab    topiramate (TOPAMAX) 50 mg tablet Take 1 Tab by mouth nightly. 30 Tab    buPROPion SR (WELLBUTRIN SR) 150 mg SR tablet Take 150 mg by mouth every morning.      ibuprofen (MOTRIN) 600 mg tablet Take 1 Tab by mouth every eight (8) hours as needed for Pain. (Patient taking differently: Take 800 mg by mouth every eight (8) hours as needed for Pain.) 30 Tab    ziprasidone (GEODON) 40 mg capsule Take 40 mg by mouth two (2) times daily (with meals).  benztropine (COGENTIN) 1 mg tablet Take 1 mg by mouth two (2) times a day.  divalproex DR (DEPAKOTE) 250 mg tablet Take 250 mg by mouth two (2) times a day.  cloZAPine (CLOZARIL) 100 mg tablet Take 200 mg by mouth nightly. 2 tabs @ hs     lithium carbonate 300 mg tablet Take 1 Tab by mouth daily. Indications: mood stabilization (Patient taking differently: Take 300 mg by mouth two (2) times a day. take one twice a day  Indications: mood stabilization) 7 Tab     No current facility-administered medications for this visit. Review of Systems:  (bold if positive, if negative)    Gen:  Weight gainfatigueEyes:  ENT:  CVS:  Pulm:  GI:    :    MS:   arthritisSkin:  Psych:   depression, anxietyEndo:    Hem:  Renal:    Neuro:        Physical Exam:  Visit Vitals    /64 (BP 1 Location: Left arm, BP Patient Position: Sitting)    Pulse 82    Resp 16    Ht 5' 6\" (1.676 m)    Wt 218 lb (98.9 kg)    SpO2 98%    BMI 35.19 kg/m2          Gen: Well-developed, well-nourished, in no acute distress, obese  HEENT:  Pink conjunctivae, hearing intact to voice, moist mucous membranes  Neck: Supple,No JVD, No Carotid Bruit, Thyroid- non tender  Resp: No accessory muscle use, Clear breath sounds, No rales or rhonchi  Card: Regular Rate,Rythm,Normal S1, S2, No murmurs, rubs or gallop. No thrills. Abd:  Soft, non-tender, normoactive bowel sounds are present,   MSK: No cyanosis ng  Skin: No rashes  Neuro: moving all four extremities, no focal deficit, follows commands appropriately  Psych:  Good insight, oriented to person, place and time, alert, Nml Affect  LE: No edema  Vascular: Radial Pulses 2+ and symmetric        EKG: reviewed.    Results for orders placed or performed during the hospital encounter of 01/05/18   EKG, 12 LEAD, INITIAL   Result Value Ref Range    Ventricular Rate 75 BPM    Atrial Rate 75 BPM    P-R Interval 152 ms    QRS Duration 86 ms    Q-T Interval 410 ms    QTC Calculation (Bezet) 457 ms    Calculated P Axis 47 degrees    Calculated R Axis 21 degrees    Calculated T Axis 53 degrees    Diagnosis       Normal sinus rhythm  Nonspecific T wave abnormality  Abnormal ECG  When compared with ECG of 09-JUL-2015 17:17,  No significant change was found  Confirmed by Leif PACHECO, Eusebio ([id number]) on 1/7/2018 6:52:55 PM           Cxray:    LABS:    No results for input(s): CPK, TROIQ in the last 72 hours.     No lab exists for component: CKQMB, CPKMB    Lab Results   Component Value Date/Time    WBC 9.1 01/06/2018 12:45 AM    HGB 12.4 01/06/2018 12:45 AM    HCT 38.2 01/06/2018 12:45 AM    PLATELET 141 75/63/9586 12:45 AM    MCV 95.7 01/06/2018 12:45 AM     Lab Results   Component Value Date/Time    Sodium 140 01/06/2018 12:45 AM    Potassium 4.0 01/06/2018 12:45 AM    Chloride 105 01/06/2018 12:45 AM    CO2 30 01/06/2018 12:45 AM    Anion gap 5 01/06/2018 12:45 AM    Glucose 101 01/06/2018 12:45 AM    Glucose 95 02/11/2015 06:10 AM    BUN 17 01/06/2018 12:45 AM    Creatinine 0.93 01/06/2018 12:45 AM    BUN/Creatinine ratio 18 01/06/2018 12:45 AM    GFR est AA >60 01/06/2018 12:45 AM    GFR est non-AA >60 01/06/2018 12:45 AM    Calcium 9.4 01/06/2018 12:45 AM             Sonja Prasad MD

## 2018-02-01 ENCOUNTER — OFFICE VISIT (OUTPATIENT)
Dept: FAMILY MEDICINE CLINIC | Age: 48
End: 2018-02-01

## 2018-02-01 ENCOUNTER — HOSPITAL ENCOUNTER (OUTPATIENT)
Dept: GENERAL RADIOLOGY | Age: 48
Discharge: HOME | End: 2018-02-01
Attending: NURSE PRACTITIONER
Payer: COMMERCIAL

## 2018-02-01 VITALS
HEART RATE: 82 BPM | WEIGHT: 215 LBS | RESPIRATION RATE: 18 BRPM | OXYGEN SATURATION: 98 % | BODY MASS INDEX: 34.55 KG/M2 | DIASTOLIC BLOOD PRESSURE: 63 MMHG | SYSTOLIC BLOOD PRESSURE: 113 MMHG | TEMPERATURE: 98.2 F | HEIGHT: 66 IN

## 2018-02-01 DIAGNOSIS — M79.671 RIGHT FOOT PAIN: ICD-10-CM

## 2018-02-01 DIAGNOSIS — M79.671 RIGHT FOOT PAIN: Primary | ICD-10-CM

## 2018-02-01 PROCEDURE — 73630 X-RAY EXAM OF FOOT: CPT

## 2018-02-01 RX ORDER — ESCITALOPRAM OXALATE 20 MG/1
TABLET ORAL
COMMUNITY
Start: 2018-01-24 | End: 2018-08-28 | Stop reason: ALTCHOICE

## 2018-02-01 RX ORDER — BENZTROPINE MESYLATE 0.5 MG/1
TABLET ORAL
COMMUNITY
Start: 2018-01-24 | End: 2018-03-12

## 2018-02-01 RX ORDER — PRAZOSIN HYDROCHLORIDE 2 MG/1
CAPSULE ORAL
COMMUNITY
Start: 2018-01-12 | End: 2018-04-09 | Stop reason: SDUPTHER

## 2018-02-01 NOTE — PROGRESS NOTES
HISTORY OF PRESENT ILLNESS  Mariana Conklin is a 50 y.o. female. HPI  Pt presents with \"foot pain\"    Pt states that she shinks she broke a bone in her right foot. Pt states that she was walking barefoot in her driveway, and slipped on rocks, and since then has had a lot of pain on the bottom of her right foot. She has been able to walk on the foot, but it causes a lot of pain. Swelling and some mild bruising is noted in the foot. She has been taking Tylenol, with no improvement. Review of Systems   Constitutional: Negative for fever. HENT: Negative for congestion. Respiratory: Negative for cough. Gastrointestinal: Negative for diarrhea and vomiting. Musculoskeletal: Positive for joint pain. Physical Exam   Constitutional: She is oriented to person, place, and time. She appears well-developed and well-nourished. HENT:   Head: Normocephalic and atraumatic. Cardiovascular: Normal rate, regular rhythm and normal heart sounds. Pulmonary/Chest: Effort normal and breath sounds normal.   Musculoskeletal:        Right ankle: She exhibits normal range of motion, no swelling and normal pulse. Left foot: There is tenderness, bony tenderness and swelling. There is normal range of motion. Neurological: She is alert and oriented to person, place, and time. Skin: Skin is warm and dry. Psychiatric: She has a normal mood and affect. Her behavior is normal.       ASSESSMENT and PLAN    ICD-10-CM ICD-9-CM    1. Right foot pain M79.671 729.5 XR FOOT RT MIN 3 V     Informed patient that we will notify her when the x-ray returns, and inform her of any change in plan of care at that time. Educated about RICE and taking Ibuprofen as needed for pain. Pt informed to return to office with worsening of symptoms, or PRN with any questions or concerns. Pt verbalizes understanding of plan of care and denies further questions or concerns at this time.

## 2018-02-01 NOTE — MR AVS SNAPSHOT
[address] [facility] 
[phone] Patient: Lakisha Block MRN: [id number] :1970 Visit Information Date & Time Provider Department Dept. Phone Encounter #  
 2018  2:30 PM Feliz Bamberger,  [address] [phone] [id number] Follow-up Instructions Return if symptoms worsen or fail to improve. Your Appointments 2018 11:40 AM  
Follow Up with Jyoti Fletcher MD  
[facility]) Appt Note: 3 month f/u migraine [place] [address] [facility] [address] [facility] 99 [id number] [phone]  
  
   
 [address] [facility] [address]  
  
    
 2018  1:00 PM  
NUCLEAR MEDICINE with NUCLEARLORENZO  
[facility] ([facility]) Appt Note: 2 day stress test nuc dx cp ht 5 6 wt 218 at 3 echo per dr p  
 [address] [place] [address]  
[phone]  
  
   
 [address] [facility] [address] [place] [address]  
  
    
 2018  3:00 PM  
ECHO CARDIOGRAMS 2D with ECHOLORENZO  
[facility] ([address]) Appt Note: 2 day stress test nuc dx cp ht 5 6 wt 218 at 3 echo per dr p  
 [address] [place] [address] [facility]  
[phone]  
  
   
 [address] [place] [address]  
  
    
 3/22/2018  1:00 PM  
ESTABLISHED PATIENT with Femi Madden MD  
[facility] ([address]) Appt Note: fup to  [place] [address] [facility]  
[address] [place] [address] [address] Upcoming Health Maintenance Date Due Pneumococcal 19-64 Highest Risk (3 of 3 - PCV13) 2018 PAP AKA CERVICAL CYTOLOGY 2020 DTaP/Tdap/Td series (2 - Td) 6/23/2027 Allergies as of 2/1/2018  Review Complete On: 2/1/2018 By: Feliz Bamberger, NP Severity Noted Reaction Type Reactions Penicillin G High 10/01/2012   Systemic Anaphylaxis Aspirin  10/01/2012   Side Effect Nausea and Vomiting Pt states she isn't allergic to ASA she just can't take  due to gastric ulcers. Current Immunizations  Reviewed on 3/17/2015 Name Date Influenza Vaccine PF 3/16/2015  5:12 AM  
 Pneumococcal Polysaccharide (PPSV-23) 3/16/2015  5:15 AM  
  
 Not reviewed this visit You Were Diagnosed With   
  
 Codes Comments Right foot pain    -  Primary ICD-10-CM: P28.219 ICD-9-CM: 729.5 Vitals BP Pulse Temp Resp Height(growth percentile) Weight(growth percentile) 113/63 82 98.2 °F (36.8 °C) (Oral) 18 5' 6\" (1.676 m) 215 lb (97.5 kg) LMP SpO2 BMI OB Status Smoking Status 01/29/2018 98% 34.7 kg/m2 Having regular periods Former Smoker BMI and BSA Data Body Mass Index Body Surface Area 34.7 kg/m 2 2.13 m 2 Preferred Pharmacy Pharmacy Name Phone [facility] [id number] - Niall Stroud, [address] [place] [address] [phone] Your Updated Medication List  
  
   
This list is accurate as of: 2/1/18  2:48 PM.  Always use your most recent med list.  
  
  
  
  
 * benztropine 1 mg tablet Commonly known as:  COGENTIN Take 1 mg by mouth two (2) times a day. * benztropine 0.5 mg tablet Commonly known as:  COGENTIN  
  
 buPROPion  mg SR tablet Commonly known as:  Lara Cowden Take 150 mg by mouth every morning. cloZAPine 100 mg tablet Commonly known as:  CLOZARIL Take 200 mg by mouth nightly. 2 tabs @ hs  
  
 diazePAM 10 mg tablet Commonly known as:  VALIUM  
10 mg two (2) times a day. Indications: anxiety  
  
 divalproex  mg tablet Commonly known as:  DEPAKOTE Take 250 mg by mouth two (2) times a day. * escitalopram oxalate 10 mg tablet Commonly known as:  Esha [place] Take 10 mg by mouth daily. 2 tabs qam  
  
 * escitalopram oxalate 20 mg tablet Commonly known as:  LEXAPRO  
  
 ibuprofen 600 mg tablet Commonly known as:  MOTRIN Take 1 Tab by mouth every eight (8) hours as needed for Pain.  
  
 lithium carbonate 300 mg tablet Take 1 Tab by mouth daily. Indications: mood stabilization * prazosin 5 mg capsule Commonly known as:  MINIPRESS Take 2 mg by mouth nightly. * prazosin 2 mg capsule Commonly known as:  MINIPRESS  
  
 promethazine 25 mg tablet Commonly known as:  PHENERGAN  
TAKE ONE-HALF TO ONE TABLET BY MOUTH EVERY 6 HOURS AS NEEDED  
  
 SUMAtriptan 100 mg tablet Commonly known as:  IMITREX  
TAKE ONE TABLET BY MOUTH AT ONSET OF HEADACHE. MAY REPEAT ONE TABLET AFTER 2 HOURS *MAXIMUM OF TWO TABLETS A DAY *  
  
 topiramate 50 mg tablet Commonly known as:  TOPAMAX Take 1 Tab by mouth nightly. ziprasidone 40 mg capsule Commonly known as:  Hayden Danger Take 40 mg by mouth two (2) times daily (with meals). * Notice: This list has 6 medication(s) that are the same as other medications prescribed for you. Read the directions carefully, and ask your doctor or other care provider to review them with you. Follow-up Instructions Return if symptoms worsen or fail to improve. To-Do List   
 02/02/2018 Imaging:  XR FOOT RT MIN 3 V Patient Instructions Foot Pain: Care Instructions Your Care Instructions Foot injuries that cause pain and swelling are fairly common. Almost all sports or home repair projects can cause a misstep that ends up as foot pain. Normal wear and tear, especially as you get older, also can cause foot pain. Most minor foot injuries will heal on their own, and home treatment is usually all you need to do. If you have a severe injury, you may need tests and treatment. Follow-up care is a key part of your treatment and safety.  Be sure to make and go to all appointments, and call your doctor if you are having problems. It's also a good idea to know your test results and keep a list of the medicines you take. How can you care for yourself at home? · Take pain medicines exactly as directed. ¨ If the doctor gave you a prescription medicine for pain, take it as prescribed. ¨ If you are not taking a prescription pain medicine, ask your doctor if you can take an over-the-counter medicine. · Rest and protect your foot. Take a break from any activity that may cause pain. · Put ice or a cold pack on your foot for 10 to 20 minutes at a time. Put a thin cloth between the ice and your skin. · Prop up the sore foot on a pillow when you ice it or anytime you sit or lie down during the next 3 days. Try to keep it above the level of your heart. This will help reduce swelling. · Your doctor may recommend that you wrap your foot with an elastic bandage. Keep your foot wrapped for as long as your doctor advises. · If your doctor recommends crutches, use them as directed. · Wear roomy footwear. · As soon as pain and swelling end, begin gentle exercises of your foot. Your doctor can tell you which exercises will help. When should you call for help? Call 911 anytime you think you may need emergency care. For example, call if: 
? · Your foot turns pale, white, blue, or cold. ?Call your doctor now or seek immediate medical care if: 
? · You cannot move or stand on your foot. ? · Your foot looks twisted or out of its normal position. ? · Your foot is not stable when you step down. ? · You have signs of infection, such as: 
¨ Increased pain, swelling, warmth, or redness. ¨ Red streaks leading from the sore area. ¨ Pus draining from a place on your foot. ¨ A fever. ? · Your foot is numb or tingly. ? Watch closely for changes in your health, and be sure to contact your doctor if: 
? · You do not get better as expected. ? · You have bruises from an injury that last longer than 2 weeks. Where can you learn more? Go to http://daisy-millicent.info/. Enter Z515 in the search box to learn more about \"Foot Pain: Care Instructions. \" Current as of: March 21, 2017 Content Version: 11.4 © 0159-4119 DDRdrive. Care instructions adapted under license by [employer] (which disclaims liability or warranty for this information). If you have questions about a medical condition or this instruction, always ask your healthcare professional. [address] Miguel[address] any warranty or liability for your use of this information. Introducing [facility]! Dear Luis Masters: Thank you for requesting a itzbig account. Our records indicate that you already have an active itzbig account. You can access your account anytime at [email or web address] Technologies Did you know that you can access your hospital and ER discharge instructions at any time in itzbig? You can also review all of your test results from your hospital stay or ER visit. Additional Information If you have questions, please visit the Frequently Asked Questions section of the itzbig website at [email or web address]/. Remember, itzbig is NOT to be used for urgent needs. For medical emergencies, dial 911. Now available from your iPhone and Android! Please provide this summary of care documentation to your next provider. Your primary care clinician is listed as Pedro Flores. If you have any questions after today's visit, please call [phone].

## 2018-02-01 NOTE — PROGRESS NOTES
Chief Complaint   Patient presents with    Foot Pain     pt states she is having right heel pain that radiates up her leg, into her hip, and lower back. hurt her foot 2 days ago stepping on rocks. \"REVIEWED RECORD IN PREPARATION FOR VISIT AND HAVE OBTAINED THE NECESSARY DOCUMENTATION\"  1. Have you been to the ER, urgent care clinic since your last visit? Hospitalized since your last visit? No    2. Have you seen or consulted any other health care providers outside of the [address] [place] [address] since your last visit? Include any pap smears or colon screening. No  Patient does not have advanced directives.

## 2018-02-02 ENCOUNTER — TELEPHONE (OUTPATIENT)
Dept: FAMILY MEDICINE CLINIC | Age: 48
End: 2018-02-02

## 2018-02-02 NOTE — PROGRESS NOTES
Please call patient and let her know that her x-ray returned and is normal.  This is reassuring. Should continue anti-inflammatory medication, and RICE. Thanks!

## 2018-02-02 NOTE — TELEPHONE ENCOUNTER
----- Message from Ruiz Slater NP sent at 2/2/2018  8:05 AM EST -----  Please call patient and let her know that her x-ray returned and is normal.  This is reassuring. Should continue anti-inflammatory medication, and RICE. Thanks!

## 2018-02-07 RX ORDER — PROMETHAZINE HYDROCHLORIDE 25 MG/1
TABLET ORAL
Qty: 30 TAB | Refills: 0 | Status: SHIPPED | OUTPATIENT
Start: 2018-02-07 | End: 2018-02-27 | Stop reason: SDUPTHER

## 2018-02-27 ENCOUNTER — OFFICE VISIT (OUTPATIENT)
Dept: NEUROLOGY | Age: 48
End: 2018-02-27

## 2018-02-27 VITALS
WEIGHT: 219 LBS | DIASTOLIC BLOOD PRESSURE: 78 MMHG | OXYGEN SATURATION: 98 % | BODY MASS INDEX: 35.35 KG/M2 | SYSTOLIC BLOOD PRESSURE: 112 MMHG | HEART RATE: 85 BPM

## 2018-02-27 DIAGNOSIS — G43.909 MIGRAINE WITHOUT STATUS MIGRAINOSUS, NOT INTRACTABLE, UNSPECIFIED MIGRAINE TYPE: ICD-10-CM

## 2018-02-27 RX ORDER — PROMETHAZINE HYDROCHLORIDE 25 MG/1
TABLET ORAL
Qty: 30 TAB | Refills: 3 | Status: SHIPPED | OUTPATIENT
Start: 2018-02-27 | End: 2018-07-09 | Stop reason: SDUPTHER

## 2018-02-27 RX ORDER — SUMATRIPTAN 100 MG/1
TABLET, FILM COATED ORAL
Qty: 9 TAB | Refills: 5 | Status: SHIPPED | OUTPATIENT
Start: 2018-02-27 | End: 2018-08-03 | Stop reason: SDUPTHER

## 2018-02-27 NOTE — PATIENT INSTRUCTIONS
[facility]   Statement to Patients  April 1, 2014      In an effort to ensure the large volume of patient prescription refills is processed in the most efficient and expeditious manner, we are asking our patients to assist us by calling your Pharmacy for all prescription refills, this will include also your  Mail Order Pharmacy. The pharmacy will contact our office electronically to continue the refill process. Please do not wait until the last minute to call your pharmacy. We need at least 48 hours (2days) to fill prescriptions. We also encourage you to call your pharmacy before going to  your prescription to make sure it is ready. With regard to controlled substance prescription refill requests (narcotic refills) that need to be picked up at our office, we ask your cooperation by providing us with at least 72 hours (3days) notice that you will need a refill. We will not refill narcotic prescription refill requests after 4:00pm on any weekday, Monday through Thursday, or after 2:00pm on Fridays, or on the weekends. We encourage everyone to explore another way of getting your prescription refill request processed using Fan Pier, our patient web portal through our electronic medical record system. Fan Pier is an efficient and effective way to communicate your medication request directly to the office and  downloadable as an sam on your smart phone . Fan Pier also features a review functionality that allows you to view your medication list as well as leave messages for your physician. Are you ready to get connected? If so please review the attatched instructions or speak to any of our staff to get you set up right away! Thank you so much for your cooperation. Should you have any questions please contact our Practice Administrator.     The Physicians and Staff,  [facility]

## 2018-02-27 NOTE — MR AVS SNAPSHOT
[facility] [address] [place] [address] [phone] Patient: Kendall Richardson MRN: [id number] :1970 Visit Information Date & Time Provider Department Dept. Phone Encounter #  
 2018 11:40 AM Michelle Ruiz MD [facility] [phone] [id number] Follow-up Instructions Return in about 6 months (around 2018). Your Appointments 2018 11:40 AM  
Follow Up with Michelle Ruiz MD  
[facility]) Appt Note: 3 month f/u migraine [place]  [address] [place] [address] [phone]  
  
   
 [address] [facility] [address]  
  
    
 3/22/2018  1:00 PM  
ESTABLISHED PATIENT with Ghanshyam Conrad MD  
[facility] ([facility]) Appt Note: fup to  [place] [address] [facility]  
[address] [facility] [place] [address] [address] Upcoming Health Maintenance Date Due Pneumococcal 19-64 Highest Risk (3 of 3 - PCV13) 2018 PAP AKA CERVICAL CYTOLOGY 2020 DTaP/Tdap/Td series (2 - Td) 2027 Allergies as of 2018  Review Complete On: 2018 By: Lilly Flowers LPN Severity Noted Reaction Type Reactions Penicillin G High 10/01/2012   Systemic Anaphylaxis Aspirin  10/01/2012   Side Effect Nausea and Vomiting Pt states she isn't allergic to ASA she just can't take  due to gastric ulcers. Current Immunizations  Reviewed on 3/17/2015 Name Date Influenza Vaccine PF 3/16/2015  5:12 AM  
 Pneumococcal Polysaccharide (PPSV-23) 3/16/2015  5:15 AM  
  
 Not reviewed this visit You Were Diagnosed With   
  
 Codes Comments  Migraine without status migrainosus, not intractable, unspecified migraine type     ICD-10-CM: O26.348 ICD-9-CM: 346.90 Vitals BP Pulse Weight(growth percentile) LMP SpO2 BMI  
 112/78 85 219 lb (99.3 kg) 01/29/2018 98% 35.35 kg/m2 OB Status Smoking Status Having regular periods Former Smoker BMI and BSA Data Body Mass Index Body Surface Area  
 35.35 kg/m 2 2.15 m 2 Preferred Pharmacy Pharmacy Name Phone [address], [address] [place] [address] [phone] Your Updated Medication List  
  
   
This list is accurate as of 2/27/18 11:00 AM.  Always use your most recent med list.  
  
  
  
  
 * benztropine 1 mg tablet Commonly known as:  COGENTIN Take 1 mg by mouth two (2) times a day. * benztropine 0.5 mg tablet Commonly known as:  COGENTIN  
  
 buPROPion  mg SR tablet Commonly known as:  Kenton Rouleau Take 150 mg by mouth every morning. cloZAPine 100 mg tablet Commonly known as:  CLOZARIL Take 200 mg by mouth nightly. 2 tabs @ hs  
  
 diazePAM 10 mg tablet Commonly known as:  VALIUM  
10 mg two (2) times a day. Indications: anxiety  
  
 divalproex  mg tablet Commonly known as:  DEPAKOTE Take 250 mg by mouth two (2) times a day. * escitalopram oxalate 10 mg tablet Commonly known as:  [place]  Take 10 mg by mouth daily. 2 tabs qam  
  
 * escitalopram oxalate 20 mg tablet Commonly known as:  LEXAPRO  
  
 ibuprofen 600 mg tablet Commonly known as:  MOTRIN Take 1 Tab by mouth every eight (8) hours as needed for Pain.  
  
 lithium carbonate 300 mg tablet Take 1 Tab by mouth daily. Indications: mood stabilization * prazosin 5 mg capsule Commonly known as:  MINIPRESS Take 2 mg by mouth nightly. * prazosin 2 mg capsule Commonly known as:  MINIPRESS  
  
 promethazine 25 mg tablet Commonly known as:  PHENERGAN  
TAKE ONE-HALF TO ONE TABLET BY MOUTH EVERY 6 HOURS AS NEEDED  
  
 SUMAtriptan 100 mg tablet Commonly known as:  IMITREX  
TAKE ONE TABLET BY MOUTH AT ONSET OF HEADACHE. MAY REPEAT ONE TABLET AFTER 2 HOURS *MAXIMUM OF TWO TABLETS A DAY *  
  
 topiramate 50 mg tablet Commonly known as:  TOPAMAX Take 1 Tab by mouth nightly. ziprasidone 40 mg capsule Commonly known as:  Payton Claw Take 40 mg by mouth two (2) times daily (with meals). * Notice: This list has 6 medication(s) that are the same as other medications prescribed for you. Read the directions carefully, and ask your doctor or other care provider to review them with you. Prescriptions Sent to Pharmacy Refills  
 promethazine (PHENERGAN) 25 mg tablet 3 Sig: TAKE ONE-HALF TO ONE TABLET BY MOUTH EVERY 6 HOURS AS NEEDED Class: Normal  
 Pharmacy: [address] [place] [address]  Ph #: [phone] SUMAtriptan (IMITREX) 100 mg tablet 5 Sig: TAKE ONE TABLET BY MOUTH AT ONSET OF HEADACHE. MAY REPEAT ONE TABLET AFTER 2 HOURS *MAXIMUM OF TWO TABLETS A DAY * Class: Normal  
 Pharmacy: [address] [place] [address]  Ph #: [phone] Follow-up Instructions Return in about 6 months (around 8/27/2018). Patient Instructions PRESCRIPTION REFILL POLICY [facility] Statement to Patients April 1, 2014 In an effort to ensure the large volume of patient prescription refills is processed in the most efficient and expeditious manner, we are asking our patients to assist us by calling your Pharmacy for all prescription refills, this will include also your  Mail Order Pharmacy. The pharmacy will contact our office electronically to continue the refill process. Please do not wait until the last minute to call your pharmacy. We need at least 48 hours (2days) to fill prescriptions. We also encourage you to call your pharmacy before going to  your prescription to make sure it is ready. With regard to controlled substance prescription refill requests (narcotic refills) that need to be picked up at our office, we ask your cooperation by providing us with at least 72 hours (3days) notice that you will need a refill. We will not refill narcotic prescription refill requests after 4:00pm on any weekday, Monday through Thursday, or after 2:00pm on Fridays, or on the weekends. We encourage everyone to explore another way of getting your prescription refill request processed using BaseTrace, our patient web portal through our electronic medical record system. BaseTrace is an efficient and effective way to communicate your medication request directly to the office and  downloadable as an sam on your smart phone . BaseTrace also features a review functionality that allows you to view your medication list as well as leave messages for your physician. Are you ready to get connected? If so please review the attatched instructions or speak to any of our staff to get you set up right away! Thank you so much for your cooperation. Should you have any questions please contact our Practice Administrator. The Physicians and Staff,  [facility]! Dear Homa Philip: Thank you for requesting a BaseTrace account. Our records indicate that you already have an active BaseTrace account. You can access your account anytime at [email or web address] or Skip. DVS [email or web address]/Tip or Skip Did you know that you can access your hospital and ER discharge instructions at any time in BaseTrace? You can also review all of your test results from your hospital stay or ER visit. Additional Information If you have questions, please visit the Frequently Asked Questions section of the BaseTrace website at [email or web address] or Skip. DVS [email or web address]/Complete [email or web address]/. Remember, BaseTrace is NOT to be used for urgent needs. For medical emergencies, dial 911. Now available from your iPhone and Android! Please provide this summary of care documentation to your next provider. Your primary care clinician is listed as Car Fowler. If you have any questions after today's visit, please call [phone].

## 2018-02-27 NOTE — PROGRESS NOTES
Neurology Progress Note    Patient ID:  Ashtyn Spivey  [id number]  37 y.o.  1970      Subjective:   History:  Ole Kelly is a 50 y.o. female who  has a past medical history of Back pain; Borderline personality disorder; Dysthymic disorder; Headache; Insomnia, PTSD, PUD (peptic ulcer disease); and Schizoaffective disorder (HCC). who for the past 2-3 months, noted severe headache, R periorbital radiating to R temple and frontal area, 3-4/ week, lasting 4 hrs, stress seem to be a trigger, Imitrex works, has nausea, photophobia and phonophobia. Consideration include migraine, without visual auras, intractable which may be related to perimenopause and stress due to recent passing of father from a pedestrian accident. Patient also noted cognitive issues for 5 months described as jumbling up words concerning for mild cognitive impairment probably due to her psych medication and again stress from her dad's passing. Propranolol caused SOB and diarrhea.      Patient ran out of Topamax 1 month ago with note of headaches 2/ week. Imitrex and Phenergan still work. 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  Indications: anxiety      topiramate (TOPAMAX) 50 mg tablet Take 1 Tab by mouth nightly. 30 Tab 5    buPROPion SR (WELLBUTRIN SR) 150 mg SR tablet Take 150 mg by mouth every morning.  ibuprofen (MOTRIN) 600 mg tablet Take 1 Tab by mouth every eight (8) hours as needed for Pain. (Patient taking differently: Take 800 mg by mouth every eight (8) hours as needed for Pain.) 30 Tab 0    ziprasidone (GEODON) 40 mg capsule Take 40 mg by mouth two (2) times daily (with meals).  benztropine (COGENTIN) 1 mg tablet Take 1 mg by mouth two (2) times a day.  divalproex DR (DEPAKOTE) 250 mg tablet Take 250 mg by mouth two (2) times a day.  cloZAPine (CLOZARIL) 100 mg tablet Take 200 mg by mouth nightly. 2 tabs @ hs      lithium carbonate 300 mg tablet Take 1 Tab by mouth daily. Indications: mood stabilization (Patient taking differently: Take 300 mg by mouth two (2) times a day. take one twice a day  Indications: mood stabilization) 7 Tab 0    benztropine (COGENTIN) 0.5 mg tablet       escitalopram oxalate (LEXAPRO) 10 mg tablet Take 10 mg by mouth daily. 2 tabs qam      prazosin (MINIPRESS) 5 mg capsule Take 2 mg by mouth nightly. No current facility-administered medications on file prior to visit. Imaging:    CT Results (recent):    Results from Hospital Encounter encounter on 06/29/17   CT HEAD WO CONT   Narrative EXAM:  CT HEAD WO CONT    INDICATION:   Headache, chronic, new features or neuro deficit    COMPARISON: 2010. TECHNIQUE: Unenhanced CT of the head was performed using 5 mm images. Brain and  bone windows were generated. CT dose reduction was achieved through use of a  standardized protocol tailored for this examination and automatic exposure  control for dose modulation. Adaptive statistical iterative reconstruction  (ASIR) was utilized. FINDINGS:  There is no extra-axial fluid collection hemorrhage shift or masses her. Ventricles are normal in size. Impression impression: Negative. MRI Results (recent):   No results found for this or any previous visit. IR Results (recent):  No results found for this or any previous visit. VAS/US Results (recent):  No results found for this or any previous visit. Reviewed records in [facility] [employer] tab today    Lab Review     Admission on 01/05/2018, Discharged on 01/06/2018   Component Date Value Ref Range Status    WBC 01/06/2018 9.1  3.6 - 11.0 K/uL Final    RBC 01/06/2018 3.99  3.80 - 5.20 M/uL Final    HGB 01/06/2018 12.4  11.5 - 16.0 g/dL Final    HCT 01/06/2018 38.2  35.0 - 47.0 % Final    MCV 01/06/2018 95.7  80.0 - 99.0 FL Final    MCH 01/06/2018 31.1  26.0 - 34.0 PG Final    MCHC 01/06/2018 32.5  30.0 - 36.5 g/dL Final    RDW 01/06/2018 12.8  11.5 - 14.5 % Final    PLATELET 84/18/8159 228  150 - 400 K/uL Final    NEUTROPHILS 01/06/2018 64  32 - 75 % Final    LYMPHOCYTES 01/06/2018 30  12 - 49 % Final    MONOCYTES 01/06/2018 6  5 - 13 % Final    EOSINOPHILS 01/06/2018 0  0 - 7 % Final    BASOPHILS 01/06/2018 0  0 - 1 % Final    ABS. NEUTROPHILS 01/06/2018 5.8  1.8 - 8.0 K/UL Final    ABS. LYMPHOCYTES 01/06/2018 2.7  0.8 - 3.5 K/UL Final    ABS. MONOCYTES 01/06/2018 0.5  0.0 - 1.0 K/UL Final    ABS. EOSINOPHILS 01/06/2018 0.0  0.0 - 0.4 K/UL Final    ABS.  BASOPHILS 01/06/2018 0.0  0.0 - 0.1 K/UL Final    Sodium 01/06/2018 140  136 - 145 mmol/L Final    Potassium 01/06/2018 4.0  3.5 - 5.1 mmol/L Final    Chloride 01/06/2018 105  97 - 108 mmol/L Final    CO2 01/06/2018 30  21 - 32 mmol/L Final    Anion gap 01/06/2018 5  5 - 15 mmol/L Final    Glucose 01/06/2018 101* 65 - 100 mg/dL Final    BUN 01/06/2018 17  6 - 20 MG/DL Final    Creatinine 01/06/2018 0.93  0.55 - 1.02 MG/DL Final    BUN/Creatinine ratio 01/06/2018 18  12 - 20   Final    GFR est AA 01/06/2018 >60  >60 ml/min/1.73m2 Final    GFR est non-AA 01/06/2018 >60  >60 ml/min/1.73m2 Final    Comment: Estimated GFR is calculated using the IDMS-traceable Modification of Diet in Renal Disease (MDRD) Study equation, reported for both  Americans (GFRAA) and non- Americans (GFRNA), and normalized to 1.73m2 body surface area. The physician must decide which value applies to the patient. The MDRD study equation should only be used in individuals age 25 or older. It has not been validated for the following: pregnant women, patients with serious comorbid conditions, or on certain medications, or persons with extremes of body size, muscle mass, or nutritional status.  Calcium 01/06/2018 9.4  8.5 - 10.1 MG/DL Final    Bilirubin, total 01/06/2018 0.2  0.2 - 1.0 MG/DL Final    ALT (SGPT) 01/06/2018 9* 12 - 78 U/L Final    AST (SGOT) 01/06/2018 14* 15 - 37 U/L Final    Alk. phosphatase 01/06/2018 69  45 - 117 U/L Final    Protein, total 01/06/2018 6.9  6.4 - 8.2 g/dL Final    Albumin 01/06/2018 3.6  3.5 - 5.0 g/dL Final    Globulin 01/06/2018 3.3  2.0 - 4.0 g/dL Final    A-G Ratio 01/06/2018 1.1  1.1 - 2.2   Final    Troponin-I, Qt. 01/06/2018 <0.04  <0.05 ng/mL Final    Comment: The presence of detectable troponin above the reference range indicates myocardial injury which may be due to ischemia, myocarditis, trauma, etc.  Clinical correlation is necessary to establish the significance of this finding. Sequential testing is recommended to determine if the typical rise and fall of cTnI is demonstrated. Note:  Cardiac troponin I has a relatively long half life and may be present well after the CK MB has returned to baseline. The reference range is based on the 99th percentile of the referent population.       Ventricular Rate 01/06/2018 75  BPM Final    Atrial Rate 01/06/2018 75  BPM Final    P-R Interval 01/06/2018 152  ms Final    QRS Duration 01/06/2018 86  ms Final    Q-T Interval 01/06/2018 410  ms Final    QTC Calculation (Bezet) 01/06/2018 457  ms Final    Calculated P Axis 01/06/2018 47  degrees Final    Calculated R Axis 01/06/2018 21  degrees Final    Calculated T Axis 01/06/2018 53  degrees Final    Diagnosis 01/06/2018    Final                    Value:Normal sinus rhythm  Nonspecific T wave abnormality  Abnormal ECG  When compared with ECG of 09-JUL-2015 17:17,  No significant change was found  Confirmed by Peyton Yadav MD [id number]) on 1/7/2018 6:52:55 PM      Troponin-I, Qt. 01/06/2018 <0.04  <0.05 ng/mL Final         Objective:       Exam:  Visit Vitals    /78    Pulse 85    Wt 99.3 kg (219 lb)    SpO2 98%    BMI 35.35 kg/m2     Gen: Awake, alert, follows commands  Appropriate appearance, normal speech. Oriented to all spheres. No visual field defect on confrontation exam.  Full eyes movement, with no nystagmus, no diplopia, no ptosis. Normal gag and swallow. All remaining cranial nerves were normal  Motor function: 5/5 in all extremities  Sensory: intact to LT, PP and JPS  Good FTN and HTS   Gait: Normal    Assessment:       ICD-10-CM ICD-9-CM    1. Migraine without status migrainosus, not intractable, unspecified migraine type G43.909 346.90 promethazine (PHENERGAN) 25 mg tablet      SUMAtriptan (IMITREX) 100 mg tablet           Plan:   1. Discussed putting her back on Topamax, but patient reluctant since she is already taking a lot of prescription meds. Suggest trial of Vit B2 100 mg cap 2 caps BID for migraine prevention  2. Continue Imitrex 100 mg prn to try to abort headaches. 3. Continue Phenergan 25 mg prn for nausea  4. Continue headache diary and went through the list that can trigger headache (stress)     Follow-up Disposition:  Return in about 6 months (around 8/27/2018).           Romina Nicholson MD  Diplomate, American Board of Psychiatry and Neurology  Diplomate, Neuromuscular Medicine  Diplomate, American Board of Electrodiagnostic Medicine

## 2018-03-07 ENCOUNTER — OFFICE VISIT (OUTPATIENT)
Dept: FAMILY MEDICINE CLINIC | Age: 48
End: 2018-03-07

## 2018-03-07 VITALS
TEMPERATURE: 97.1 F | WEIGHT: 226 LBS | OXYGEN SATURATION: 98 % | HEART RATE: 98 BPM | SYSTOLIC BLOOD PRESSURE: 124 MMHG | RESPIRATION RATE: 18 BRPM | BODY MASS INDEX: 36.32 KG/M2 | DIASTOLIC BLOOD PRESSURE: 70 MMHG | HEIGHT: 66 IN

## 2018-03-07 DIAGNOSIS — M54.50 ACUTE MIDLINE LOW BACK PAIN WITHOUT SCIATICA: Primary | ICD-10-CM

## 2018-03-07 RX ORDER — DICLOFENAC SODIUM 75 MG/1
75 TABLET, DELAYED RELEASE ORAL 2 TIMES DAILY
Qty: 30 TAB | Refills: 0 | Status: SHIPPED | OUTPATIENT
Start: 2018-03-07 | End: 2018-03-12

## 2018-03-07 NOTE — PROGRESS NOTES
Chief Complaint   Patient presents with    Rib Pain     pt states her rib muscles tighten up on her    Fall     pt states she fell backwards onto her couch last night and hurt her mid and lower back. \"REVIEWED RECORD IN PREPARATION FOR VISIT AND HAVE OBTAINED THE NECESSARY DOCUMENTATION\"  1. Have you been to the ER, urgent care clinic since your last visit? Hospitalized since your last visit? No    2. Have you seen or consulted any other health care providers outside of the [address] [place] [address] since your last visit? Include any pap smears or colon screening. No  Patient does not have advanced directives.

## 2018-03-07 NOTE — PATIENT INSTRUCTIONS

## 2018-03-07 NOTE — PROGRESS NOTES
HISTORY OF PRESENT ILLNESS  Mayte Vivas is a 50 y.o. female. HPI  Pt presents with \"rib pain\"    Pt states that she is here for 2 things. First off, she feels like the muscles around her ribs will \"tighten up\" about 2-3 times a day. When this  Happens, she feels like she has to wait for the pain to go away before she is able to do anything. It will last around 2-3 minutes when it is present. No chest pain, no shortness of breath. No pain to palpation of the ribs. In addition, last night she tripped, and fell about 4 feet. She landed on the couch on her back, and has bene having some back pain since then. Pt states that she tripped over the rug. She states that the pain is in the middle of her back, and starts in the lower back and goes to middle of the back. She has taken Tylenol, but nothing else. No numbness or tinging in hands or feet. She did not hit her head in the fall. Review of Systems   Constitutional: Negative for fever. HENT: Negative for congestion. Respiratory: Negative for cough. Gastrointestinal: Negative for diarrhea and vomiting. Musculoskeletal: Positive for back pain. Physical Exam   Constitutional: She is oriented to person, place, and time. She appears well-developed and well-nourished. HENT:   Head: Normocephalic and atraumatic. Cardiovascular: Normal rate, regular rhythm and normal heart sounds. Pulmonary/Chest: Effort normal and breath sounds normal.   Musculoskeletal:        Lumbar back: She exhibits pain. She exhibits normal range of motion and no tenderness. Neurological: She is alert and oriented to person, place, and time. Skin: Skin is warm and dry. Psychiatric: She has a normal mood and affect. Her behavior is normal.       ASSESSMENT and PLAN    ICD-10-CM ICD-9-CM    1.  Acute midline low back pain without sciatica M54.5 724.2 diclofenac EC (VOLTAREN) 75 mg EC tablet     Informed patient that we can try an anti-inflammatory medication for her pain. Educated about taking with food, twice a day. Should pain continue within 1-2 weeks, she should notify office for x-rays. Educated about always calling 911 or going to the ER with ANY worsening of symptoms, chest pain, shortness of breath, etc.    Pt informed to return to office with worsening of symptoms, or PRN with any questions or concerns. Pt verbalizes understanding of plan of care and denies further questions or concerns at this time.

## 2018-03-07 NOTE — MR AVS SNAPSHOT
[facility] [place] 
 
 
 [address] 
[phone] Patient: Ashok Delcid MRN: [id number] :1970 Visit Information Date & Time Provider Department Dept. Phone Encounter #  
 3/7/2018  8:15 AM Donnell Cheatham 108 [phone] [id number] Follow-up Instructions Return if symptoms worsen or fail to improve. Your Appointments 3/21/2018  8:30 AM  
NUCLEAR MEDICINE with NUCLEAR, LORENZO  
[facility] ([place] SCHEDULING) Appt Note: 2 day stress test nuc dx cp ht 5 6 wt 218 at 11 echo per  p  
 [address] [facility] [address] [facility] [address]  
[phone]  
  
   
 [address] [place] [address]  
  
    
 3/21/2018 11:00 AM  
ECHO CARDIOGRAMS 2D with ECHOLORENZO  
[facility] ([facility]) Appt Note: 2 day stress test nuc dx cp ht 5 6 wt 218 at 3 echo per  p; 2 day stress test nuc dx cp ht 5 6 wt 218 at 11 echo per  p  
 [address] [facility] [address] [facility]  
[phone]  
  
   
 [address] [address]  
  
    
 2018 11:20 AM  
ESTABLISHED PATIENT with Gino Johnston MD  
[facility] ([facility]) Appt Note: fup to nuc; fup to [address] [facility] [address]  
[phone]  
  
   
 [address] [address]  
  
    
 2018 11:40 AM  
Follow Up with Maribell Farrell MD  
[facility]) Appt Note: 6 month f/u headache [place] [address] [facility] [address] [phone]  
  
   
 [place] [address] [address] [address] Upcoming Health Maintenance  Date Due  
 Pneumococcal 19-64 Highest Risk (3 of 3 - PCV13) 6/23/2018 PAP AKA CERVICAL CYTOLOGY 6/23/2020 DTaP/Tdap/Td series (2 - Td) 6/23/2027 Allergies as of 3/7/2018  Review Complete On: 3/7/2018 By: Esha Lubin NP Severity Noted Reaction Type Reactions Penicillin G High 10/01/2012   Systemic Anaphylaxis Aspirin  10/01/2012   Side Effect Nausea and Vomiting Pt states she isn't allergic to ASA she just can't take  due to gastric ulcers. Current Immunizations  Reviewed on 3/17/2015 Name Date Influenza Vaccine PF 3/16/2015  5:12 AM  
 Pneumococcal Polysaccharide (PPSV-23) 3/16/2015  5:15 AM  
  
 Not reviewed this visit You Were Diagnosed With   
  
 Codes Comments Acute midline low back pain without sciatica    -  Primary ICD-10-CM: M54.5 ICD-9-CM: 724.2 Vitals BP Pulse Temp Resp Height(growth percentile) Weight(growth percentile) 124/70 98 97.1 °F (36.2 °C) (Oral) 18 5' 6\" (1.676 m) 226 lb (102.5 kg) SpO2 BMI OB Status Smoking Status 98% 36.48 kg/m2 Having regular periods Former Smoker BMI and BSA Data Body Mass Index Body Surface Area  
 36.48 kg/m 2 2.18 m 2 Preferred Pharmacy Pharmacy Name Phone [address], [address] [place] [address] [phone] Your Updated Medication List  
  
   
This list is accurate as of 3/7/18  8:21 AM.  Always use your most recent med list.  
  
  
  
  
 * benztropine 1 mg tablet Commonly known as:  COGENTIN Take 1 mg by mouth two (2) times a day. * benztropine 0.5 mg tablet Commonly known as:  COGENTIN  
  
 buPROPion  mg SR tablet Commonly known as:  Classie Bourne Take 150 mg by mouth every morning. cloZAPine 100 mg tablet Commonly known as:  CLOZARIL Take 200 mg by mouth nightly. 2 tabs @ hs  
  
 diazePAM 10 mg tablet Commonly known as:  VALIUM  
10 mg two (2) times a day. Indications: anxiety  
  
 diclofenac EC 75 mg EC tablet Commonly known as:  VOLTAREN Take 1 Tab by mouth two (2) times a day. divalproex  mg tablet Commonly known as:  DEPAKOTE Take 250 mg by mouth two (2) times a day. * escitalopram oxalate 10 mg tablet Commonly known as:  Haider Kotyk Take 10 mg by mouth daily. 2 tabs qam  
  
 * escitalopram oxalate 20 mg tablet Commonly known as:  LEXAPRO  
  
 ibuprofen 600 mg tablet Commonly known as:  MOTRIN Take 1 Tab by mouth every eight (8) hours as needed for Pain.  
  
 lithium carbonate 300 mg tablet Take 1 Tab by mouth daily. Indications: mood stabilization * prazosin 5 mg capsule Commonly known as:  MINIPRESS Take 2 mg by mouth nightly. * prazosin 2 mg capsule Commonly known as:  MINIPRESS  
  
 promethazine 25 mg tablet Commonly known as:  PHENERGAN  
TAKE ONE-HALF TO ONE TABLET BY MOUTH EVERY 6 HOURS AS NEEDED  
  
 SUMAtriptan 100 mg tablet Commonly known as:  IMITREX  
TAKE ONE TABLET BY MOUTH AT ONSET OF HEADACHE. MAY REPEAT ONE TABLET AFTER 2 HOURS *MAXIMUM OF TWO TABLETS A DAY *  
  
 topiramate 50 mg tablet Commonly known as:  TOPAMAX Take 1 Tab by mouth nightly. ziprasidone 40 mg capsule Commonly known as:  Bernida Kayla Take 40 mg by mouth two (2) times daily (with meals). * Notice: This list has 6 medication(s) that are the same as other medications prescribed for you. Read the directions carefully, and ask your doctor or other care provider to review them with you. Prescriptions Sent to Pharmacy Refills  
 diclofenac EC (VOLTAREN) 75 mg EC tablet 0 Sig: Take 1 Tab by mouth two (2) times a day. Class: Normal  
 Pharmacy: [address] [place] [address]  Ph #: [phone] Route: Oral  
  
Follow-up Instructions Return if symptoms worsen or fail to improve. Patient Instructions Back Pain: Care Instructions Your Care Instructions Back pain has many possible causes. It is often related to problems with muscles and ligaments of the back. It may also be related to problems with the nerves, discs, or bones of the back. Moving, lifting, standing, sitting, or sleeping in an awkward way can strain the back. Sometimes you don't notice the injury until later. Arthritis is another common cause of back pain. Although it may hurt a lot, back pain usually improves on its own within several weeks. Most people recover in 12 weeks or less. Using good home treatment and being careful not to stress your back can help you feel better sooner. Follow-up care is a key part of your treatment and safety. Be sure to make and go to all appointments, and call your doctor if you are having problems. It's also a good idea to know your test results and keep a list of the medicines you take. How can you care for yourself at home? · Sit or lie in positions that are most comfortable and reduce your pain. Try one of these positions when you lie down: ¨ Lie on your back with your knees bent and supported by large pillows. ¨ Lie on the floor with your legs on the seat of a sofa or chair. [place] on your side with your knees and hips bent and a pillow between your legs. ¨ Lie on your stomach if it does not make pain worse. · Do not sit up in bed, and avoid soft couches and twisted positions. Bed rest can help relieve pain at first, but it delays healing. Avoid bed rest after the first day of back pain. · Change positions every 30 minutes. If you must sit for long periods of time, take breaks from sitting. Get up and walk around, or lie in a comfortable position. · Try using a heating pad on a low or medium setting for 15 to 20 minutes every 2 or 3 hours. Try a warm shower in place of one session with the heating pad. · You can also try an ice pack for 10 to 15 minutes every 2 to 3 hours. Put a thin cloth between the ice pack and your skin. · Take pain medicines exactly as directed. ¨ If the doctor gave you a prescription medicine for pain, take it as prescribed. ¨ If you are not taking a prescription pain medicine, ask your doctor if you can take an over-the-counter medicine. · Take short walks several times a day. You can start with 5 to 10 minutes, 3 or 4 times a day, and work up to longer walks. Walk on level surfaces and avoid hills and stairs until your back is better. · Return to work and other activities as soon as you can. Continued rest without activity is usually not good for your back. · To prevent future back pain, do exercises to stretch and strengthen your back and stomach. Learn how to use good posture, safe lifting techniques, and proper body mechanics. When should you call for help? Call your doctor now or seek immediate medical care if: 
? · You have new or worsening numbness in your legs. ? · You have new or worsening weakness in your legs. (This could make it hard to stand up.) ? · You lose control of your bladder or bowels. ? Watch closely for changes in your health, and be sure to contact your doctor if: 
? · Your pain gets worse. ? · You are not getting better after 2 weeks. Where can you learn more? Go to http://daisy-millicent.info/. Enter U891 in the search box to learn more about \"Back Pain: Care Instructions. \" Current as of: March 21, 2017 Content Version: 11.4 © 0466-5867 [employer] Care instructions adapted under license by [employer] (which disclaims liability or warranty for this information). If you have questions about a medical condition or this instruction, always ask your healthcare professional. [address] any warranty or liability for your use of this information. Introducing \[facility]\"" & HEALTH SERVICES! Dear Nathan Sy: Thank you for requesting a [employer] account.   Our records indicate that you already have an active [employer] account. You can access your account anytime at [email or web address] Did you know that you can access your hospital and ER discharge instructions at any time in [employer]? You can also review all of your test results from your hospital stay or ER visit. Additional Information If you have questions, please visit the Frequently Asked Questions section of the [employer] website at [email or web address] Remember, [employer] is NOT to be used for urgent needs. For medical emergencies, dial 911. Now available from your iPhone and Android! Please provide this summary of care documentation to your next provider. Your primary care clinician is listed as Esha Garcia. If you have any questions after today's visit, please call [phone].

## 2018-03-12 ENCOUNTER — HOSPITAL ENCOUNTER (EMERGENCY)
Age: 48
Discharge: HOME | End: 2018-03-12
Attending: EMERGENCY MEDICINE
Payer: MEDICAID

## 2018-03-12 VITALS
WEIGHT: 220.46 LBS | OXYGEN SATURATION: 99 % | HEART RATE: 85 BPM | BODY MASS INDEX: 35.43 KG/M2 | HEIGHT: 66 IN | TEMPERATURE: 97.4 F | RESPIRATION RATE: 16 BRPM | SYSTOLIC BLOOD PRESSURE: 125 MMHG | DIASTOLIC BLOOD PRESSURE: 67 MMHG

## 2018-03-12 DIAGNOSIS — M54.50 BILATERAL LOW BACK PAIN WITHOUT SCIATICA, UNSPECIFIED CHRONICITY: Primary | ICD-10-CM
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
## 2018-03-12 LAB
ANION GAP BLD CALC-SCNC: 14 MMOL/L (ref 10–20)
BUN BLD-MCNC: 18 MG/DL (ref 9–20)
CA-I BLD-MCNC: 1.21 MMOL/L (ref 1.12–1.32)
CHLORIDE BLD-SCNC: 101 MMOL/L (ref 98–107)
CO2 BLD-SCNC: 28 MMOL/L (ref 21–32)
CREAT BLD-MCNC: 1.1 MG/DL (ref 0.6–1.3)
GLUCOSE BLD-MCNC: 98 MG/DL (ref 65–100)
HCT VFR BLD CALC: 38 % (ref 35–47)
POTASSIUM BLD-SCNC: 4.5 MMOL/L (ref 3.5–5.1)
SERVICE CMNT-IMP: ABNORMAL
SODIUM BLD-SCNC: 138 MMOL/L (ref 136–145)

## 2018-03-12 PROCEDURE — 99283 EMERGENCY DEPT VISIT LOW MDM: CPT

## 2018-03-12 PROCEDURE — 74011250637 HC RX REV CODE- 250/637: Performed by: NURSE PRACTITIONER

## 2018-03-12 PROCEDURE — 80047 BASIC METABLC PNL IONIZED CA: CPT

## 2018-03-12 RX ORDER — LIDOCAINE 50 MG/G
2 PATCH TOPICAL EVERY 24 HOURS
Status: DISCONTINUED | OUTPATIENT
Start: 2018-03-12 | End: 2018-03-12 | Stop reason: HOSPADM

## 2018-03-12 RX ORDER — METHOCARBAMOL 750 MG/1
750 TABLET, FILM COATED ORAL 4 TIMES DAILY
Qty: 16 TAB | Refills: 0 | Status: SHIPPED | OUTPATIENT
Start: 2018-03-12 | End: 2018-03-16

## 2018-03-12 RX ORDER — KETOROLAC TROMETHAMINE 10 MG/1
10 TABLET, FILM COATED ORAL
Qty: 10 TAB | Refills: 0 | Status: SHIPPED | OUTPATIENT
Start: 2018-03-12 | End: 2018-03-15

## 2018-03-12 RX ORDER — LIDOCAINE 50 MG/G
PATCH TOPICAL
Qty: 12 EACH | Refills: 0 | Status: SHIPPED | OUTPATIENT
Start: 2018-03-12 | End: 2018-08-28 | Stop reason: ALTCHOICE

## 2018-03-12 NOTE — ED PROVIDER NOTES
HPI Comments: Patient is a 68-year-old female with a past medical history significant for migraine, schizoaffective disorder, generalized anxiety and PTSD who comes to the ED today via private vehicle with an escort with complaints of low back pain. Patient states approximately a week and a half ago she tripped on an area rug in her home falling 4-5 feet landing on her couch. Since then she has had bilateral low back pain that is now moving up the back and into the neck. Patient states she was seen on March 7 by her primary care provider Dr. Delia Pope who prescribed her voltaren for her pain. She has had no pain relief with that medication. Patient states she is unable to take steroids to making her tremulous and sweaty. Patient has taken tramolol and Robaxin in the past for her back pain. She denies any fever, chills, nausea, vomiting, diarrhea, constipation, loss of bowel or bladder, chest pain or shortness of breath, pain that radiates down the leg. Patient also states she is up to date normal vaccines including the flu vaccine this year. She has no further complaints at this time. Primary care Guillermo Sargent MD      The history is provided by the patient. No  was used.         Past Medical History:   Diagnosis Date    Back pain     Borderline personality disorder     Dysthymic disorder     Headache     Insomnia, unspecified     Post traumatic stress disorder (PTSD)     Psychosis     PUD (peptic ulcer disease)     Schizoaffective disorder (HCC)        Past Surgical History:   Procedure Laterality Date    HX CHOLECYSTECTOMY      2012    HX GI  1971    Ulcer surgery of stomach         Family History:   Problem Relation Age of Onset    Hypertension Father     Psychiatric Disorder Father      ETOH    Coronary Artery Disease Father     Arthritis-osteo Mother     Thyroid Disease Mother     Stroke Maternal Grandmother     Malignant Hyperthermia Neg Hx     Pseudocholinesterase Deficiency Neg Hx     Delayed Awakening Neg Hx     Post-op Nausea/Vomiting Neg Hx     Emergence Delirium Neg Hx     Post-op Cognitive Dysfunction Neg Hx     Other Neg Hx        Social History     Social History    Marital status: SINGLE     Spouse name: N/A    Number of children: N/A    Years of education: N/A     Occupational History    Not on file. 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 HENT: Negative. Respiratory: Negative for cough, shortness of breath and wheezing. Cardiovascular: Negative for chest pain. Gastrointestinal: Negative for abdominal pain, constipation, diarrhea, nausea and vomiting. Genitourinary: Negative for dysuria and urgency. Musculoskeletal: Positive for back pain. Negative for arthralgias, gait problem and joint swelling. Skin: Negative for color change and rash. Neurological: Negative for dizziness and headaches. Psychiatric/Behavioral: Negative. All other systems reviewed and are negative. Vitals:    03/12/18 1129   BP: 122/60   Pulse: 92   Resp: 16   Temp: 97.4 °F (36.3 °C)   SpO2: 98%   Weight: 100 kg (220 lb 7.4 oz)   Height: 5' 6\" (1.676 m)            Physical Exam   Constitutional: She is oriented to person, place, and time. She appears well-developed and well-nourished. HENT:   Head: Normocephalic and atraumatic. Neck: Trachea normal, normal range of motion and phonation normal. Neck supple. No spinous process tenderness and no muscular tenderness present. No rigidity.  Normal range of motion present. Cardiovascular: Normal rate, regular rhythm, normal heart sounds and intact distal pulses. Pulses:       Dorsalis pedis pulses are 2+ on the right side, and 2+ on the left side. Pulmonary/Chest: Effort normal and breath sounds normal. No respiratory distress. She has no wheezes. She has no rales. She exhibits no tenderness. Abdominal: Soft. Bowel sounds are normal. There is no tenderness. There is no guarding. Musculoskeletal: She exhibits no edema, tenderness or deformity. Lumbar back: She exhibits decreased range of motion and pain. She exhibits no tenderness, no bony tenderness, no swelling, no edema, no deformity, no laceration, no spasm and normal pulse. Back:    NOT reproducible bilateral lumbar pain (per pt report). Difficulty bending over. Straight leg raises and motor, sensory & distal pulses intact. Neurological: She is alert and oriented to person, place, and time. She has normal strength. No sensory deficit. She displays a negative Romberg sign. GCS eye subscore is 4. GCS verbal subscore is 5. GCS motor subscore is 6. Skin: Skin is warm and dry. No erythema. Psychiatric: She has a normal mood and affect. Her behavior is normal. Judgment and thought content normal.   Nursing note and vitals reviewed. [facility]      ED Course     Assessment & Plan:     Orders Placed This Encounter    Hold Sample    POC CHEM8    lidocaine (LIDODERM) 5 % patch 2 Patch     Patient is on lithium. Will ensure back pain is not from elevated creatinine. If creatinine is elevated will add on Lithium level.  reviewed    Discussed with Linda Mcginnis MD,ED Provider    Nora Mcclain NP  03/12/18  12:19 PM    Chem 8 without concern for elevated creatinine. Will DC home with Lidoderm patches, PO tramadol and robaxin. Advised to stop Voltaren and ibuprofen while on tramadol. Alternate ice & heat as tolerated.        Procedures     Review:   02/21/2018 1 01/24/2018 DIAZEPAM 10 MG TABLET 28.0 14 [place] GOM [id number] [facility] (3700) 2  [facility]  02/09/2018 1 01/24/2018 DIAZEPAM 10 MG TABLET 28.0 14 MA GOM [id number] [facility] (3700) 1  [facility]  01/29/2018 1 01/24/2018 DIAZEPAM 10 MG TABLET 28.0 14 [place] GOM [id number] [facility] (3700) 0  [facility]  01/12/2018 2 01/12/2018 DIAZEPAM 10 MG TABLET 28.0 14 MA GOM [id number] [facility] (3700) 0  [facility]  12/11/2017 2 11/15/2017 DIAZEPAM 10 MG TABLET 28.0 14 MA GOM [id number] [facility] (3700) 2  [facility]  11/28/2017 2 11/15/2017 DIAZEPAM 10 MG TABLET 28.0 14 MA GOM [id number] [facility] (3700) 1  [facility]  11/15/2017 2 11/15/2017 DIAZEPAM 10 MG TABLET 28.0 14 MA GOM [id number] [facility] (3700) 0  [facility]  11/06/2017 3 11/06/2017 HYDROCODON-ACETAMINOPH 7.5-325 10.0 2 [facility] WAR [id number] [facility] (8934) 0 37.5 [facility] [place]  11/06/2017 2 10/18/2017 DIAZEPAM 10 MG TABLET 14.0 7 MA GOM [id number] [facility] (3700) 3  [facility]  11/03/2017 3 11/03/2017 HYDROCODON-ACETAMINOPH 7.5-325 10.0 2 [place] PER [id number] [facility] (5774) 0 37.5 [facility] [place]  10/31/2017 2 10/18/2017 DIAZEPAM 10 MG TABLET 14.0 7 MA GOM [id number] [facility] (3700) 2  [facility]    1:16 PM  The patient has been reevaluated. The patient is ready for discharge. The patient's signs, symptoms, diagnosis, and discharge instructions have been discussed and the patient/ family has conveyed their understanding. The patient is to follow up as recommended or return to the ED should their symptoms worsen. Plan has been discussed and the patient is in agreement. LABORATORY TESTS:  Labs Reviewed   POC CHEM8 - Abnormal; Notable for the following:        Result Value    GFRNA, POC 53 (*)     All other components within normal limits   SAMPLES BEING HELD   POC CHEM8       IMAGING RESULTS:  No results found. MEDICATIONS GIVEN:  Medications   lidocaine (LIDODERM) 5 % patch 2 Patch (2 Patches TransDERmal Apply Patch 3/12/18 1227)       IMPRESSION:  1. Bilateral low back pain without sciatica, unspecified chronicity    2.  Fall, subsequent encounter        PLAN:  1. Discharge Medication List as of 3/12/2018  1:05 PM      START taking these medications    Details   lidocaine (LIDODERM) 5 % Apply 2 patches to the affected area on your low back for 12 hours a day and remove for 12 hours a day. Indications: back pain, Print, Disp-12 Each, R-0      methocarbamol (ROBAXIN-750) 750 mg tablet Take 1 Tab by mouth four (4) times daily for 4 days. No Driving or operating machinery while on this medication  Indications: Muscle Spasm, Print, Disp-16 Tab, R-0      ketorolac (TORADOL) 10 mg tablet Take 1 Tab by mouth every six (6) hours as needed for Pain for up to 3 days. Indications: Severe Pain, Print, Disp-10 Tab, R-0         CONTINUE these medications which have NOT CHANGED    Details   escitalopram oxalate (LEXAPRO) 20 mg tablet Historical Med      !! prazosin (MINIPRESS) 2 mg capsule Historical Med      diazePAM (VALIUM) 10 mg tablet 10 mg two (2) times a day. Indications: anxiety, Historical Med      !! prazosin (MINIPRESS) 5 mg capsule Take 2 mg by mouth nightly., Historical Med      buPROPion SR (WELLBUTRIN SR) 150 mg SR tablet Take 150 mg by mouth every morning., Historical Med      ziprasidone (GEODON) 40 mg capsule Take 40 mg by mouth two (2) times daily (with meals). , Historical Med      benztropine (COGENTIN) 1 mg tablet Take 1 mg by mouth two (2) times a day., Historical Med      divalproex DR (DEPAKOTE) 250 mg tablet Take 250 mg by mouth two (2) times a day., Historical Med      cloZAPine (CLOZARIL) 100 mg tablet Take 200 mg by mouth nightly. 2 tabs @ hs, Historical Med      lithium carbonate 300 mg tablet Take 1 Tab by mouth daily. Indications: mood stabilization, Print, Disp-7 Tab, R-0      promethazine (PHENERGAN) 25 mg tablet TAKE ONE-HALF TO ONE TABLET BY MOUTH EVERY 6 HOURS AS NEEDED, Normal, Disp-30 Tab, R-3      SUMAtriptan (IMITREX) 100 mg tablet TAKE ONE TABLET BY MOUTH AT ONSET OF HEADACHE.  MAY REPEAT ONE TABLET AFTER 2 HOURS *MAXIMUM OF TWO TABLETS A DAY *, Normal, Disp-9 Tab, R-5       !! - Potential duplicate medications found. Please discuss with provider. STOP taking these medications       diclofenac EC (VOLTAREN) 75 mg EC tablet Comments:   Reason for Stopping:         ibuprofen (MOTRIN) 600 mg tablet Comments:   Reason for Stoppin.   Follow-up Information     Follow up With Details Comments Contact Info    Natalee Luu MD Schedule an appointment as soon as possible for a visit As needed [address] [place] [address] DEPT  As needed, If symptoms worsen Ronan Barrientos [phone] [facility]    [address] an appointment as soon as possible for a visit if back pain doesn't improve [address] [address]  [phone]        3.      Return to ED for new or worsening symptoms       Elzbieta Alejandro NP

## 2018-03-12 NOTE — DISCHARGE INSTRUCTIONS
Learning About Relief for Back Pain  What is back tension and strain? Back strain happens when you overstretch, or pull, a muscle in your back. You may hurt your back in an accident or when you exercise or lift something. Most back pain will get better with rest and time. You can take care of yourself at home to help your back heal.  What can you do first to relieve back pain? When you first feel back pain, try these steps:  · Walk. Take a short walk (10 to 20 minutes) on a level surface (no slopes, hills, or stairs) every 2 to 3 hours. Walk only distances you can manage without pain, especially leg pain. · Relax. Find a comfortable position for rest. Some people are comfortable on the floor or a medium-firm bed with a small pillow under their head and another under their knees. Some people prefer to lie on their side with a pillow between their knees. Don't stay in one position for too long. · Try heat or ice. Try using a heating pad on a low or medium setting, or take a warm shower, for 15 to 20 minutes every 2 to 3 hours. Or you can buy single-use heat wraps that last up to 8 hours. You can also try an ice pack for 10 to 15 minutes every 2 to 3 hours. You can use an ice pack or a bag of frozen vegetables wrapped in a thin towel. There is not strong evidence that either heat or ice will help, but you can try them to see if they help. You may also want to try switching between heat and cold. · Take pain medicine exactly as directed. ¨ If the doctor gave you a prescription medicine for pain, take it as prescribed. ¨ If you are not taking a prescription pain medicine, ask your doctor if you can take an over-the-counter medicine. What else can you do? · Stretch and exercise. Exercises that increase flexibility may relieve your pain and make it easier for your muscles to keep your spine in a good, neutral position. And don't forget to keep walking. · Do self-massage.  You can use self-massage to unwind after work or school or to energize yourself in the morning. You can easily massage your feet, hands, or neck. Self-massage works best if you are in comfortable clothes and are sitting or lying in a comfortable position. Use oil or lotion to massage bare skin. · Reduce stress. Back pain can lead to a vicious [place]: Distress about the pain tenses the muscles in your back, which in turn causes more pain. Learn how to relax your mind and your muscles to lower your stress. Where can you learn more? Go to http://daisy-millicent.info/. Enter H421 in the search box to learn more about \"Learning About Relief for Back Pain. \"  Current as of: March 21, 2017  Content Version: 11.4  © 5013-4377 [employer]. Care instructions adapted under license by [employer] (which disclaims liability or warranty for this information). If you have questions about a medical condition or this instruction, always ask your healthcare professional. [place] [address] any warranty or liability for your use of this information.

## 2018-03-12 NOTE — ED TRIAGE NOTES
Pt presents to ED with c/o one week hx of back pain after tripping and falling onto couch. Pt states she was seen by her MD and is getting worse. Pt denies any other injuries or other symptoms.

## 2018-03-12 NOTE — Clinical Note
Thank you for allowing us to care for you today. Please follow-up with your Primary Care provider in the next 2-3 days if your symptoms do not improve. Plan for home: Follow-up with primary care if pain doesn't resolve. Toradol 10mg tablet e very 6 hours as needed for pain. Stop ibuprofen and diclofenac while on this medication. Robaxin 750mg every 6-8 hours as needed for muscle spasm Lidoderm patch. Apply & leave in place for 12 hours then remove and leave off for 12 hours, as needed  for pain. OK to purchase 4% over-the-counter patches if the 5% patch is not covered by your insurance. You should use ice or heat for your injury and pain. You may use one or the other or alternate between the two. If you use ice: apply the ic e pack in 20 minute intervals. 20 minutes on then 20 minutes off. Make sure to protect the skin to prevent frost bite. Never apply ice or a plastic bag with ice directly to the skin. If you use heat: Do NOT lay or sleep on a heating pad. You will bur n your skin. Instead, use microwave style heat packs or Thermacare packs. These are safer than traditional heating pads. Monitor your skin to prevent burns.

## 2018-03-12 NOTE — ED NOTES
Patient  discharged with instructions per NP [facility] Britni DIAZ. Instructions reviewed with pt.

## 2018-04-02 ENCOUNTER — TELEPHONE (OUTPATIENT)
Dept: NEUROLOGY | Age: 48
End: 2018-04-02

## 2018-04-02 RX ORDER — TOPIRAMATE 50 MG/1
50 TABLET, FILM COATED ORAL
Qty: 30 TAB | Refills: 4 | Status: SHIPPED | OUTPATIENT
Start: 2018-04-02 | End: 2018-07-16 | Stop reason: SDUPTHER

## 2018-04-02 NOTE — TELEPHONE ENCOUNTER
----- Message from Ermalene Merlin sent at 4/2/2018 12:20 PM EDT -----  Regarding: Dr. Tatyana Santiago   Pt wishes to go back on Topamax because her migraines coming back.  Best contact number is [phone] or [phone]

## 2018-04-02 NOTE — TELEPHONE ENCOUNTER
Order placed for Topamax 50 mg, # 30 tabs, PO, per Verbal Order from Dr. Deepa Cadet on 4/2/2018 due to headache.

## 2018-04-09 ENCOUNTER — OFFICE VISIT (OUTPATIENT)
Dept: FAMILY MEDICINE CLINIC | Age: 48
End: 2018-04-09

## 2018-04-09 VITALS
HEART RATE: 94 BPM | OXYGEN SATURATION: 97 % | WEIGHT: 231 LBS | SYSTOLIC BLOOD PRESSURE: 116 MMHG | BODY MASS INDEX: 37.12 KG/M2 | TEMPERATURE: 98.4 F | HEIGHT: 66 IN | RESPIRATION RATE: 18 BRPM | DIASTOLIC BLOOD PRESSURE: 76 MMHG

## 2018-04-09 DIAGNOSIS — K27.9 PUD (PEPTIC ULCER DISEASE): ICD-10-CM

## 2018-04-09 DIAGNOSIS — K21.9 GASTROESOPHAGEAL REFLUX DISEASE WITHOUT ESOPHAGITIS: Primary | ICD-10-CM

## 2018-04-09 RX ORDER — PANTOPRAZOLE SODIUM 20 MG/1
20 TABLET, DELAYED RELEASE ORAL DAILY
Qty: 30 TAB | Refills: 0 | Status: SHIPPED | OUTPATIENT
Start: 2018-04-09 | End: 2018-08-28 | Stop reason: ALTCHOICE

## 2018-04-09 RX ORDER — SUCRALFATE 1 G/10ML
1 SUSPENSION ORAL 4 TIMES DAILY
Qty: 414 ML | Refills: 0 | Status: SHIPPED | OUTPATIENT
Start: 2018-04-09 | End: 2018-09-24 | Stop reason: SDDI

## 2018-04-09 NOTE — MR AVS SNAPSHOT
[address] [facility] 
[phone] Patient: Nithin Logan MRN: [id number] :1970 Visit Information Date & Time Provider Department Dept. Phone Encounter #  
 2018 11:00 AM Lissette Garnica  [address] [phone] [id number] Follow-up Instructions Return if symptoms worsen or fail to improve. Your Appointments 2018 11:20 AM  
ESTABLISHED PATIENT with Debbie Canchola MD  
[facility] (Brigitte Burr) Appt Note: fup to nuc; fup to nuc; fup to [address] [facility] 600 [facility] [id number] [phone]  
  
   
 [address] [address]  
  
    
 2018 11:40 AM  
Follow Up with Kimmy Paul MD  
[facility]) Appt Note: 6 month f/u headache [place] [address] [facility] [address] [facility] 99 [id number] [phone]  
  
   
 [place]  [facility] [address] [address] [address] Upcoming Health Maintenance Date Due Pneumococcal 19-64 Highest Risk (3 of 3 - PCV13) 2018 PAP AKA CERVICAL CYTOLOGY 2020 DTaP/Tdap/Td series (2 - Td) 2027 Allergies as of 2018  Review Complete On: 2018 By: Lissette Garnica NP Severity Noted Reaction Type Reactions Penicillin G High 10/01/2012   Systemic Anaphylaxis Aspirin  10/01/2012   Side Effect Nausea and Vomiting Pt states she isn't allergic to ASA she just can't take  due to gastric ulcers. Current Immunizations  Reviewed on 3/17/2015 Name Date Influenza Vaccine PF 3/16/2015  5:12 AM  
 Pneumococcal Polysaccharide (PPSV-23) 3/16/2015  5:15 AM  
  
 Not reviewed this visit You Were Diagnosed With   
  
 Codes Comments Gastroesophageal reflux disease without esophagitis    -  Primary ICD-10-CM: K21.9 ICD-9-CM: 530.81   
 PUD (peptic ulcer disease)     ICD-10-CM: K27.9 ICD-9-CM: 533.90 Vitals BP Pulse Temp Resp Height(growth percentile) Weight(growth percentile) 116/76 94 98.4 °F (36.9 °C) (Oral) 18 5' 6\" (1.676 m) 231 lb (104.8 kg) LMP SpO2 BMI OB Status Smoking Status 04/02/2018 97% 37.28 kg/m2 Having regular periods Former Smoker Vitals History BMI and BSA Data Body Mass Index Body Surface Area  
 37.28 kg/m 2 2.21 m 2 Preferred Pharmacy Pharmacy Name Phone [facility] [id number] - [address], [facility] [address] [place] [address] [phone] Your Updated Medication List  
  
   
This list is accurate as of 4/9/18 11:04 AM.  Always use your most recent med list.  
  
  
  
  
 benztropine 1 mg tablet Commonly known as:  COGENTIN Take 1 mg by mouth two (2) times a day. buPROPion  mg SR tablet Commonly known as:  Nile Eveline Take 150 mg by mouth every morning. cloZAPine 100 mg tablet Commonly known as:  CLOZARIL Take 200 mg by mouth nightly. 2 tabs @ hs  
  
 diazePAM 10 mg tablet Commonly known as:  VALIUM  
10 mg two (2) times a day. Indications: anxiety  
  
 divalproex  mg tablet Commonly known as:  DEPAKOTE Take 250 mg by mouth two (2) times a day. escitalopram oxalate 20 mg tablet Commonly known as:  LEXAPRO  
  
 lidocaine 5 % Commonly known as:  Ray Quintero Apply 2 patches to the affected area on your low back for 12 hours a day and remove for 12 hours a day. Indications: back pain  
  
 lithium carbonate 300 mg tablet Take 1 Tab by mouth daily. Indications: mood stabilization  
  
 pantoprazole 20 mg tablet Commonly known as:  PROTONIX Take 1 Tab by mouth daily. prazosin 5 mg capsule Commonly known as:  MINIPRESS Take 2 mg by mouth nightly. promethazine 25 mg tablet Commonly known as:  PHENERGAN  
TAKE ONE-HALF TO ONE TABLET BY MOUTH EVERY 6 HOURS AS NEEDED  
  
 sucralfate 100 mg/mL suspension Commonly known as:  Marybel  Take 10 mL by mouth four (4) times daily. SUMAtriptan 100 mg tablet Commonly known as:  IMITREX  
TAKE ONE TABLET BY MOUTH AT ONSET OF HEADACHE. MAY REPEAT ONE TABLET AFTER 2 HOURS *MAXIMUM OF TWO TABLETS A DAY *  
  
 topiramate 50 mg tablet Commonly known as:  TOPAMAX Take 1 Tab by mouth nightly. ziprasidone 40 mg capsule Commonly known as:  [place] Arvizu Take 40 mg by mouth two (2) times daily (with meals). Prescriptions Sent to Pharmacy Refills  
 sucralfate (CARAFATE) 100 mg/mL suspension 0 Sig: Take 10 mL by mouth four (4) times daily. Class: Normal  
 Pharmacy: [facility] # [address] [place] [address], [address] [place] [address] Ph #: [phone] Route: Oral  
 pantoprazole (PROTONIX) 20 mg tablet 0 Sig: Take 1 Tab by mouth daily. Class: Normal  
 Pharmacy: [facility] # [address] [place] [address], [address] [place] [address] Ph #: [phone] Route: Oral  
  
Follow-up Instructions Return if symptoms worsen or fail to improve. Patient Instructions Gastroesophageal Reflux Disease (GERD): Care Instructions Your Care Instructions Gastroesophageal reflux disease (GERD) is the backward flow of stomach acid into the esophagus. The esophagus is the tube that leads from your throat to your stomach. A one-way valve prevents the stomach acid from moving up into this tube. When you have GERD, this valve does not close tightly enough. If you have mild GERD symptoms including heartburn, you may be able to control the problem with antacids or over-the-counter medicine. Changing your diet, losing weight, and making other lifestyle changes can also help reduce symptoms. Follow-up care is a key part of your treatment and safety. Be sure to make and go to all appointments, and call your doctor if you are having problems.  It's also a good idea to know your test results and keep a list of the medicines you take. How can you care for yourself at home? · Take your medicines exactly as prescribed. Call your doctor if you think you are having a problem with your medicine. · Your doctor may recommend over-the-counter medicine. For mild or occasional indigestion, antacids, such as Tums, Gaviscon, Mylanta, or Maalox, may help. Your doctor also may recommend over-the-counter acid reducers, such as Pepcid AC, Tagamet HB, Zantac 75, or Prilosec. Read and follow all instructions on the label. If you use these medicines often, talk with your doctor. · Change your eating habits. ¨ It's best to eat several small meals instead of two or three large meals. ¨ After you eat, wait 2 to 3 hours before you lie down. ¨ Chocolate, mint, and alcohol can make GERD worse. ¨ Spicy foods, foods that have a lot of acid (like tomatoes and oranges), and coffee can make GERD symptoms worse in some people. If your symptoms are worse after you eat a certain food, you may want to stop eating that food to see if your symptoms get better. · Do not smoke or chew tobacco. Smoking can make GERD worse. If you need help quitting, talk to your doctor about stop-smoking programs and medicines. These can increase your chances of quitting for good. · If you have GERD symptoms at night, raise the head of your bed 6 to 8 inches by putting the frame on blocks or placing a foam wedge under the head of your mattress. (Adding extra pillows does not work.) · Do not wear tight clothing around your middle. · Lose weight if you need to. Losing just 5 to 10 pounds can help. When should you call for help? Call your doctor now or seek immediate medical care if: 
? · You have new or different belly pain. ? · Your stools are black and tarlike or have streaks of blood. ? Watch closely for changes in your health, and be sure to contact your doctor if: 
? · Your symptoms have not improved after 2 days. ? · Food seems to catch in your throat or chest.  
Where can you learn more? Go to http://daisy-millicent.info/. Enter Y331 in the search box to learn more about \"Gastroesophageal Reflux Disease (GERD): Care Instructions. \" Current as of: May 12, 2017 Content Version: 11.4 © 4950-8267 "[employer] Care instructions adapted under license by [employer] (which disclaims liability or warranty for this information). If you have questions about a medical condition or this instruction, always ask your healthcare professional. [address] any warranty or liability for your use of this information. Introducing [facility]! Dear Leno Perez: Thank you for requesting a Newstag account. Our records indicate that you already have an active Newstag account. You can access your account anytime at [email or web address] Did you know that you can access your hospital and ER discharge instructions at any time in Newstag? You can also review all of your test results from your hospital stay or ER visit. Additional Information If you have questions, please visit the Frequently Asked Questions section of the Newstag website at [email or web address] Remember, Newstag is NOT to be used for urgent needs. For medical emergencies, dial 911. Now available from your iPhone and Android! Please provide this summary of care documentation to your next provider. Your primary care clinician is listed as Janice Dye. If you have any questions after today's visit, please call [phone].

## 2018-04-09 NOTE — PROGRESS NOTES
Chief Complaint   Patient presents with    Heartburn     pt states she has acid reflux and is taking Pepto Bismol and Maalox without relief     \"REVIEWED RECORD IN PREPARATION FOR VISIT AND HAVE OBTAINED THE NECESSARY DOCUMENTATION\"  1. Have you been to the ER, urgent care clinic since your last visit? Hospitalized since your last visit? No  Yes seen 3 /12/18 at [facility] for fall and back pain    2. Have you seen or consulted any other health care providers outside of the [address] [place] [address] since your last visit? Include any pap smears or colon screening. No  Patient does not have advanced directives.

## 2018-04-09 NOTE — PROGRESS NOTES
HISTORY OF PRESENT ILLNESS  Augustina Velez [facility]. HPI  Pt presents with \"GERD\"    Pt states that she has acid reflux. Pt has history of GERD and PUD, and was on Carafate in the past which worked quite well for her. Pt states that she is taking Pepto Bismol, with no relief. Pt is having GERD symptoms several times a day, for about a month. Sometimes, she will have regurge into her throat. No abdominal pain. Review of Systems   Constitutional: Negative for fever. HENT: Negative for congestion. Respiratory: Negative for cough. Gastrointestinal: Positive for heartburn. Negative for abdominal pain, diarrhea, nausea and vomiting. Physical Exam   Constitutional: She is oriented to person, place, and time. She appears well-developed and well-nourished. HENT:   Head: Normocephalic and atraumatic. Cardiovascular: Normal rate, regular rhythm and normal heart sounds. Pulmonary/Chest: Effort normal and breath sounds normal.   Abdominal: Soft. Normal appearance and bowel sounds are normal. She exhibits no distension and no mass. There is no tenderness. There is no rebound and no guarding. Neurological: She is alert and oriented to person, place, and time. Skin: Skin is warm and dry. Psychiatric: She has a normal mood and affect. Her behavior is normal.       ASSESSMENT and PLAN    ICD-10-CM ICD-9-CM    1. Gastroesophageal reflux disease without esophagitis K21.9 530.81 sucralfate (CARAFATE) 100 mg/mL suspension      pantoprazole (PROTONIX) 20 mg tablet   2. PUD (peptic ulcer disease) K27.9 533.90 sucralfate (CARAFATE) 100 mg/mL suspension      pantoprazole (PROTONIX) 20 mg tablet     Informed patient that I have sent medication to the pharmacy, and she should take as prescribed. Educated about GERD, and foods to avoid. Educated about differences in medication, and only taking Protonix should symptoms continue after using carafate.     Pt informed to return to office with worsening of symptoms, or PRN with any questions or concerns. Pt verbalizes understanding of plan of care and denies further questions or concerns at this time.

## 2018-04-09 NOTE — PATIENT INSTRUCTIONS

## 2018-04-10 NOTE — TELEPHONE ENCOUNTER
----- Message from Fiorella Hernández sent at 4/9/2018  4:14 PM EDT -----  Regarding: Dr. Vasquez Counter telephone  The pt said she missed a call about moving her appt up. Best contact number is [phone].

## 2018-04-10 NOTE — TELEPHONE ENCOUNTER
TC- patient c/o headaches becoming more frequent.  Patient will f/u with Dr. Ronald Singh on April 12, 2018 at 1:00pm.

## 2018-04-12 ENCOUNTER — OFFICE VISIT (OUTPATIENT)
Dept: NEUROLOGY | Age: 48
End: 2018-04-12

## 2018-04-12 ENCOUNTER — TELEPHONE (OUTPATIENT)
Dept: FAMILY MEDICINE CLINIC | Age: 48
End: 2018-04-12

## 2018-04-12 VITALS
OXYGEN SATURATION: 98 % | DIASTOLIC BLOOD PRESSURE: 70 MMHG | WEIGHT: 227 LBS | SYSTOLIC BLOOD PRESSURE: 110 MMHG | HEART RATE: 73 BPM | BODY MASS INDEX: 36.64 KG/M2

## 2018-04-12 DIAGNOSIS — G43.009 MIGRAINE WITHOUT AURA AND WITHOUT STATUS MIGRAINOSUS, NOT INTRACTABLE: Primary | ICD-10-CM

## 2018-04-12 NOTE — MR AVS SNAPSHOT
[address] [facility] [id number] [phone] Patient: Nithin Logan MRN: [id number] :1970 Visit Information Date & Time Provider Department Dept. Phone Encounter #  
 2018  1:00 PM Kimmy Paul MD [facility] Neurology [facility] [phone] [id number] Follow-up Instructions Return in about 3 months (around 2018). Follow-up and Disposition History Your Appointments 2018 11:20 AM  
ESTABLISHED PATIENT with Debbie Canchola MD  
[facility] (Brigitte Leno) Appt Note: fup to nuc; fup to nuc; fup to [address] [place] 600 1007 [facility]  
[address] [facility] [place] [address] [address] Upcoming Health Maintenance Date Due Pneumococcal 19-64 Highest Risk (3 of 3 - PCV13) 2018 PAP AKA CERVICAL CYTOLOGY 2020 DTaP/Tdap/Td series (2 - Td) 2027 Allergies as of 2018  Review Complete On: 2018 By: Juan Carlos Grace LPN Severity Noted Reaction Type Reactions Penicillin G High 10/01/2012   Systemic Anaphylaxis Aspirin  10/01/2012   Side Effect Nausea and Vomiting Pt states she isn't allergic to ASA she just can't take  due to gastric ulcers. Current Immunizations  Reviewed on 3/17/2015 Name Date Influenza Vaccine PF 3/16/2015  5:12 AM  
 Pneumococcal Polysaccharide (PPSV-23) 3/16/2015  5:15 AM  
  
 Not reviewed this visit You Were Diagnosed With   
  
 Codes Comments Migraine without aura and without status migrainosus, not intractable    -  Primary ICD-10-CM: G43.009 ICD-9-CM: 346.10 Vitals BP Pulse Weight(growth percentile) LMP SpO2 BMI  
 110/70 73 227 lb (103 kg) 2018 98% 36.64 kg/m2 OB Status Smoking Status Having regular periods Former Smoker BMI and BSA Data Body Mass Index Body Surface Area  
 36.64 kg/m 2 2.19 m 2 Preferred Pharmacy Pharmacy Name Phone [id number] Noel Encarnacion [address] [place] [address] [phone] Your Updated Medication List  
  
   
This list is accurate as of 4/12/18  1:29 PM.  Always use your most recent med list.  
  
  
  
  
 benztropine 1 mg tablet Commonly known as:  COGENTIN Take 1 mg by mouth two (2) times a day. buPROPion  mg SR tablet Commonly known as:  Joe Shaper Take 150 mg by mouth every morning. cloZAPine 100 mg tablet Commonly known as:  CLOZARIL Take 200 mg by mouth nightly. 2 tabs @ hs  
  
 diazePAM 10 mg tablet Commonly known as:  VALIUM  
10 mg two (2) times a day. Indications: anxiety  
  
 divalproex  mg tablet Commonly known as:  DEPAKOTE Take 250 mg by mouth two (2) times a day. escitalopram oxalate 20 mg tablet Commonly known as:  LEXAPRO  
  
 lidocaine 5 % Commonly known as:  Marigene Gumaro Apply 2 patches to the affected area on your low back for 12 hours a day and remove for 12 hours a day. Indications: back pain  
  
 lithium carbonate 300 mg tablet Take 1 Tab by mouth daily. Indications: mood stabilization  
  
 pantoprazole 20 mg tablet Commonly known as:  PROTONIX Take 1 Tab by mouth daily. prazosin 5 mg capsule Commonly known as:  MINIPRESS Take 2 mg by mouth nightly. promethazine 25 mg tablet Commonly known as:  PHENERGAN  
TAKE ONE-HALF TO ONE TABLET BY MOUTH EVERY 6 HOURS AS NEEDED  
  
 sucralfate 100 mg/mL suspension Commonly known as:  Deloria Area Take 10 mL by mouth four (4) times daily. SUMAtriptan 100 mg tablet Commonly known as:  IMITREX  
TAKE ONE TABLET BY MOUTH AT ONSET OF HEADACHE. MAY REPEAT ONE TABLET AFTER 2 HOURS *MAXIMUM OF TWO TABLETS A DAY *  
  
 topiramate 50 mg tablet Commonly known as:  TOPAMAX Take 1 Tab by mouth nightly. ziprasidone 40 mg capsule Commonly known as:  Benna Dotty Take 40 mg by mouth two (2) times daily (with meals). Follow-up Instructions Return in about 3 months (around 7/12/2018). Patient Instructions PRESCRIPTION REFILL POLICY [address] [facility] Statement to Patients April 1, 2014 In an effort to ensure the large volume of patient prescription refills is processed in the most efficient and expeditious manner, we are asking our patients to assist us by calling your Pharmacy for all prescription refills, this will include also your  Mail Order Pharmacy. The pharmacy will contact our office electronically to continue the refill process. Please do not wait until the last minute to call your pharmacy. We need at least 48 hours (2days) to fill prescriptions. We also encourage you to call your pharmacy before going to  your prescription to make sure it is ready. With regard to controlled substance prescription refill requests (narcotic refills) that need to be picked up at our office, we ask your cooperation by providing us with at least 72 hours (3days) notice that you will need a refill. We will not refill narcotic prescription refill requests after 4:00pm on any weekday, Monday through Thursday, or after 2:00pm on Fridays, or on the weekends. We encourage everyone to explore another way of getting your prescription refill request processed using [employer], our patient web portal through our electronic medical record system. [employer] is an efficient and effective way to communicate your medication request directly to the office and  downloadable as an sam on your smart phone . [employer] also features a review functionality that allows you to view your medication list as well as leave messages for your physician. Are you ready to get connected? If so please review the attatched instructions or speak to any of our staff to get you set up right away! Thank you so much for your cooperation. Should you have any questions please contact our Practice Administrator. The Physicians and Staff,  [facility] PRESCRIPTION REFILL POLICY [facility] Statement to Patients April 1, 2014 In an effort to ensure the large volume of patient prescription refills is processed in the most efficient and expeditious manner, we are asking our patients to assist us by calling your Pharmacy for all prescription refills, this will include also your  Mail Order Pharmacy. The pharmacy will contact our office electronically to continue the refill process. Please do not wait until the last minute to call your pharmacy. We need at least 48 hours (2days) to fill prescriptions. We also encourage you to call your pharmacy before going to  your prescription to make sure it is ready. With regard to controlled substance prescription refill requests (narcotic refills) that need to be picked up at our office, we ask your cooperation by providing us with at least 72 hours (3days) notice that you will need a refill. We will not refill narcotic prescription refill requests after 4:00pm on any weekday, Monday through Thursday, or after 2:00pm on Fridays, or on the weekends. We encourage everyone to explore another way of getting your prescription refill request processed using [employer], our patient web portal through our electronic medical record system. [employer] is an efficient and effective way to communicate your medication request directly to the office and  downloadable as an sam on your smart phone . [employer] also features a review functionality that allows you to view your medication list as well as leave messages for your physician. Are you ready to get connected? If so please review the attatched instructions or speak to any of our staff to get you set up right away! Thank you so much for your cooperation. Should you have any questions please contact our Practice Administrator. The Physicians and Staff,  [facility] Migraine Headache: Care Instructions Your Care Instructions Migraines are painful, throbbing headaches that often start on one side of the head. They may cause nausea and vomiting and make you sensitive to light, sound, or smell. Without treatment, migraines can last from 4 hours to a few days. Medicines can help prevent migraines or stop them after they have started. Your doctor can help you find which ones work best for you. Follow-up care is a key part of your treatment and safety. Be sure to make and go to all appointments, and call your doctor if you are having problems. It's also a good idea to know your test results and keep a list of the medicines you take. How can you care for yourself at home? · Do not drive if you have taken a prescription pain medicine. · Rest in a quiet, dark room until your headache is gone. Close your eyes, and try to relax or go to sleep. Don't watch TV or read. · Put a cold, moist cloth or cold pack on the painful area for 10 to 20 minutes at a time. Put a thin cloth between the cold pack and your skin. · Use a warm, moist towel or a heating pad set on low to relax tight shoulder and neck muscles. · Have someone gently massage your neck and shoulders. · Take your medicines exactly as prescribed. Call your doctor if you think you are having a problem with your medicine. You will get more details on the specific medicines your doctor prescribes. · Be careful not to take pain medicine more often than the instructions allow. You could get worse or more frequent headaches when the medicine wears off. To prevent migraines · Keep a headache diary so you can figure out what triggers your headaches. Avoiding triggers may help you prevent headaches.  Record when each headache began, how long it lasted, and what the pain was like. (Was it throbbing, aching, stabbing, or dull?) Write down any other symptoms you had with the headache, such as nausea, flashing lights or dark spots, or sensitivity to bright light or loud noise. Note if the headache occurred near your period. List anything that might have triggered the headache. Triggers may include certain foods (chocolate, cheese, wine) or odors, smoke, bright light, stress, or lack of sleep. · If your doctor has prescribed medicine for your migraines, take it as directed. You may have medicine that you take only when you get a migraine and medicine that you take all the time to help prevent migraines. ¨ If your doctor has prescribed medicine for when you get a headache, take it at the first sign of a migraine, unless your doctor has given you other instructions. ¨ If your doctor has prescribed medicine to prevent migraines, take it exactly as prescribed. Call your doctor if you think you are having a problem with your medicine. · Find healthy ways to deal with stress. Migraines are most common during or right after stressful times. Take time to relax before and after you do something that has caused a migraine in the past. 
· Try to keep your muscles relaxed by keeping good posture. Check your jaw, face, neck, and shoulder muscles for tension. Try to relax them. When you sit at a desk, change positions often. And make sure to stretch for 30 seconds each hour. · Get plenty of sleep and exercise. · Eat meals on a regular schedule. Avoid foods and drinks that often trigger migraines. These include chocolate, alcohol (especially red wine and port), aspartame, monosodium glutamate (MSG), and some additives found in foods (such as hot dogs, parrish, cold cuts, aged cheeses, and pickled foods). · Limit caffeine. Don't drink too much coffee, tea, or soda. But don't quit caffeine suddenly. That can also give you migraines. · Do not smoke or allow others to smoke around you. If you need help quitting, talk to your doctor about stop-smoking programs and medicines. These can increase your chances of quitting for good. · If you are taking birth control pills or hormone therapy, talk to your doctor about whether they are triggering your migraines. When should you call for help? Call 911 anytime you think you may need emergency care. For example, call if: 
? · You have signs of a stroke. These may include: 
¨ Sudden numbness, paralysis, or weakness in your face, arm, or leg, especially on only one side of your body. ¨ Sudden vision changes. ¨ Sudden trouble speaking. ¨ Sudden confusion or trouble understanding simple statements. ¨ Sudden problems with walking or balance. ¨ A sudden, severe headache that is different from past headaches. ?Call your doctor now or seek immediate medical care if: 
? · You have new or worse nausea and vomiting. ? · You have a new or higher fever. ? · Your headache gets much worse. ? Watch closely for changes in your health, and be sure to contact your doctor if: 
? · You are not getting better after 2 days (48 hours). Where can you learn more? Go to http://daisy-millicent.info/. Enter P878 in the search box to learn more about \"Migraine Headache: Care Instructions. \" Current as of: October 14, 2016 Content Version: 11.4 © 7333-3927 Healthwise, Incorporated. Care instructions adapted under license by [employer] (which disclaims liability or warranty for this information). If you have questions about a medical condition or this instruction, always ask your healthcare professional. [place] [address] any warranty or liability for your use of this information. Patient Instructions History Introducing [facility]! Deajacob Liang Sat: Thank you for requesting a [employer] account.   Our records indicate that you already have an active [employer] account. You can access your account anytime at [email or web address]. [employer] Did you know that you can access your hospital and ER discharge instructions at any time in [employer]? You can also review all of your test results from your hospital stay or ER visit. Additional Information If you have questions, please visit the Frequently Asked Questions section of the [employer] website at [email or web address]/. Remember, [employer] is NOT to be used for urgent needs. For medical emergencies, dial 911. Now available from your iPhone and Android! Please provide this summary of care documentation to your next provider. Your primary care clinician is listed as Zoya Berger. If you have any questions after today's visit, please call [phone].

## 2018-04-12 NOTE — TELEPHONE ENCOUNTER
The pt is confused on how to take one her medications.  I believe she said the \"Carafate\"     Best contact is [phone]

## 2018-04-12 NOTE — TELEPHONE ENCOUNTER
Pt wants office to call mother Snehalemily Jessica at [phone] to explain how to take the medication

## 2018-04-12 NOTE — PATIENT INSTRUCTIONS
10 [facility]   Statement to Patients  April 1, 2014      In an effort to ensure the large volume of patient prescription refills is processed in the most efficient and expeditious manner, we are asking our patients to assist us by calling your Pharmacy for all prescription refills, this will include also your  Mail Order Pharmacy. The pharmacy will contact our office electronically to continue the refill process. Please do not wait until the last minute to call your pharmacy. We need at least 48 hours (2days) to fill prescriptions. We also encourage you to call your pharmacy before going to  your prescription to make sure it is ready. With regard to controlled substance prescription refill requests (narcotic refills) that need to be picked up at our office, we ask your cooperation by providing us with at least 72 hours (3days) notice that you will need a refill. We will not refill narcotic prescription refill requests after 4:00pm on any weekday, Monday through Thursday, or after 2:00pm on Fridays, or on the weekends. We encourage everyone to explore another way of getting your prescription refill request processed using [employer], our patient web portal through our electronic medical record system. [employer] is an efficient and effective way to communicate your medication request directly to the office and  downloadable as an sam on your smart phone . [employer] also features a review functionality that allows you to view your medication list as well as leave messages for your physician. Are you ready to get connected? If so please review the attatched instructions or speak to any of our staff to get you set up right away! Thank you so much for your cooperation. Should you have any questions please contact our Practice Administrator.     The Physicians and Staff,  [facility] [address] Neurology [facility] Statement to Patients  April 1, 2014      In an effort to ensure the large volume of patient prescription refills is processed in the most efficient and expeditious manner, we are asking our patients to assist us by calling your Pharmacy for all prescription refills, this will include also your  Mail Order Pharmacy. The pharmacy will contact our office electronically to continue the refill process. Please do not wait until the last minute to call your pharmacy. We need at least 48 hours (2days) to fill prescriptions. We also encourage you to call your pharmacy before going to  your prescription to make sure it is ready. With regard to controlled substance prescription refill requests (narcotic refills) that need to be picked up at our office, we ask your cooperation by providing us with at least 72 hours (3days) notice that you will need a refill. We will not refill narcotic prescription refill requests after 4:00pm on any weekday, Monday through Thursday, or after 2:00pm on Fridays, or on the weekends. We encourage everyone to explore another way of getting your prescription refill request processed using [employer], our patient web portal through our electronic medical record system. [employer] is an efficient and effective way to communicate your medication request directly to the office and  downloadable as an sam on your smart phone . [employer] also features a review functionality that allows you to view your medication list as well as leave messages for your physician. Are you ready to get connected? If so please review the attatched instructions or speak to any of our staff to get you set up right away! Thank you so much for your cooperation. Should you have any questions please contact our Practice Administrator.     The Physicians and Staff,  [facility]        Migraine Headache: Care Instructions  Your Care Instructions  Migraines are painful, throbbing headaches that often start on one side of the head. They may cause nausea and vomiting and make you sensitive to light, sound, or smell. Without treatment, migraines can last from 4 hours to a few days. Medicines can help prevent migraines or stop them after they have started. Your doctor can help you find which ones work best for you. Follow-up care is a key part of your treatment and safety. Be sure to make and go to all appointments, and call your doctor if you are having problems. It's also a good idea to know your test results and keep a list of the medicines you take. How can you care for yourself at home? · Do not drive if you have taken a prescription pain medicine. · Rest in a quiet, dark room until your headache is gone. Close your eyes, and try to relax or go to sleep. Don't watch TV or read. · Put a cold, moist cloth or cold pack on the painful area for 10 to 20 minutes at a time. Put a thin cloth between the cold pack and your skin. · Use a warm, moist towel or a heating pad set on low to relax tight shoulder and neck muscles. · Have someone gently massage your neck and shoulders. · Take your medicines exactly as prescribed. Call your doctor if you think you are having a problem with your medicine. You will get more details on the specific medicines your doctor prescribes. · Be careful not to take pain medicine more often than the instructions allow. You could get worse or more frequent headaches when the medicine wears off. To prevent migraines  · Keep a headache diary so you can figure out what triggers your headaches. Avoiding triggers may help you prevent headaches. Record when each headache began, how long it lasted, and what the pain was like. (Was it throbbing, aching, stabbing, or dull?) Write down any other symptoms you had with the headache, such as nausea, flashing lights or dark spots, or sensitivity to bright light or loud noise. Note if the headache occurred near your period.  List anything that might have triggered the headache. Triggers may include certain foods (chocolate, cheese, wine) or odors, smoke, bright light, stress, or lack of sleep. · If your doctor has prescribed medicine for your migraines, take it as directed. You may have medicine that you take only when you get a migraine and medicine that you take all the time to help prevent migraines. ¨ If your doctor has prescribed medicine for when you get a headache, take it at the first sign of a migraine, unless your doctor has given you other instructions. ¨ If your doctor has prescribed medicine to prevent migraines, take it exactly as prescribed. Call your doctor if you think you are having a problem with your medicine. · Find healthy ways to deal with stress. Migraines are most common during or right after stressful times. Take time to relax before and after you do something that has caused a migraine in the past.  · Try to keep your muscles relaxed by keeping good posture. Check your jaw, face, neck, and shoulder muscles for tension. Try to relax them. When you sit at a desk, change positions often. And make sure to stretch for 30 seconds each hour. · Get plenty of sleep and exercise. · Eat meals on a regular schedule. Avoid foods and drinks that often trigger migraines. These include chocolate, alcohol (especially red wine and port), aspartame, monosodium glutamate (MSG), and some additives found in foods (such as hot dogs, parrish, cold cuts, aged cheeses, and pickled foods). · Limit caffeine. Don't drink too much coffee, tea, or soda. But don't quit caffeine suddenly. That can also give you migraines. · Do not smoke or allow others to smoke around you. If you need help quitting, talk to your doctor about stop-smoking programs and medicines. These can increase your chances of quitting for good. · If you are taking birth control pills or hormone therapy, talk to your doctor about whether they are triggering your migraines.   When should you call for help? Call 911 anytime you think you may need emergency care. For example, call if:  ? · You have signs of a stroke. These may include:  ¨ Sudden numbness, paralysis, or weakness in your face, arm, or leg, especially on only one side of your body. ¨ Sudden vision changes. ¨ Sudden trouble speaking. ¨ Sudden confusion or trouble understanding simple statements. ¨ Sudden problems with walking or balance. ¨ A sudden, severe headache that is different from past headaches. ?Call your doctor now or seek immediate medical care if:  ? · You have new or worse nausea and vomiting. ? · You have a new or higher fever. ? · Your headache gets much worse. ? Watch closely for changes in your health, and be sure to contact your doctor if:  ? · You are not getting better after 2 days (48 hours). Where can you learn more? Go to http://daisy-millicent.info/. Enter V570 in the search box to learn more about \"Migraine Headache: Care Instructions. \"  Current as of: October 14, 2016  Content Version: 11.4  © 0161-6910 Healthwise, Incorporated. Care instructions adapted under license by [employer] (which disclaims liability or warranty for this information). If you have questions about a medical condition or this instruction, always ask your healthcare professional. [address] any warranty or liability for your use of this information.

## 2018-04-12 NOTE — PROGRESS NOTES
Neurology Progress Note    Patient ID:  Nithin Logan  [id number]  11 y.o.  1970      Subjective:   History:  Fidel Kelly is a 50 y.o. female who  has a past medical history of Back pain; Borderline personality disorder; Dysthymic disorder; Headache; Insomnia, PTSD, PUD (peptic ulcer disease); and Schizoaffective disorder (HCC). who for the past 2-3 months, noted severe headache, R periorbital radiating to R temple and frontal area, 3-4/ week, lasting 4 hrs, stress seem to be a trigger, Imitrex works, has nausea, photophobia and phonophobia. Consideration include migraine, without visual auras, intractable which may be related to perimenopause and stress due to recent passing of father from a pedestrian accident. Patient also noted cognitive issues for 5 months described as jumbling up words concerning for mild cognitive impairment probably due to her psych medication and again stress from her dad's passing. Propranolol caused SOB and diarrhea.      Last time, patient declined to go back to Topamax with note of worsening headache. 2 weeks ago, had severe headache lasting for 4 days, so she went back to Topamax 50 mg QHS   Has no headache for 2 days now. Imitrex still works. 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  98 Laura Clay mL 0    topiramate (TOPAMAX) 50 mg tablet Take 1 Tab by mouth nightly. 30 Tab 4    promethazine (PHENERGAN) 25 mg tablet TAKE ONE-HALF TO ONE TABLET BY MOUTH EVERY 6 HOURS AS NEEDED 30 Tab 3    SUMAtriptan (IMITREX) 100 mg tablet TAKE ONE TABLET BY MOUTH AT ONSET OF HEADACHE. MAY REPEAT ONE TABLET AFTER 2 HOURS *MAXIMUM OF TWO TABLETS A DAY * 9 Tab 5    escitalopram oxalate (LEXAPRO) 20 mg tablet       diazePAM (VALIUM) 10 mg tablet 10 mg two (2) times a day. Indications: anxiety      prazosin (MINIPRESS) 5 mg capsule Take 2 mg by mouth nightly.  buPROPion SR (WELLBUTRIN SR) 150 mg SR tablet Take 150 mg by mouth every morning.  ziprasidone (GEODON) 40 mg capsule Take 40 mg by mouth two (2) times daily (with meals).  benztropine (COGENTIN) 1 mg tablet Take 1 mg by mouth two (2) times a day.  divalproex DR (DEPAKOTE) 250 mg tablet Take 250 mg by mouth two (2) times a day.  cloZAPine (CLOZARIL) 100 mg tablet Take 200 mg by mouth nightly. 2 tabs @ hs      lithium carbonate 300 mg tablet Take 1 Tab by mouth daily. Indications: mood stabilization (Patient taking differently: Take 300 mg by mouth two (2) times a day. take one twice a day  Indications: mood stabilization) 7 Tab 0    pantoprazole (PROTONIX) 20 mg tablet Take 1 Tab by mouth daily. 30 Tab 0    lidocaine (LIDODERM) 5 % Apply 2 patches to the affected area on your low back for 12 hours a day and remove for 12 hours a day. Indications: back pain 12 Each 0     No current facility-administered medications on file prior to visit. Imaging:    CT Results (recent):    Results from Hospital Encounter encounter on 06/29/17   CT HEAD WO CONT   Narrative EXAM:  CT HEAD WO CONT    INDICATION:   Headache, chronic, new features or neuro deficit    COMPARISON: 2010. TECHNIQUE: Unenhanced CT of the head was performed using 5 mm images. Brain and  bone windows were generated.   CT dose reduction was achieved through use of a  standardized protocol tailored for this examination and automatic exposure  control for dose modulation. Adaptive statistical iterative reconstruction  (ASIR) was utilized. FINDINGS:  There is no extra-axial fluid collection hemorrhage shift or masses her. Ventricles are normal in size. Impression impression: Negative. MRI Results (recent):  No results found for this or any previous visit. IR Results (recent):  No results found for this or any previous visit. VAS/US Results (recent):  No results found for this or any previous visit. Reviewed records in [employer] [facility] [employer] tab today    Lab Review     Admission on 03/12/2018, Discharged on 03/12/2018   Component Date Value Ref Range Status    Calcium, ionized (POC) 03/12/2018 1.21  1.12 - 1.32 MMOL/L Final    Sodium (POC) 03/12/2018 138  136 - 145 MMOL/L Final    Potassium (POC) 03/12/2018 4.5  3.5 - 5.1 MMOL/L Final    Chloride (POC) 03/12/2018 101  98 - 107 MMOL/L Final    CO2 (POC) 03/12/2018 28  21 - 32 MMOL/L Final    Anion gap (POC) 03/12/2018 14  10 - 20 mmol/L Final    PLEASE NOTE NEW REFERENCE RANGE    Glucose (POC) 03/12/2018 98  65 - 100 MG/DL Final    BUN (POC) 03/12/2018 18  9 - 20 MG/DL Final    Creatinine (POC) 03/12/2018 1.1  0.6 - 1.3 MG/DL Final    GFRAA, POC 03/12/2018 >60  >60 ml/min/1.73m2 Final    GFRNA, POC 03/12/2018 53* >60 ml/min/1.73m2 Final    Comment: Estimated GFR is calculated using the IDMS-traceable Modification of Diet in Renal Disease (MDRD) Study equation, reported for both  Americans (GFRAA) and non- Americans (GFRNA), and normalized to 1.73m2 body surface area. The physician must decide which value applies to the patient. The MDRD study equation should only be used in individuals age 25 or older.  It has not been validated for the following: pregnant women, patients with serious comorbid conditions, or on certain medications, or persons with extremes of body size, muscle mass, or nutritional status.  Hematocrit (POC) 03/12/2018 38  35.0 - 47.0 % Final    Comment 03/12/2018 Comment Not Indicated. Final         Objective:       Exam:  Visit Vitals    /70    Pulse 73    Wt 103 kg (227 lb)    SpO2 98%    BMI 36.64 kg/m2     Gen: Awake, alert, follows commands  Appropriate appearance, normal speech. Oriented to all spheres. No visual field defect on confrontation exam.  Full eyes movement, with no nystagmus, no diplopia, no ptosis. Normal gag and swallow. All remaining cranial nerves were normal  Motor function: 5/5 in all extremities  Sensory: intact to LT, PP and JPS  Good FTN and HTS   Gait: Normal    Assessment:       ICD-10-CM ICD-9-CM    1. Migraine without aura and without status migrainosus, not intractable G43.009 346.10            Plan:   1. Continue Topamax 50 mg QHS for migraine prevention  2. Continue Imitrex 100 mg prn to try to abort headaches. 3. Continue Phenergan 25 mg prn for nausea  4. Continue headache diary and went through the list that can trigger headache (stress)    Follow-up Disposition:  Return in about 3 months (around 7/12/2018).           Eloina Tucker MD  Diplomate, American Board of Psychiatry and Neurology  Diplomate, Neuromuscular Medicine  Diplomate, American Board of Electrodiagnostic Medicine

## 2018-04-16 DIAGNOSIS — K27.9 PUD (PEPTIC ULCER DISEASE): ICD-10-CM

## 2018-04-16 DIAGNOSIS — K21.9 GASTROESOPHAGEAL REFLUX DISEASE WITHOUT ESOPHAGITIS: ICD-10-CM

## 2018-04-16 NOTE — TELEPHONE ENCOUNTER
The pt is calling because she is taking carafate and she states the heart burn has stopped but her stomach has been hurting and she now has had diarrhea for 5 hours. She is wondering if she can take imodium?     Callback # [phone]

## 2018-04-16 NOTE — TELEPHONE ENCOUNTER
Pt notified that Bob Chapa NP states she can take her Imodium as directed on box for diarrhea today. Please call office back if any worsening in symptoms or concerns. Pt states understanding.

## 2018-04-19 RX ORDER — SUCRALFATE 1 G/10ML
1 SUSPENSION ORAL 4 TIMES DAILY
Qty: 414 ML | Refills: 0 | OUTPATIENT
Start: 2018-04-19

## 2018-04-20 ENCOUNTER — TELEPHONE (OUTPATIENT)
Dept: FAMILY MEDICINE CLINIC | Age: 48
End: 2018-04-20

## 2018-04-20 NOTE — TELEPHONE ENCOUNTER
Pt stated nurse called to give information for pt to see a gastro doctor but no information in the system please call pt and advise

## 2018-05-18 ENCOUNTER — OFFICE VISIT (OUTPATIENT)
Dept: FAMILY MEDICINE CLINIC | Age: 48
End: 2018-05-18

## 2018-05-18 VITALS
DIASTOLIC BLOOD PRESSURE: 61 MMHG | HEIGHT: 66 IN | OXYGEN SATURATION: 97 % | BODY MASS INDEX: 37.45 KG/M2 | RESPIRATION RATE: 20 BRPM | SYSTOLIC BLOOD PRESSURE: 112 MMHG | WEIGHT: 233 LBS | TEMPERATURE: 98.3 F | HEART RATE: 92 BPM

## 2018-05-18 DIAGNOSIS — R32 URINARY INCONTINENCE, UNSPECIFIED TYPE: Primary | ICD-10-CM

## 2018-05-18 RX ORDER — DULOXETIN HYDROCHLORIDE 60 MG/1
60 CAPSULE, DELAYED RELEASE ORAL DAILY
COMMUNITY
Start: 2018-05-02 | End: 2019-07-18 | Stop reason: ALTCHOICE

## 2018-05-18 RX ORDER — BUPROPION HYDROCHLORIDE 200 MG/1
TABLET, EXTENDED RELEASE ORAL
COMMUNITY
Start: 2018-03-28 | End: 2018-08-28 | Stop reason: ALTCHOICE

## 2018-05-18 RX ORDER — PANTOPRAZOLE SODIUM 40 MG/1
TABLET, DELAYED RELEASE ORAL
COMMUNITY
Start: 2018-05-03 | End: 2018-09-24

## 2018-05-18 RX ORDER — PRAZOSIN HYDROCHLORIDE 2 MG/1
2 CAPSULE ORAL
COMMUNITY
Start: 2018-03-22

## 2018-05-18 NOTE — PROGRESS NOTES
HISTORY OF PRESENT ILLNESS  Victoria Mock is a 50 y.o. female. HPI  Pt presents with \"incontinece\"    Pt states that for the past 2 weeks, she has been wetting the bed. Pt states that this has happened every morning, when she is awake and rolls over to get out of bed. Pt states that she is awake when this happens, and has the urge to urinate, but is unable to control it. Pt denies any burning with urination, no blood in urine, no urinary odor. Pt states that she has had some changes to her [facility] medications recently, and informed Dr Talat Veronica of this, but he was unable to decipher what would be causing this. Pt started on Cymbalta about a month ago, and is wondering if this could correlate to what is going on? Review of Systems   Constitutional: Negative for fever. HENT: Negative for congestion. Respiratory: Negative for cough. Gastrointestinal: Negative for diarrhea and vomiting. Physical Exam   Constitutional: She is oriented to person, place, and time. She appears well-developed and well-nourished. HENT:   Head: Normocephalic and atraumatic. Cardiovascular: Normal rate, regular rhythm and normal heart sounds. Pulmonary/Chest: Effort normal and breath sounds normal.   Neurological: She is alert and oriented to person, place, and time. Skin: Skin is warm and dry. Psychiatric: She has a normal mood and affect. Her behavior is normal.       ASSESSMENT and PLAN    ICD-10-CM ICD-9-CM    1. Urinary incontinence, unspecified type R32 788.30 REFERRAL TO UROLOGY     Informed patient to call urology and make an appointment ASAP. Pt informed to return to office with worsening of symptoms, or PRN with any questions or concerns. Pt verbalizes understanding of plan of care and denies further questions or concerns at this time.

## 2018-05-18 NOTE — MR AVS SNAPSHOT
Mina Bolden 
 
 
 [address] [facility] 
[phone] Patient: Shanna Palacios MRN: [id number] :1970 Visit Information Date & Time Provider Department Dept. Phone Encounter #  
 2018  1:00 PM Fermín Watson [place] [phone] [id number] Follow-up Instructions Return if symptoms worsen or fail to improve. Your Appointments 2018  2:40 PM  
Follow Up with Dane Yu MD  
[facility]) Appt Note: 3 month f/u migraine [place] [address] [facility] [id number] [phone]  
  
   
 [place] [address] [address] [address] Upcoming Health Maintenance Date Due Pneumococcal 19-64 Highest Risk (3 of 3 - PCV13) 2018 Influenza Age 5 to Adult 2018 PAP AKA CERVICAL CYTOLOGY 2020 DTaP/Tdap/Td series (2 - Td) 2027 Allergies as of 2018  Review Complete On: 2018 By: Fermín Watson NP Severity Noted Reaction Type Reactions Penicillin G High 10/01/2012   Systemic Anaphylaxis Aspirin  10/01/2012   Side Effect Nausea and Vomiting Pt states she isn't allergic to ASA she just can't take  due to gastric ulcers. Current Immunizations  Reviewed on 3/17/2015 Name Date Influenza Vaccine PF 3/16/2015  5:12 AM  
 Pneumococcal Polysaccharide (PPSV-23) 3/16/2015  5:15 AM  
  
 Not reviewed this visit You Were Diagnosed With   
  
 Codes Comments Urinary incontinence, unspecified type    -  Primary ICD-10-CM: R32 
ICD-9-CM: 788.30 Vitals BP Pulse Temp Resp Height(growth percentile) Weight(growth percentile) 112/61 92 98.3 °F (36.8 °C) (Oral) 20 5' 6\" (1.676 m) 233 lb (105.7 kg) LMP SpO2 BMI OB Status Smoking Status 2018 97% 37.61 kg/m2 Having regular periods Former Smoker BMI and BSA Data Body Mass Index Body Surface Area  
 37.61 kg/m 2 2.22 m 2 Preferred Pharmacy Pharmacy Name Phone [id number] - [place] [address] [place] [address] [phone] Your Updated Medication List  
  
   
This list is accurate as of 5/18/18  1:19 PM.  Always use your most recent med list.  
  
  
  
  
 benztropine 1 mg tablet Commonly known as:  COGENTIN Take 1 mg by mouth two (2) times a day. * buPROPion  mg SR tablet Commonly known as:  Laurian Brittani Take 150 mg by mouth every morning. * buPROPion  mg SR tablet Commonly known as:  WELLBUTRIN ZYBAN  
  
 cloZAPine 100 mg tablet Commonly known as:  CLOZARIL Take 200 mg by mouth nightly. 2 tabs @ hs  
  
 diazePAM 10 mg tablet Commonly known as:  VALIUM  
10 mg two (2) times a day. Indications: anxiety  
  
 divalproex  mg tablet Commonly known as:  DEPAKOTE Take 250 mg by mouth two (2) times a day. DULoxetine 60 mg capsule Commonly known as:  CYMBALTA  
  
 escitalopram oxalate 20 mg tablet Commonly known as:  LEXAPRO  
  
 lidocaine 5 % Commonly known as:  Ely Bynum Apply 2 patches to the affected area on your low back for 12 hours a day and remove for 12 hours a day. Indications: back pain  
  
 lithium carbonate 300 mg tablet Take 1 Tab by mouth daily. Indications: mood stabilization * pantoprazole 20 mg tablet Commonly known as:  PROTONIX Take 1 Tab by mouth daily. * pantoprazole 40 mg tablet Commonly known as:  PROTONIX * prazosin 5 mg capsule Commonly known as:  MINIPRESS Take 2 mg by mouth nightly. * prazosin 2 mg capsule Commonly known as:  MINIPRESS  
  
 promethazine 25 mg tablet Commonly known as:  PHENERGAN  
TAKE ONE-HALF TO ONE TABLET BY MOUTH EVERY 6 HOURS AS NEEDED  
  
 sucralfate 100 mg/mL suspension Commonly known as:  Mario Doctor Take 10 mL by mouth four (4) times daily. SUMAtriptan 100 mg tablet Commonly known as:  IMITREX  
TAKE ONE TABLET BY MOUTH AT ONSET OF HEADACHE. MAY REPEAT ONE TABLET AFTER 2 HOURS *MAXIMUM OF TWO TABLETS A DAY *  
  
 topiramate 50 mg tablet Commonly known as:  TOPAMAX Take 1 Tab by mouth nightly. ziprasidone 40 mg capsule Commonly known as:  Suellyn Loft Take 40 mg by mouth two (2) times daily (with meals). * Notice: This list has 6 medication(s) that are the same as other medications prescribed for you. Read the directions carefully, and ask your doctor or other care provider to review them with you. We Performed the Following REFERRAL TO UROLOGY [CZQ151 Custom] Follow-up Instructions Return if symptoms worsen or fail to improve. Referral Information Referral ID Referred By Referred To  
  
 [id number] JASSI [address] [place] [address] Phone: [phone] Fax: [phone] Visits Status Start Date End Date 1 New Request 5/18/18 5/18/19 If your referral has a status of pending review or denied, additional information will be sent to support the outcome of this decision. Patient Instructions Stress Incontinence in Women: Care Instructions Your Care Instructions Stress incontinence is the accidental release of urine caused by activities that put pressure on your bladder. It may happen most often when you sneeze, cough, laugh, jog, or lift something heavy. This condition does not cause major health problems, but it can be embarrassing and interfere with your life. Treatment can cure or improve your symptoms. Follow-up care is a key part of your treatment and safety. Be sure to make and go to all appointments, and call your doctor if you are having problems. It's also a good idea to know your test results and keep a list of the medicines you take. How can you care for yourself at home? · Take your medicines exactly as prescribed. Call your doctor if you think you are having a problem with your medicine. · Limit caffeine and alcohol. They make you urinate more. · Do pelvic floor (Kegel) exercises, which tighten and strengthen pelvic muscles. To do Kegel exercises: 
¨ Squeeze the same muscles you would use to stop your urine. Your belly and thighs should not move. ¨ Hold the squeeze for 3 seconds, and then relax for 3 seconds. ¨ Start with 3 seconds. Then add 1 second each week until you are able to squeeze for 10 seconds. ¨ Repeat the exercise 10 to 15 times a session. Do three or more sessions a day. · Try wearing pads that absorb leaks. Or you may want to try to prevent leaks with a product like Poise Impressa, which you insert like a tampon. · Keep skin in the genital area dry. Petroleum jelly (like Vaseline) spread on the area may help protect your skin. When should you call for help? Call your doctor now or seek immediate medical care if: 
? · You have new urinary symptoms. These may include leaking urine, having pain when urinating, or feeling like you need to urinate often. ? Watch closely for changes in your health, and be sure to contact your doctor if: 
? · You do not get better as expected. Where can you learn more? Go to http://daisy-millicent.info/. Enter B168 in the search box to learn more about \"Stress Incontinence in Women: Care Instructions. \" Current as of: October 13, 2016 Content Version: 11.4 © 0521-3672 [employer]. Care instructions adapted under license by [employer] (which disclaims liability or warranty for this information). If you have questions about a medical condition or this instruction, always ask your healthcare professional. [address] any warranty or liability for your use of this information. Introducing [facility]!    
 Dear Jimmie Haider: 
 Thank you for requesting a [employer] account. Our records indicate that you already have an active [employer] account. You can access your account anytime at [email or web address] Did you know that you can access your hospital and ER discharge instructions at any time in [employer]? You can also review all of your test results from your hospital stay or ER visit. Additional Information If you have questions, please visit the Frequently Asked Questions section of the [employer] website at [email or web address] Remember, [employer] is NOT to be used for urgent needs. For medical emergencies, dial 911. Now available from your iPhone and Android! Please provide this summary of care documentation to your next provider. Your primary care clinician is listed as Zoya Berger. If you have any questions after today's visit, please call [phone].

## 2018-05-18 NOTE — PATIENT INSTRUCTIONS
Stress Incontinence in Women: Care Instructions  Your Care Instructions  Stress incontinence is the accidental release of urine caused by activities that put pressure on your bladder. It may happen most often when you sneeze, cough, laugh, jog, or lift something heavy. This condition does not cause major health problems, but it can be embarrassing and interfere with your life. Treatment can cure or improve your symptoms. Follow-up care is a key part of your treatment and safety. Be sure to make and go to all appointments, and call your doctor if you are having problems. It's also a good idea to know your test results and keep a list of the medicines you take. How can you care for yourself at home? · Take your medicines exactly as prescribed. Call your doctor if you think you are having a problem with your medicine. · Limit caffeine and alcohol. They make you urinate more. · Do pelvic floor (Kegel) exercises, which tighten and strengthen pelvic muscles. To do Kegel exercises:  ¨ Squeeze the same muscles you would use to stop your urine. Your belly and thighs should not move. ¨ Hold the squeeze for 3 seconds, and then relax for 3 seconds. ¨ Start with 3 seconds. Then add 1 second each week until you are able to squeeze for 10 seconds. ¨ Repeat the exercise 10 to 15 times a session. Do three or more sessions a day. · Try wearing pads that absorb leaks. Or you may want to try to prevent leaks with a product like Poise Impressa, which you insert like a tampon. · Keep skin in the genital area dry. Petroleum jelly (like Vaseline) spread on the area may help protect your skin. When should you call for help? Call your doctor now or seek immediate medical care if:  ? · You have new urinary symptoms. These may include leaking urine, having pain when urinating, or feeling like you need to urinate often. ? Watch closely for changes in your health, and be sure to contact your doctor if:  ? · You do not get better as expected. Where can you learn more? Go to http://daisy-millicent.info/. Enter O522 in the search box to learn more about \"Stress Incontinence in Women: Care Instructions. \"  Current as of: October 13, 2016  Content Version: 11.4  © 2199-7566 [employer]. Care instructions adapted under license by [employer] (which disclaims liability or warranty for this information). If you have questions about a medical condition or this instruction, always ask your healthcare professional. [address] any warranty or liability for your use of this information.

## 2018-05-18 NOTE — PROGRESS NOTES
Chief Complaint   Patient presents with    Incontinence     pt states she has been wetting the bed for last 1-2 weeks. does not happen during the daytime. \"REVIEWED RECORD IN PREPARATION FOR VISIT AND HAVE OBTAINED THE NECESSARY DOCUMENTATION\"  1. Have you been to the ER, urgent care clinic since your last visit? Hospitalized since your last visit? No    2. Have you seen or consulted any other health care providers outside of the [address] [place] [address] since your last visit? Include any pap smears or colon screening. Yes Where: sees Dr. Ammon Joseph for psych  Patient does not have advanced directives.

## 2018-05-29 ENCOUNTER — TELEPHONE (OUTPATIENT)
Dept: NEUROLOGY | Age: 48
End: 2018-05-29

## 2018-05-29 RX ORDER — ONDANSETRON HYDROCHLORIDE 8 MG/1
8 TABLET, FILM COATED ORAL
Qty: 30 TAB | Refills: 1 | Status: SHIPPED | OUTPATIENT
Start: 2018-05-29 | End: 2018-07-09 | Stop reason: ALTCHOICE

## 2018-05-29 NOTE — TELEPHONE ENCOUNTER
Order placed for Zofran 8 mg, # 30, PO, per Verbal Order from Dr. Deepa Cadet on 5/29/2018 due to nausea.

## 2018-05-29 NOTE — TELEPHONE ENCOUNTER
----- Message from Jed Espinosa sent at 5/29/2018  9:51 AM EDT -----  Regarding: Dr. Mark Hampton stated she has been having a lot of migraines and would like a different medication called in for the nausea, because the \"Promethazine\" is not working called to [facility] [phone]. Best contact number [phone] or(c) [phone].

## 2018-07-09 ENCOUNTER — OFFICE VISIT (OUTPATIENT)
Dept: NEUROLOGY | Age: 48
End: 2018-07-09

## 2018-07-09 VITALS
BODY MASS INDEX: 37.61 KG/M2 | SYSTOLIC BLOOD PRESSURE: 132 MMHG | RESPIRATION RATE: 18 BRPM | DIASTOLIC BLOOD PRESSURE: 84 MMHG | HEART RATE: 92 BPM | WEIGHT: 234 LBS | HEIGHT: 66 IN | OXYGEN SATURATION: 99 %

## 2018-07-09 DIAGNOSIS — G43.909 MIGRAINE WITHOUT STATUS MIGRAINOSUS, NOT INTRACTABLE, UNSPECIFIED MIGRAINE TYPE: ICD-10-CM

## 2018-07-09 RX ORDER — PROMETHAZINE HYDROCHLORIDE 25 MG/1
TABLET ORAL
Qty: 30 TAB | Refills: 3 | Status: SHIPPED | OUTPATIENT
Start: 2018-07-09 | End: 2018-07-27 | Stop reason: SDUPTHER

## 2018-07-09 RX ORDER — MIRTAZAPINE 30 MG/1
30 TABLET, FILM COATED ORAL
COMMUNITY
Start: 2018-06-13

## 2018-07-09 RX ORDER — TROSPIUM CHLORIDE 20 MG/1
20 TABLET, FILM COATED ORAL
COMMUNITY
Start: 2018-07-03 | End: 2018-09-24

## 2018-07-09 NOTE — PROGRESS NOTES
Neurology Progress Note    Patient ID:  Suki Chan  [id number]  85 y.o.  1970      Subjective:   History:  Lisbeth Kelly is a 50 y.o. female who  has a past medical history of Back pain; Borderline personality disorder; Dysthymic disorder; Headache; Insomnia, PTSD, PUD (peptic ulcer disease); and Schizoaffective disorder (HCC). who for the past 2-3 months, noted severe headache, R periorbital radiating to R temple and frontal area, 3-4/ week, lasting 4 hrs, stress seem to be a trigger, Imitrex works, has nausea, photophobia and phonophobia. Consideration include migraine, without visual auras, intractable which may be related to perimenopause and stress due to recent passing of father from a pedestrian accident. Patient also noted cognitive issues for 5 months described as jumbling up words concerning for mild cognitive impairment probably due to her psych medication and again stress from her dad's passing. Propranolol caused SOB and diarrhea.      Patient is now back on Topamax 50 mg QHS and getting 5 headaches/ month. Imitrex still works. Tried Zofran, but felt Phenergan worked better and wants to go back to it.     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 30 Tab 4    SUMAtriptan (IMITREX) 100 mg tablet TAKE ONE TABLET BY MOUTH AT ONSET OF HEADACHE. MAY REPEAT ONE TABLET AFTER 2 HOURS *MAXIMUM OF TWO TABLETS A DAY * 9 Tab 5    escitalopram oxalate (LEXAPRO) 20 mg tablet       diazePAM (VALIUM) 10 mg tablet 10 mg two (2) times a day. Indications: anxiety      ziprasidone (GEODON) 40 mg capsule Take 40 mg by mouth two (2) times daily (with meals).  benztropine (COGENTIN) 1 mg tablet Take 1 mg by mouth two (2) times a day.  divalproex DR (DEPAKOTE) 250 mg tablet Take 250 mg by mouth two (2) times a day.  cloZAPine (CLOZARIL) 100 mg tablet Take 200 mg by mouth nightly. 2 tabs @ hs      lithium carbonate 300 mg tablet Take 1 Tab by mouth daily. Indications: mood stabilization (Patient taking differently: Take 300 mg by mouth two (2) times a day. take one twice a day  Indications: mood stabilization) 7 Tab 0    buPROPion SR (WELLBUTRIN, ZYBAN) 200 mg SR tablet       pantoprazole (PROTONIX) 40 mg tablet       sucralfate (CARAFATE) 100 mg/mL suspension Take 10 mL by mouth four (4) times daily. 414 mL 0    pantoprazole (PROTONIX) 20 mg tablet Take 1 Tab by mouth daily. 30 Tab 0    lidocaine (LIDODERM) 5 % Apply 2 patches to the affected area on your low back for 12 hours a day and remove for 12 hours a day. Indications: back pain 12 Each 0    prazosin (MINIPRESS) 5 mg capsule Take 2 mg by mouth nightly.  buPROPion SR (WELLBUTRIN SR) 150 mg SR tablet Take 150 mg by mouth every morning. No current facility-administered medications on file prior to visit. Imaging:    CT Results (recent):    Results from Hospital Encounter encounter on 06/29/17   CT HEAD WO CONT   Narrative EXAM:  CT HEAD WO CONT    INDICATION:   Headache, chronic, new features or neuro deficit    COMPARISON: 2010. TECHNIQUE: Unenhanced CT of the head was performed using 5 mm images. Brain and  bone windows were generated.   CT dose reduction was achieved through use of a  standardized protocol tailored for this examination and automatic exposure  control for dose modulation. Adaptive statistical iterative reconstruction  (ASIR) was utilized. FINDINGS:  There is no extra-axial fluid collection hemorrhage shift or masses her. Ventricles are normal in size. Impression impression: Negative. MRI Results (recent):  No results found for this or any previous visit. IR Results (recent):  No results found for this or any previous visit. VAS/US Results (recent):  No results found for this or any previous visit. Reviewed records in [facility] tab today    Lab Review     No visits with results within 3 Month(s) from this visit. Latest known visit with results is:    Admission on 03/12/2018, Discharged on 03/12/2018   Component Date Value Ref Range Status    Calcium, ionized (POC) 03/12/2018 1.21  1.12 - 1.32 MMOL/L Final    Sodium (POC) 03/12/2018 138  136 - 145 MMOL/L Final    Potassium (POC) 03/12/2018 4.5  3.5 - 5.1 MMOL/L Final    Chloride (POC) 03/12/2018 101  98 - 107 MMOL/L Final    CO2 (POC) 03/12/2018 28  21 - 32 MMOL/L Final    Anion gap (POC) 03/12/2018 14  10 - 20 mmol/L Final    PLEASE NOTE NEW REFERENCE RANGE    Glucose (POC) 03/12/2018 98  65 - 100 MG/DL Final    BUN (POC) 03/12/2018 18  9 - 20 MG/DL Final    Creatinine (POC) 03/12/2018 1.1  0.6 - 1.3 MG/DL Final    GFRAA, POC 03/12/2018 >60  >60 ml/min/1.73m2 Final    GFRNA, POC 03/12/2018 53* >60 ml/min/1.73m2 Final    Comment: Estimated GFR is calculated using the IDMS-traceable Modification of Diet in Renal Disease (MDRD) Study equation, reported for both  Americans (GFRAA) and non- Americans (GFRNA), and normalized to 1.73m2 body surface area. The physician must decide which value applies to the patient. The MDRD study equation should only be used in individuals age 25 or older.  It has not been validated for the following: pregnant women, patients with serious comorbid conditions, or on certain medications, or persons with extremes of body size, muscle mass, or nutritional status.  Hematocrit (POC) 03/12/2018 38  35.0 - 47.0 % Final    Comment 03/12/2018 Comment Not Indicated. Final         Objective:       Exam:  Visit Vitals    /84    Pulse 92    Resp 18    Ht 5' 6\" (1.676 m)    Wt 106.1 kg (234 lb)    SpO2 99%    BMI 37.77 kg/m2     Gen: Awake, alert, follows commands  Appropriate appearance, normal speech. Oriented to all spheres. No visual field defect on confrontation exam.  Full eyes movement, with no nystagmus, no diplopia, no ptosis. Normal gag and swallow. All remaining cranial nerves were normal  Motor function: 5/5 in all extremities  Sensory: intact to LT, PP and JPS  Good FTN and HTS   Gait: Normal    Assessment:       ICD-10-CM ICD-9-CM    1. Migraine without status migrainosus, not intractable, unspecified migraine type G43.909 346.90 promethazine (PHENERGAN) 25 mg tablet           Plan:   1. Offered increasing Topamax, but patient prefers to stay on Topamax 50 mg QHS for migraine prevention  2. Continue Imitrex 100 mg prn to try to abort headaches. 3. Switched back to Phenergan 25 mg prn for nausea  4. Continue headache diary and went through the list that can trigger headache (stress)      Follow-up Disposition:  Return in about 6 months (around 1/9/2019).           Melany Oneill MD  Diplomate, American Board of Psychiatry and Neurology  Diplomate, Neuromuscular Medicine  Diplomate, American Board of Electrodiagnostic Medicine

## 2018-07-09 NOTE — MR AVS SNAPSHOT
[address] [facility] 
 
 
 [place] [address] [id number] [phone] Patient: Netta Dean MRN: [id number] :1970 Visit Information Date & Time Provider Department Dept. Phone Encounter #  
 2018  2:40 PM Jessica Oliveira MD [facility] [phone] [id number] Follow-up Instructions Return in about 6 months (around 2019). Your Appointments 2018  2:40 PM  
Follow Up with Jessica Oliveira MD  
[facility] Appt Note: 3 month f/u migraine [place] [address] [address] [phone]  
  
   
 [place] [address] [id number] [address] Upcoming Health Maintenance Date Due Pneumococcal 19-64 Highest Risk (3 of 3 - PCV13) 2018 Influenza Age 5 to Adult 2018 PAP AKA CERVICAL CYTOLOGY 2020 DTaP/Tdap/Td series (2 - Td) 2027 Allergies as of 2018  Review Complete On: 2018 By: Prince Johnson NP Severity Noted Reaction Type Reactions Penicillin G High 10/01/2012   Systemic Anaphylaxis Aspirin  10/01/2012   Side Effect Nausea and Vomiting Pt states she isn't allergic to ASA she just can't take  due to gastric ulcers. Current Immunizations  Reviewed on 3/17/2015 Name Date Influenza Vaccine PF 3/16/2015  5:12 AM  
 Pneumococcal Polysaccharide (PPSV-23) 3/16/2015  5:15 AM  
  
 Not reviewed this visit You Were Diagnosed With   
  
 Codes Comments Migraine without status migrainosus, not intractable, unspecified migraine type     ICD-10-CM: G43.909 ICD-9-CM: 346.90 Vitals BP Pulse Resp Height(growth percentile) Weight(growth percentile) SpO2  
 132/84 92 18 5' 6\" (1.676 m) 234 lb (106.1 kg) 99% BMI OB Status Smoking Status 37.77 kg/m2 Having regular periods Former Smoker Vitals History BMI and BSA Data Body Mass Index Body Surface Area  
 37.77 kg/m 2 2.22 m 2 Preferred Pharmacy Pharmacy Name Phone [facility] [address] [place] [address], [address] [place], [place] [phone] Your Updated Medication List  
  
   
This list is accurate as of 7/9/18  2:12 PM.  Always use your most recent med list.  
  
  
  
  
 benztropine 1 mg tablet Commonly known as:  COGENTIN Take 1 mg by mouth two (2) times a day. * buPROPion  mg SR tablet Commonly known as:  Davila Mccreedy Take 150 mg by mouth every morning. * buPROPion  mg SR tablet Commonly known as:  WELLBUTRIN, ZYBAN  
  
 cloZAPine 100 mg tablet Commonly known as:  CLOZARIL Take 200 mg by mouth nightly. 2 tabs @ hs  
  
 diazePAM 10 mg tablet Commonly known as:  VALIUM  
10 mg two (2) times a day. Indications: anxiety  
  
 divalproex  mg tablet Commonly known as:  DEPAKOTE Take 250 mg by mouth two (2) times a day. DULoxetine 60 mg capsule Commonly known as:  CYMBALTA  
  
 escitalopram oxalate 20 mg tablet Commonly known as:  LEXAPRO  
  
 lidocaine 5 % Commonly known as:  Joselito Monge Apply 2 patches to the affected area on your low back for 12 hours a day and remove for 12 hours a day. Indications: back pain  
  
 lithium carbonate 300 mg tablet Take 1 Tab by mouth daily. Indications: mood stabilization  
  
 mirtazapine 30 mg tablet Commonly known as:  Lonne Almaer * pantoprazole 20 mg tablet Commonly known as:  PROTONIX Take 1 Tab by mouth daily. * pantoprazole 40 mg tablet Commonly known as:  PROTONIX * prazosin 5 mg capsule Commonly known as:  MINIPRESS Take 2 mg by mouth nightly. * prazosin 2 mg capsule Commonly known as:  MINIPRESS  
  
 promethazine 25 mg tablet Commonly known as:  PHENERGAN  
TAKE ONE-HALF TO ONE TABLET BY MOUTH EVERY 6 HOURS AS NEEDED  
  
 sucralfate 100 mg/mL suspension Commonly known as:  Adah Dory Take 10 mL by mouth four (4) times daily. SUMAtriptan 100 mg tablet Commonly known as:  IMITREX  
TAKE ONE TABLET BY MOUTH AT ONSET OF HEADACHE. MAY REPEAT ONE TABLET AFTER 2 HOURS *MAXIMUM OF TWO TABLETS A DAY *  
  
 topiramate 50 mg tablet Commonly known as:  TOPAMAX Take 1 Tab by mouth nightly. trospium 20 mg tablet Commonly known as:  SANCTURA  
  
 ziprasidone 40 mg capsule Commonly known as:  Ritesh Pope Take 40 mg by mouth two (2) times daily (with meals). * Notice: This list has 6 medication(s) that are the same as other medications prescribed for you. Read the directions carefully, and ask your doctor or other care provider to review them with you. Prescriptions Sent to Pharmacy Refills  
 promethazine (PHENERGAN) 25 mg tablet 3 Sig: TAKE ONE-HALF TO ONE TABLET BY MOUTH EVERY 6 HOURS AS NEEDED Class: Normal  
 Pharmacy: [address], [address] [place] [address] #: [phone] Follow-up Instructions Return in about 6 months (around 1/9/2019). Patient Instructions Paris Liang 1726 What is a living will? A living will is a legal form you use to write down the kind of care you want at the end of your life. It is used by the health professionals who will treat you if you aren't able to decide for yourself. If you put your wishes in writing, your loved ones and others will know what kind of care you want. They won't need to guess. This can ease your mind and be helpful to others. A living will is not the same as an estate or property will. An estate will explains what you want to happen with your money and property after you die. Is a living will a legal document? A living will is a legal document. Each state has its own laws about living arnold. If you move to another state, make sure that your living will is legal in the state where you now live.  Or you might use a universal form that has been approved by many states. This kind of form can sometimes be completed and stored online. Your electronic copy will then be available wherever you have a connection to the Internet. In most cases, doctors will respect your wishes even if you have a form from a different state. · You don't need an  to complete a living will. But legal advice can be helpful if your state's laws are unclear, your health history is complicated, or your family can't agree on what should be in your living will. · You can change your living will at any time. Some people find that their wishes about end-of-life care change as their health changes. · In addition to making a living will, think about completing a medical power of  form. This form lets you name the person you want to make end-of-life treatment decisions for you (your \"health care agent\") if you're not able to. Many hospitals and nursing homes will give you the forms you need to complete a living will and a medical power of . · Your living will is used only if you can't make or communicate decisions for yourself anymore. If you become able to make decisions again, you can accept or refuse any treatment, no matter what you wrote in your living will. · Your state may offer an online registry. This is a place where you can store your living will online so the doctors and nurses who need to treat you can find it right away. What should you think about when creating a living will? Talk about your end-of-life wishes with your family members and your doctor. Let them know what you want. That way the people making decisions for you won't be surprised by your choices. Think about these questions as you make your living will: · Do you know enough about life support methods that might be used?  If not, talk to your doctor so you know what might be done if you can't breathe on your own, your heart stops, or you're unable to swallow. · What things would you still want to be able to do after you receive life-support methods? Would you want to be able to walk? To speak? To eat on your own? To live without the help of machines? · If you have a choice, where do you want to be cared for? In your home? At a hospital or nursing home? · Do you want certain Zoroastrian practices performed if you become very ill? · If you have a choice at the end of your life, where would you prefer to die? At home? In a hospital or nursing home? Somewhere else? · Would you prefer to be buried or cremated? · Do you want your organs to be donated after you die? What should you do with your living will? · Make sure that your family members and your health care agent have copies of your living will. · Give your doctor a copy of your living will to keep in your medical record. If you have more than one doctor, make sure that each one has a copy. · You may want to put a copy of your living will where it can be easily found. Where can you learn more? Go to http://daisy-millicent.info/. Enter E087 in the search box to learn more about \"Learning About Living Jettie Felt. \" Current as of: September 24, 2016 Content Version: 11.4 © 9986-5329 Healthwise, Incorporated. Care instructions adapted under license by [employer] (which disclaims liability or warranty for this information). If you have questions about a medical condition or this instruction, always ask your healthcare PRESCRIPTION REFILL POLICY [facility] Statement to Patients April 1, 2014 In an effort to ensure the large volume of patient prescription refills is processed in the most efficient and expeditious manner, we are asking our patients to assist us by calling your Pharmacy for all prescription refills, this will include also your  Mail Order Pharmacy.  The pharmacy will contact our office electronically to continue the refill process. Please do not wait until the last minute to call your pharmacy. We need at least 48 hours (2days) to fill prescriptions. We also encourage you to call your pharmacy before going to  your prescription to make sure it is ready. With regard to controlled substance prescription refill requests (narcotic refills) that need to be picked up at our office, we ask your cooperation by providing us with at least 72 hours (3days) notice that you will need a refill. We will not refill narcotic prescription refill requests after 4:00pm on any weekday, Monday through Thursday, or after 2:00pm on Fridays, or on the weekends. We encourage everyone to explore another way of getting your prescription refill request processed using [employer], our patient web portal through our electronic medical record system. [employer] is an efficient and effective way to communicate your medication request directly to the office and  downloadable as an sam on your smart phone . [employer] also features a review functionality that allows you to view your medication list as well as leave messages for your physician. Are you ready to get connected? If so please review the attatched instructions or speak to any of our staff to get you set up right away! Thank you so much for your cooperation. Should you have any questions please contact our Practice Administrator. The Physicians and Staff,  [facility] Patient Instruction Plan/ Result Policy If we have ordered testing for you, know that; \"NO NEWS IS GOOD NEWS! \" It is our policy that we know longer call patients with results, nor do we  give test results over the phone. We schedule follow up appointments so that your results can be discussed in person. This allows you to address any questions you have regarding the results.   If something of concern is revealed on your test, we will contact you to discuss the matter and if needed schedule a sooner follow up appointment. Additionally, results may be found by using the My Chart feature and one of our patient service representatives at the  can give you instructions on how to access this feature to utilize our electronic medical record system. Thank you for your understanding. professional. [address] any warranty or liability for your use of this information. Migraine Headache: Care Instructions Your Care Instructions Migraines are painful, throbbing headaches that often start on one side of the head. They may cause nausea and vomiting and make you sensitive to light, sound, or smell. Without treatment, migraines can last from 4 hours to a few days. Medicines can help prevent migraines or stop them after they have started. Your doctor can help you find which ones work best for you. Follow-up care is a key part of your treatment and safety. Be sure to make and go to all appointments, and call your doctor if you are having problems. It's also a good idea to know your test results and keep a list of the medicines you take. How can you care for yourself at home? · Do not drive if you have taken a prescription pain medicine. · Rest in a quiet, dark room until your headache is gone. Close your eyes, and try to relax or go to sleep. Don't watch TV or read. · Put a cold, moist cloth or cold pack on the painful area for 10 to 20 minutes at a time. Put a thin cloth between the cold pack and your skin. · Use a warm, moist towel or a heating pad set on low to relax tight shoulder and neck muscles. · Have someone gently massage your neck and shoulders. · Take your medicines exactly as prescribed. Call your doctor if you think you are having a problem with your medicine. You will get more details on the specific medicines your doctor prescribes. · Be careful not to take pain medicine more often than the instructions allow. You could get worse or more frequent headaches when the medicine wears off. To prevent migraines · Keep a headache diary so you can figure out what triggers your headaches. Avoiding triggers may help you prevent headaches. Record when each headache began, how long it lasted, and what the pain was like. (Was it throbbing, aching, stabbing, or dull?) Write down any other symptoms you had with the headache, such as nausea, flashing lights or dark spots, or sensitivity to bright light or loud noise. Note if the headache occurred near your period. List anything that might have triggered the headache. Triggers may include certain foods (chocolate, cheese, wine) or odors, smoke, bright light, stress, or lack of sleep. · If your doctor has prescribed medicine for your migraines, take it as directed. You may have medicine that you take only when you get a migraine and medicine that you take all the time to help prevent migraines. ¨ If your doctor has prescribed medicine for when you get a headache, take it at the first sign of a migraine, unless your doctor has given you other instructions. ¨ If your doctor has prescribed medicine to prevent migraines, take it exactly as prescribed. Call your doctor if you think you are having a problem with your medicine. · Find healthy ways to deal with stress. Migraines are most common during or right after stressful times. Take time to relax before and after you do something that has caused a migraine in the past. 
· Try to keep your muscles relaxed by keeping good posture. Check your jaw, face, neck, and shoulder muscles for tension. Try to relax them. When you sit at a desk, change positions often. And make sure to stretch for 30 seconds each hour. · Get plenty of sleep and exercise. · Eat meals on a regular schedule.  Avoid foods and drinks that often trigger migraines. These include chocolate, alcohol (especially red wine and port), aspartame, monosodium glutamate (MSG), and some additives found in foods (such as hot dogs, parrish, cold cuts, aged cheeses, and pickled foods). · Limit caffeine. Don't drink too much coffee, tea, or soda. But don't quit caffeine suddenly. That can also give you migraines. · Do not smoke or allow others to smoke around you. If you need help quitting, talk to your doctor about stop-smoking programs and medicines. These can increase your chances of quitting for good. · If you are taking birth control pills or hormone therapy, talk to your doctor about whether they are triggering your migraines. When should you call for help? Call 911 anytime you think you may need emergency care. For example, call if: 
? · You have signs of a stroke. These may include: 
¨ Sudden numbness, paralysis, or weakness in your face, arm, or leg, especially on only one side of your body. ¨ Sudden vision changes. ¨ Sudden trouble speaking. ¨ Sudden confusion or trouble understanding simple statements. ¨ Sudden problems with walking or balance. ¨ A sudden, severe headache that is different from past headaches. ?Call your doctor now or seek immediate medical care if: 
? · You have new or worse nausea and vomiting. ? · You have a new or higher fever. ? · Your headache gets much worse. ? Watch closely for changes in your health, and be sure to contact your doctor if: 
? · You are not getting better after 2 days (48 hours). Where can you learn more? Go to http://daisy-millicent.info/. Enter S952 in the search box to learn more about \"Migraine Headache: Care Instructions. \" Current as of: October 14, 2016 Content Version: 11.4 © 8078-0985 Healthwise, [employer]  Care instructions adapted under license by [employer] (which disclaims liability or warranty for this information). If you have questions about a medical condition or this instruction, always ask your healthcare professional. [address] any warranty or liability for your use of this information. Introducing [facility] SERVICES! Dear Rell Gonzales: Thank you for requesting a [employer] account. Our records indicate that you already have an active [employer] account. You can access your account anytime at [email or web address]. [email or web address] Did you know that you can access your hospital and ER discharge instructions at any time in [employer]? You can also review all of your test results from your hospital stay or ER visit. Additional Information If you have questions, please visit the Frequently Asked Questions section of the [employer] website at [email or web address] Remember, [employer] is NOT to be used for urgent needs. For medical emergencies, dial 911. Now available from your iPhone and Android! Please provide this summary of care documentation to your next provider. Your primary care clinician is listed as Zoya Berger. If you have any questions after today's visit, please call [phone].

## 2018-07-09 NOTE — PROGRESS NOTES
Chief Complaint   Patient presents with    Head Pain     1. Have you been to the ER, urgent care clinic since your last visit? Hospitalized since your last visit? No    2. Have you seen or consulted any other health care providers outside of the [address] [place] [address] since your last visit? Include any pap smears or colon screening.  No

## 2018-07-09 NOTE — PATIENT INSTRUCTIONS
Learning About Rajinder Montesinos  What is a living will? A living will is a legal form you use to write down the kind of care you want at the end of your life. It is used by the health professionals who will treat you if you aren't able to decide for yourself. If you put your wishes in writing, your loved ones and others will know what kind of care you want. They won't need to guess. This can ease your mind and be helpful to others. A living will is not the same as an estate or property will. An estate will explains what you want to happen with your money and property after you die. Is a living will a legal document? A living will is a legal document. Each state has its own laws about living arnold. If you move to another state, make sure that your living will is legal in the state where you now live. Or you might use a universal form that has been approved by many states. This kind of form can sometimes be completed and stored online. Your electronic copy will then be available wherever you have a connection to the Internet. In most cases, doctors will respect your wishes even if you have a form from a different state. · You don't need an  to complete a living will. But legal advice can be helpful if your state's laws are unclear, your health history is complicated, or your family can't agree on what should be in your living will. · You can change your living will at any time. Some people find that their wishes about end-of-life care change as their health changes. · In addition to making a living will, think about completing a medical power of  form. This form lets you name the person you want to make end-of-life treatment decisions for you (your \"health care agent\") if you're not able to. Many hospitals and nursing homes will give you the forms you need to complete a living will and a medical power of .   · Your living will is used only if you can't make or communicate decisions for yourself anymore. If you become able to make decisions again, you can accept or refuse any treatment, no matter what you wrote in your living will. · Your state may offer an online registry. This is a place where you can store your living will online so the doctors and nurses who need to treat you can find it right away. What should you think about when creating a living will? Talk about your end-of-life wishes with your family members and your doctor. Let them know what you want. That way the people making decisions for you won't be surprised by your choices. Think about these questions as you make your living will:  · Do you know enough about life support methods that might be used? If not, talk to your doctor so you know what might be done if you can't breathe on your own, your heart stops, or you're unable to swallow. · What things would you still want to be able to do after you receive life-support methods? Would you want to be able to walk? To speak? To eat on your own? To live without the help of machines? · If you have a choice, where do you want to be cared for? In your home? At a hospital or nursing home? · Do you want certain Latter-day practices performed if you become very ill? · If you have a choice at the end of your life, where would you prefer to die? At home? In a hospital or nursing home? Somewhere else? · Would you prefer to be buried or cremated? · Do you want your organs to be donated after you die? What should you do with your living will? · Make sure that your family members and your health care agent have copies of your living will. · Give your doctor a copy of your living will to keep in your medical record. If you have more than one doctor, make sure that each one has a copy. · You may want to put a copy of your living will where it can be easily found. Where can you learn more? Go to http://daisy-millicent.info/.   Enter O464 in the search box to learn more about \"Learning About Living Elma. \"  Current as of: September 24, 2016  Content Version: 11.4  © 7346-0104 Healthwise, [employer] ([email or web address]). Care instructions adapted under license by [employer] (which disclaims liability or warranty for this information). If you have questions about a medical condition or this instruction, always ask your healthcare [address] [place] [address] Neu[facility] Clinic   Statement to Patients  April 1, 2014      In an effort to ensure the large volume of patient prescription refills is processed in the most efficient and expeditious manner, we are asking our patients to assist us by calling your Pharmacy for all prescription refills, this will include also your  Mail Order Pharmacy. The pharmacy will contact our office electronically to continue the refill process. Please do not wait until the last minute to call your pharmacy. We need at least 48 hours (2days) to fill prescriptions. We also encourage you to call your pharmacy before going to  your prescription to make sure it is ready. With regard to controlled substance prescription refill requests (narcotic refills) that need to be picked up at our office, we ask your cooperation by providing us with at least 72 hours (3days) notice that you will need a refill. We will not refill narcotic prescription refill requests after 4:00pm on any weekday, Monday through Thursday, or after 2:00pm on Fridays, or on the weekends. We encourage everyone to explore another way of getting your prescription refill request processed using [employer], our patient web portal through our electronic medical record system. [employer] is an efficient and effective way to communicate your medication request directly to the office and  downloadable as an sam on your smart phone . [employer] also features a review functionality that allows you to view your medication list as well as leave messages for your physician.  Are you ready to get connected? If so please review the attatched instructions or speak to any of our staff to get you set up right away! Thank you so much for your cooperation. Should you have any questions please contact our Practice Administrator. The Physicians and Staff,  [facility]   Patient Instruction Plan/ Result Policy    If we have ordered testing for you, know that; [facility] NEWS IS GOOD NEWS! \" It is our policy that we know longer call patients with results, nor do we  give test results over the phone. We schedule follow up appointments so that your results can be discussed in person. This allows you to address any questions you have regarding the results. If something of concern is revealed on your test, we will contact you to discuss the matter and if needed schedule a sooner follow up appointment. Additionally, results may be found by using the My Chart feature and one of our patient service representatives at the  can give you instructions on how to access this feature to utilize our electronic medical record system. Thank you for your understanding. professional. [address] any warranty or liability for your use of this information. Migraine Headache: Care Instructions  Your Care Instructions  Migraines are painful, throbbing headaches that often start on one side of the head. They may cause nausea and vomiting and make you sensitive to light, sound, or smell. Without treatment, migraines can last from 4 hours to a few days. Medicines can help prevent migraines or stop them after they have started. Your doctor can help you find which ones work best for you. Follow-up care is a key part of your treatment and safety. Be sure to make and go to all appointments, and call your doctor if you are having problems. It's also a good idea to know your test results and keep a list of the medicines you take.   How can you care for yourself at home?  · Do not drive if you have taken a prescription pain medicine. · Rest in a quiet, dark room until your headache is gone. Close your eyes, and try to relax or go to sleep. Don't watch TV or read. · Put a cold, moist cloth or cold pack on the painful area for 10 to 20 minutes at a time. Put a thin cloth between the cold pack and your skin. · Use a warm, moist towel or a heating pad set on low to relax tight shoulder and neck muscles. · Have someone gently massage your neck and shoulders. · Take your medicines exactly as prescribed. Call your doctor if you think you are having a problem with your medicine. You will get more details on the specific medicines your doctor prescribes. · Be careful not to take pain medicine more often than the instructions allow. You could get worse or more frequent headaches when the medicine wears off. To prevent migraines  · Keep a headache diary so you can figure out what triggers your headaches. Avoiding triggers may help you prevent headaches. Record when each headache began, how long it lasted, and what the pain was like. (Was it throbbing, aching, stabbing, or dull?) Write down any other symptoms you had with the headache, such as nausea, flashing lights or dark spots, or sensitivity to bright light or loud noise. Note if the headache occurred near your period. List anything that might have triggered the headache. Triggers may include certain foods (chocolate, cheese, wine) or odors, smoke, bright light, stress, or lack of sleep. · If your doctor has prescribed medicine for your migraines, take it as directed. You may have medicine that you take only when you get a migraine and medicine that you take all the time to help prevent migraines. ¨ If your doctor has prescribed medicine for when you get a headache, take it at the first sign of a migraine, unless your doctor has given you other instructions.   ¨ If your doctor has prescribed medicine to prevent migraines, take it exactly as prescribed. Call your doctor if you think you are having a problem with your medicine. · Find healthy ways to deal with stress. Migraines are most common during or right after stressful times. Take time to relax before and after you do something that has caused a migraine in the past.  · Try to keep your muscles relaxed by keeping good posture. Check your jaw, face, neck, and shoulder muscles for tension. Try to relax them. When you sit at a desk, change positions often. And make sure to stretch for 30 seconds each hour. · Get plenty of sleep and exercise. · Eat meals on a regular schedule. Avoid foods and drinks that often trigger migraines. These include chocolate, alcohol (especially red wine and port), aspartame, monosodium glutamate (MSG), and some additives found in foods (such as hot dogs, parrish, cold cuts, aged cheeses, and pickled foods). · Limit caffeine. Don't drink too much coffee, tea, or soda. But don't quit caffeine suddenly. That can also give you migraines. · Do not smoke or allow others to smoke around you. If you need help quitting, talk to your doctor about stop-smoking programs and medicines. These can increase your chances of quitting for good. · If you are taking birth control pills or hormone therapy, talk to your doctor about whether they are triggering your migraines. When should you call for help? Call 911 anytime you think you may need emergency care. For example, call if:  ? · You have signs of a stroke. These may include:  ¨ Sudden numbness, paralysis, or weakness in your face, arm, or leg, especially on only one side of your body. ¨ Sudden vision changes. ¨ Sudden trouble speaking. ¨ Sudden confusion or trouble understanding simple statements. ¨ Sudden problems with walking or balance. ¨ A sudden, severe headache that is different from past headaches.    ?Call your doctor now or seek immediate medical care if:  ? · You have new or worse nausea and vomiting. ? · You have a new or higher fever. ? · Your headache gets much worse. ? Watch closely for changes in your health, and be sure to contact your doctor if:  ? · You are not getting better after 2 days (48 hours). Where can you learn more? Go to http://daisy-millicent.info/. Enter V039 in the search box to learn more about \"Migraine Headache: Care Instructions. \"  Current as of: October 14, 2016  Content Version: 11.4  © 4104-3129 [employer]. Care instructions adapted under license by [employer] (which disclaims liability or warranty for this information). If you have questions about a medical condition or this instruction, always ask your healthcare professional. [address] any warranty or liability for your use of this information.

## 2018-07-27 ENCOUNTER — OFFICE VISIT (OUTPATIENT)
Dept: NEUROLOGY | Age: 48
End: 2018-07-27

## 2018-07-27 VITALS
WEIGHT: 238 LBS | SYSTOLIC BLOOD PRESSURE: 110 MMHG | OXYGEN SATURATION: 98 % | DIASTOLIC BLOOD PRESSURE: 70 MMHG | HEART RATE: 90 BPM | BODY MASS INDEX: 38.41 KG/M2

## 2018-07-27 DIAGNOSIS — G43.909 MIGRAINE WITHOUT STATUS MIGRAINOSUS, NOT INTRACTABLE, UNSPECIFIED MIGRAINE TYPE: ICD-10-CM

## 2018-07-27 RX ORDER — TOPIRAMATE 100 MG/1
100 TABLET, FILM COATED ORAL
Qty: 30 TAB | Refills: 5 | Status: SHIPPED | OUTPATIENT
Start: 2018-07-27 | End: 2018-11-23 | Stop reason: SDUPTHER

## 2018-07-27 RX ORDER — PROMETHAZINE HYDROCHLORIDE 50 MG/1
TABLET ORAL
Qty: 30 TAB | Refills: 3 | Status: SHIPPED | OUTPATIENT
Start: 2018-07-27 | End: 2019-01-04 | Stop reason: SDUPTHER

## 2018-07-27 NOTE — PROGRESS NOTES
Neurology Progress Note    Patient ID:  Sharri Farrell  [id number]  46 y.o.  1970      Subjective:   History:  Lita Kelly is a 50 y.o. female who  has a past medical history of Back pain; Borderline personality disorder; Dysthymic disorder; Headache; Insomnia, PTSD, PUD (peptic ulcer disease); and Schizoaffective disorder (HCC). who for the past 2-3 months, noted severe headache, R periorbital radiating to R temple and frontal area, 3-4/ week, lasting 4 hrs, stress seem to be a trigger, Imitrex works, has nausea, photophobia and phonophobia. Consideration include migraine, without visual auras, intractable which may be related to perimenopause and stress due to recent passing of father from a pedestrian accident. Patient also noted cognitive issues for 5 months described as jumbling up words concerning for mild cognitive impairment probably due to her psych medication and again stress from her dad's passing. Propranolol caused SOB and diarrhea. Zofran did not help.      Patient is now on Topamax 75 mg QHS but noted persistent headache for the past 8 days. Having nausea and phenergan 25 mg is not helping anymore.   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 MAY REPEAT ONE TABLET AFTER 2 HOURS *MAXIMUM OF TWO TABLETS A DAY * 9 Tab 5    escitalopram oxalate (LEXAPRO) 20 mg tablet       diazePAM (VALIUM) 10 mg tablet 10 mg two (2) times a day. Indications: anxiety      buPROPion SR (WELLBUTRIN SR) 150 mg SR tablet Take 150 mg by mouth every morning.  ziprasidone (GEODON) 40 mg capsule Take 40 mg by mouth two (2) times daily (with meals).  benztropine (COGENTIN) 1 mg tablet Take 1 mg by mouth two (2) times a day.  divalproex DR (DEPAKOTE) 250 mg tablet Take 250 mg by mouth two (2) times a day.  cloZAPine (CLOZARIL) 100 mg tablet Take 200 mg by mouth nightly. 2 tabs @ hs      lithium carbonate 300 mg tablet Take 1 Tab by mouth daily. Indications: mood stabilization (Patient taking differently: Take 300 mg by mouth two (2) times a day. take one twice a day  Indications: mood stabilization) 7 Tab 0    trospium (SANCTURA) 20 mg tablet       pantoprazole (PROTONIX) 40 mg tablet       sucralfate (CARAFATE) 100 mg/mL suspension Take 10 mL by mouth four (4) times daily. 414 mL 0    pantoprazole (PROTONIX) 20 mg tablet Take 1 Tab by mouth daily. 30 Tab 0    lidocaine (LIDODERM) 5 % Apply 2 patches to the affected area on your low back for 12 hours a day and remove for 12 hours a day. Indications: back pain 12 Each 0    prazosin (MINIPRESS) 5 mg capsule Take 2 mg by mouth nightly. No current facility-administered medications on file prior to visit. Imaging:    CT Results (recent):    Results from Hospital Encounter encounter on 06/29/17   CT HEAD WO CONT   Narrative EXAM:  CT HEAD WO CONT    INDICATION:   Headache, chronic, new features or neuro deficit    COMPARISON: 2010.     TECHNIQUE: Unenhanced CT of the head was performed using 5 mm images. Brain and  bone windows were generated. CT dose reduction was achieved through use of a  standardized protocol tailored for this examination and automatic exposure  control for dose modulation. Adaptive statistical iterative reconstruction  (ASIR) was utilized. FINDINGS:  There is no extra-axial fluid collection hemorrhage shift or masses her. Ventricles are normal in size. Impression impression: Negative. MRI Results (recent):  No results found for this or any previous visit. IR Results (recent):  No results found for this or any previous visit. VAS/US Results (recent):  No results found for this or any previous visit. Reviewed records in "[employer] [facility] [employer] and Oatmeal tab today    Lab Review     No visits with results within 3 Month(s) from this visit. Latest known visit with results is:    Admission on 03/12/2018, Discharged on 03/12/2018   Component Date Value Ref Range Status    Calcium, ionized (POC) 03/12/2018 1.21  1.12 - 1.32 MMOL/L Final    Sodium (POC) 03/12/2018 138  136 - 145 MMOL/L Final    Potassium (POC) 03/12/2018 4.5  3.5 - 5.1 MMOL/L Final    Chloride (POC) 03/12/2018 101  98 - 107 MMOL/L Final    CO2 (POC) 03/12/2018 28  21 - 32 MMOL/L Final    Anion gap (POC) 03/12/2018 14  10 - 20 mmol/L Final    PLEASE NOTE NEW REFERENCE RANGE    Glucose (POC) 03/12/2018 98  65 - 100 MG/DL Final    BUN (POC) 03/12/2018 18  9 - 20 MG/DL Final    Creatinine (POC) 03/12/2018 1.1  0.6 - 1.3 MG/DL Final    GFRAA, POC 03/12/2018 >60  >60 ml/min/1.73m2 Final    GFRNA, POC 03/12/2018 53* >60 ml/min/1.73m2 Final    Comment: Estimated GFR is calculated using the IDMS-traceable Modification of Diet in Renal Disease (MDRD) Study equation, reported for both  Americans (GFRAA) and non- Americans (GFRNA), and normalized to 1.73m2 body surface area. The physician must decide which value applies to the patient.   The MDRD study equation should only be used in individuals age 25 or older. It has not been validated for the following: pregnant women, patients with serious comorbid conditions, or on certain medications, or persons with extremes of body size, muscle mass, or nutritional status.  Hematocrit (POC) 03/12/2018 38  35.0 - 47.0 % Final    Comment 03/12/2018 Comment Not Indicated. Final         Objective:       Exam:  Visit Vitals    /70    Pulse 90    Wt 108 kg (238 lb)    SpO2 98%    BMI 38.41 kg/m2     Gen: Awake, alert, follows commands  Appropriate appearance, normal speech. Oriented to all spheres. No visual field defect on confrontation exam.  Full eyes movement, with no nystagmus, no diplopia, no ptosis. Normal gag and swallow. All remaining cranial nerves were normal  Motor function: 5/5 in all extremities  Sensory: intact to LT, PP and JPS  Good FTN and HTS   Gait: Normal    Assessment:       ICD-10-CM ICD-9-CM    1. Migraine without status migrainosus, not intractable, unspecified migraine type G43.909 346.90 topiramate (TOPAMAX) 100 mg tablet      promethazine (PHENERGAN) 50 mg tablet           Plan:   1. Will increase Topamax 100 mg QHS for migraine prevention  2. Given samples of Zembrace to break headache cycle. Otherwise, can take Imitrex 100 mg prn to try to abort headaches. 3. Switched back to Phenergan 25 mg prn for nausea  4. Continue headache diary and went through the list that can trigger headache (stress)      Follow-up Disposition:  Return in about 1 month (around 8/27/2018).           Luis Nguyen MD  Diplomate, American Board of Psychiatry and Neurology  Diplomate, Neuromuscular Medicine  Diplomate, American Board of Electrodiagnostic Medicine

## 2018-07-27 NOTE — PATIENT INSTRUCTIONS
[facility]   Statement to Patients  April 1, 2014      In an effort to ensure the large volume of patient prescription refills is processed in the most efficient and expeditious manner, we are asking our patients to assist us by calling your Pharmacy for all prescription refills, this will include also your  Mail Order Pharmacy. The pharmacy will contact our office electronically to continue the refill process. Please do not wait until the last minute to call your pharmacy. We need at least 48 hours (2days) to fill prescriptions. We also encourage you to call your pharmacy before going to  your prescription to make sure it is ready. With regard to controlled substance prescription refill requests (narcotic refills) that need to be picked up at our office, we ask your cooperation by providing us with at least 72 hours (3days) notice that you will need a refill. We will not refill narcotic prescription refill requests after 4:00pm on any weekday, Monday through Thursday, or after 2:00pm on Fridays, or on the weekends. We encourage everyone to explore another way of getting your prescription refill request processed using [employer], our patient web portal through our electronic medical record system. [employer] is an efficient and effective way to communicate your medication request directly to the office and  downloadable as an sam on your smart phone . [employer] also features a review functionality that allows you to view your medication list as well as leave messages for your physician. Are you ready to get connected? If so please review the attatched instructions or speak to any of our staff to get you set up right away! Thank you so much for your cooperation. Should you have any questions please contact our Practice Administrator.     The Physicians and Staff,  [address] [place] [address] Neurology Clinic

## 2018-07-27 NOTE — MR AVS SNAPSHOT
[facility] 
 
 
 [place] [address] [id number] [phone] Patient: Terrell Swanson MRN: [id number] :1970 Visit Information Date & Time Provider Department Dept. Phone Encounter #  
 2018  3:40 PM Sandee Barry MD [facility] Neurology [facility] [phone] [id number] Follow-up Instructions Return in about 1 month (around 2018). Your Appointments 2019  1:40 PM  
Follow Up with Sandee Barry MD  
[facility] Appt Note: headache [place]  [address] [id number] [phone]  
  
   
 [place] [address] [address] [address] Upcoming Health Maintenance Date Due Pneumococcal 19-64 Highest Risk (3 of 3 - PCV13) 2018 BREAST CANCER SCRN MAMMOGRAM 7/10/2018 FOBT Q 1 YEAR AGE 50-75 7/10/2018 Influenza Age 5 to Adult 2018 PAP AKA CERVICAL CYTOLOGY 2020 DTaP/Tdap/Td series (2 - Td) 2027 Allergies as of 2018  Review Complete On: 2018 By: Brice Ibrahim LPN Severity Noted Reaction Type Reactions Penicillin G High 10/01/2012   Systemic Anaphylaxis Aspirin  10/01/2012   Side Effect Nausea and Vomiting Pt states she isn't allergic to ASA she just can't take  due to gastric ulcers. Current Immunizations  Reviewed on 3/17/2015 Name Date Influenza Vaccine PF 3/16/2015  5:12 AM  
 Pneumococcal Polysaccharide (PPSV-23) 3/16/2015  5:15 AM  
  
 Not reviewed this visit You Were Diagnosed With   
  
 Codes Comments Migraine without status migrainosus, not intractable, unspecified migraine type     ICD-10-CM: G43.909 ICD-9-CM: 346.90 Vitals BP Pulse Weight(growth percentile) SpO2 BMI OB Status 110/70 90 238 lb (108 kg) 98% 38.41 kg/m2 Having regular periods Smoking Status Former Smoker BMI and BSA Data Body Mass Index Body Surface Area  
 38.41 kg/m 2 2.24 m 2 Preferred Pharmacy Pharmacy Name Phone [facility] # [id number] - [place]el Guan [address] [place] [address] [phone] Your Updated Medication List  
  
   
This list is accurate as of 7/27/18  4:12 PM.  Always use your most recent med list.  
  
  
  
  
 benztropine 1 mg tablet Commonly known as:  COGENTIN Take 1 mg by mouth two (2) times a day. * buPROPion  mg SR tablet Commonly known as:  Indiana Palacios Take 150 mg by mouth every morning. * buPROPion  mg SR tablet Commonly known as:  WELLBUTRIN ZYBAN  
  
 cloZAPine 100 mg tablet Commonly known as:  CLOZARIL Take 200 mg by mouth nightly. 2 tabs @ hs  
  
 diazePAM 10 mg tablet Commonly known as:  VALIUM  
10 mg two (2) times a day. Indications: anxiety  
  
 divalproex  mg tablet Commonly known as:  DEPAKOTE Take 250 mg by mouth two (2) times a day. DULoxetine 60 mg capsule Commonly known as:  CYMBALTA  
  
 escitalopram oxalate 20 mg tablet Commonly known as:  LEXAPRO  
  
 lidocaine 5 % Commonly known as:  Jurline Nam Apply 2 patches to the affected area on your low back for 12 hours a day and remove for 12 hours a day. Indications: back pain  
  
 lithium carbonate 300 mg tablet Take 1 Tab by mouth daily. Indications: mood stabilization  
  
 mirtazapine 30 mg tablet Commonly known as:  Gautam Section * pantoprazole 20 mg tablet Commonly known as:  PROTONIX Take 1 Tab by mouth daily. * pantoprazole 40 mg tablet Commonly known as:  PROTONIX * prazosin 5 mg capsule Commonly known as:  MINIPRESS Take 2 mg by mouth nightly. * prazosin 2 mg capsule Commonly known as:  MINIPRESS  
  
 promethazine 50 mg tablet Commonly known as:  PHENERGAN  
TAKE ONE-HALF TO ONE TABLET BY MOUTH PRN for nausea/vomiting  
  
 sucralfate 100 mg/mL suspension Commonly known as:  Benna Christos Take 10 mL by mouth four (4) times daily. SUMAtriptan 100 mg tablet Commonly known as:  IMITREX  
TAKE ONE TABLET BY MOUTH AT ONSET OF HEADACHE. MAY REPEAT ONE TABLET AFTER 2 HOURS *MAXIMUM OF TWO TABLETS A DAY *  
  
 topiramate 100 mg tablet Commonly known as:  TOPAMAX Take 1 Tab by mouth nightly. trospium 20 mg tablet Commonly known as:  SANCTURA  
  
 ziprasidone 40 mg capsule Commonly known as:  Anita Lazier Take 40 mg by mouth two (2) times daily (with meals). * Notice: This list has 6 medication(s) that are the same as other medications prescribed for you. Read the directions carefully, and ask your doctor or other care provider to review them with you. Prescriptions Sent to Pharmacy Refills  
 topiramate (TOPAMAX) 100 mg tablet 5 Sig: Take 1 Tab by mouth nightly. Class: Normal  
 Pharmacy: [facility] # [address] [place] [address], [address] Ph #: [phone] Route: Oral  
 promethazine (PHENERGAN) 50 mg tablet 3 Sig: TAKE ONE-HALF TO ONE TABLET BY MOUTH PRN for nausea/vomiting Class: Normal  
 Pharmacy: [facility] # [address] [place] [address], [address] Ph #: [phone] Follow-up Instructions Return in about 1 month (around 8/27/2018). Patient Instructions PRESCRIPTION REFILL POLICY [facility] Statement to Patients April 1, 2014 In an effort to ensure the large volume of patient prescription refills is processed in the most efficient and expeditious manner, we are asking our patients to assist us by calling your Pharmacy for all prescription refills, this will include also your  Mail Order Pharmacy. The pharmacy will contact our office electronically to continue the refill process. Please do not wait until the last minute to call your pharmacy. We need at least 48 hours (2days) to fill prescriptions.  We also encourage you to call your pharmacy before going to  your prescription to make sure it is ready. With regard to controlled substance prescription refill requests (narcotic refills) that need to be picked up at our office, we ask your cooperation by providing us with at least 72 hours (3days) notice that you will need a refill. We will not refill narcotic prescription refill requests after 4:00pm on any weekday, Monday through Thursday, or after 2:00pm on Fridays, or on the weekends. We encourage everyone to explore another way of getting your prescription refill request processed using AgLocal, our patient web portal through our electronic medical record system. AgLocal is an efficient and effective way to communicate your medication request directly to the office and  downloadable as an sam on your smart phone . AgLocal also features a review functionality that allows you to view your medication list as well as leave messages for your physician. Are you ready to get connected? If so please review the attatched instructions or speak to any of our staff to get you set up right away! Thank you so much for your cooperation. Should you have any questions please contact our Practice Administrator. The Physicians and Staff,  [facility]! Dear Hailey Aguirre: Thank you for requesting a AgLocal account. Our records indicate that you already have an active AgLocal account. You can access your account anytime at [email or web address] Did you know that you can access your hospital and ER discharge instructions at any time in AgLocal? You can also review all of your test results from your hospital stay or ER visit. Additional Information If you have questions, please visit the Frequently Asked Questions section of the AgLocal website at [email or web address] Remember, AgLocal is NOT to be used for urgent needs.  For medical emergencies, dial 911. Now available from your iPhone and Android! Please provide this summary of care documentation to your next provider. Your primary care clinician is listed as Zoya Berger. If you have any questions after today's visit, please call [phone].

## 2018-07-27 NOTE — PROGRESS NOTES
[facility]   Statement to Patients  April 1, 2014      In an effort to ensure the large volume of patient prescription refills is processed in the most efficient and expeditious manner, we are asking our patients to assist us by calling your Pharmacy for all prescription refills, this will include also your  Mail Order Pharmacy. The pharmacy will contact our office electronically to continue the refill process. Please do not wait until the last minute to call your pharmacy. We need at least 48 hours (2days) to fill prescriptions. We also encourage you to call your pharmacy before going to  your prescription to make sure it is ready. With regard to controlled substance prescription refill requests (narcotic refills) that need to be picked up at our office, we ask your cooperation by providing us with at least 72 hours (3days) notice that you will need a refill. We will not refill narcotic prescription refill requests after 4:00pm on any weekday, Monday through Thursday, or after 2:00pm on Fridays, or on the weekends. We encourage everyone to explore another way of getting your prescription refill request processed using Passpack, our patient web portal through our electronic medical record system. Passpack is an efficient and effective way to communicate your medication request directly to the office and  downloadable as an sam on your smart phone . Passpack also features a review functionality that allows you to view your medication list as well as leave messages for your physician. Are you ready to get connected? If so please review the attatched instructions or speak to any of our staff to get you set up right away! Thank you so much for your cooperation. Should you have any questions please contact our Practice Administrator.     The Physicians and Staff,  [facility]

## 2018-07-30 ENCOUNTER — TELEPHONE (OUTPATIENT)
Dept: NEUROLOGY | Age: 48
End: 2018-07-30

## 2018-07-30 NOTE — TELEPHONE ENCOUNTER
----- Message from [facility] sent at 7/30/2018  8:03 AM EDT -----  Regarding: Dr. Eddie Green  Pt [phone] needs a refill for Imitrex pills or injections called into [employer] [facility] [employer] [phone]. Pt is down to her last pill.

## 2018-07-30 NOTE — TELEPHONE ENCOUNTER
----- Message from Esme Lancaster sent at 7/30/2018  3:18 PM EDT -----  Regarding: Dr David Summers refill  Pt (p) [phone],  [phone] pt is following up on message left this morning regarding an rx refill for her ematrix, said she needs it called in today, if possible    she is out of the medication.

## 2018-07-30 NOTE — TELEPHONE ENCOUNTER
----- Message from Chary Magallanes sent at 7/30/2018 10:46 AM EDT -----  Regarding: Dr. Ceci Almazan  The patient is requesting a call back from the doctor to discuss being completely out of Rx Imitrex.  (q)[phone]

## 2018-07-31 NOTE — TELEPHONE ENCOUNTER
----- Message from Stanley Arredondo sent at 7/31/2018  3:10 PM EDT -----  Regarding: Dr. Jonathan Lowery  Pt requested a call back regarding a poss error with medications prescribed today (stated Topamax was prescribed instead of Imitrex). Best contact [phone] or [phone].

## 2018-08-03 NOTE — TELEPHONE ENCOUNTER
----- Message from [place]ie Meza sent at 8/3/2018  3:03 PM EDT -----  Regarding: Dr. Eris Fuentes  Pt requesting to speak with the provider in regards to medication \"Imitrex\". Pt stated, she has been with out medication now for 5 days with no response from the office. Pt requested refill of medication 2 times and this is the second request to speak with provider in why medication is not being refilled. Pt requesting to speak with the provider/nurse in regards to this matter.    Best contact number [phone] alternated number [phone]

## 2018-08-28 ENCOUNTER — OFFICE VISIT (OUTPATIENT)
Dept: FAMILY MEDICINE CLINIC | Age: 48
End: 2018-08-28

## 2018-08-28 VITALS
BODY MASS INDEX: 37.28 KG/M2 | RESPIRATION RATE: 16 BRPM | DIASTOLIC BLOOD PRESSURE: 65 MMHG | SYSTOLIC BLOOD PRESSURE: 127 MMHG | WEIGHT: 232 LBS | HEIGHT: 66 IN | OXYGEN SATURATION: 97 % | HEART RATE: 83 BPM | TEMPERATURE: 98.8 F

## 2018-08-28 DIAGNOSIS — R10.84 GENERALIZED ABDOMINAL PAIN: Primary | ICD-10-CM

## 2018-08-28 PROBLEM — E66.01 SEVERE OBESITY (BMI 35.0-39.9): Status: ACTIVE | Noted: 2018-08-28

## 2018-08-28 RX ORDER — LORAZEPAM 1 MG/1
1 TABLET ORAL 2 TIMES DAILY
COMMUNITY
Start: 2018-08-23 | End: 2018-09-24

## 2018-08-28 NOTE — MR AVS SNAPSHOT
[address] [facility] 
 
 
 [address] [facility] 
[phone] Patient: Netta Dean MRN: [id number] :1970 Visit Information Date & Time Provider Department Dept. Phone Encounter #  
 2018 11:15 AM Prince Johnson  [address] [phone] [id number] Follow-up Instructions Return if symptoms worsen or fail to improve. Your Appointments 2018 11:00 AM  
New Patient with Jessica Oliveira MD  
[facility]) Appt Note: 1 month f/u Migraine [place] [address] [address] [phone]  
  
   
 [facility]  
  
    
 2019  1:40 PM  
Follow Up with Jessica Oliveira MD  
[facility]) Appt Note: headache [place] [address] [address] [phone]  
  
   
 [place] [address] [id number] [address] Upcoming Health Maintenance Date Due Pneumococcal 19-64 Highest Risk (3 of 3 - PCV13) 2018 BREAST CANCER SCRN MAMMOGRAM 7/10/2018 FOBT Q 1 YEAR AGE 50-75 7/10/2018 Influenza Age 5 to Adult 2018 PAP AKA CERVICAL CYTOLOGY 2020 DTaP/Tdap/Td series (2 - Td) 2027 Allergies as of 2018  Review Complete On: 2018 By: Prince Johnson NP Severity Noted Reaction Type Reactions Penicillin G High 10/01/2012   Systemic Anaphylaxis Aspirin  10/01/2012   Side Effect Nausea and Vomiting Pt states she isn't allergic to ASA she just can't take  due to gastric ulcers. Current Immunizations  Reviewed on 3/17/2015 Name Date Influenza Vaccine PF 3/16/2015  5:12 AM  
 Pneumococcal Polysaccharide (PPSV-23) 3/16/2015  5:15 AM  
  
 Not reviewed this visit You Were Diagnosed With   
  
 Codes Comments Generalized abdominal pain    -  Primary ICD-10-CM: R10.84 ICD-9-CM: 789.07 Vitals BP Pulse Temp Resp Height(growth percentile) Weight(growth percentile) 127/65 (BP 1 Location: Right arm, BP Patient Position: Sitting) 83 98.8 °F (37.1 °C) (Oral) 16 5' 6\" (1.676 m) 232 lb (105.2 kg) LMP SpO2 BMI OB Status Smoking Status 08/27/2018 (Exact Date) 97% 37.45 kg/m2 Having regular periods Former Smoker Vitals History BMI and BSA Data Body Mass Index Body Surface Area  
 37.45 kg/m 2 2.21 m 2 Preferred Pharmacy Pharmacy Name Phone [facility] [id number] - [place] [facility]", [address] [place] [address] [phone] Your Updated Medication List  
  
   
This list is accurate as of 8/28/18 11:38 AM.  Always use your most recent med list.  
  
  
  
  
 benztropine 1 mg tablet Commonly known as:  COGENTIN Take 1 mg by mouth two (2) times a day. cloZAPine 100 mg tablet Commonly known as:  CLOZARIL Take 200 mg by mouth nightly. 2 tabs @ hs  
  
 divalproex  mg tablet Commonly known as:  DEPAKOTE Take 250 mg by mouth two (2) times a day. DULoxetine 60 mg capsule Commonly known as:  CYMBALTA Take 60 mg by mouth daily. lithium carbonate 300 mg tablet Take 1 Tab by mouth daily. Indications: mood stabilization LORazepam 1 mg tablet Commonly known as:  ATIVAN Take 1 mg by mouth two (2) times a day. mirtazapine 30 mg tablet Commonly known as:  Magdalene Deeds Take 30 mg by mouth nightly. pantoprazole 40 mg tablet Commonly known as:  PROTONIX  
  
 prazosin 2 mg capsule Commonly known as:  MINIPRESS Take 2 mg by mouth nightly. promethazine 50 mg tablet Commonly known as:  PHENERGAN  
TAKE ONE-HALF TO ONE TABLET BY MOUTH PRN for nausea/vomiting  
  
 sucralfate 100 mg/mL suspension Commonly known as:  Ressie Flavin Take 10 mL by mouth four (4) times daily. SUMAtriptan 100 mg tablet Commonly known as:  IMITREX TAKE ONE TABLET BY MOUTH AT ONSET OF HEADACHE. MAY REPEAT ONE TABLET AFTER 2 HOURS *MAXIMUM OF TWO TABLETS A DAY *  
  
 topiramate 100 mg tablet Commonly known as:  TOPAMAX Take 1 Tab by mouth nightly. trospium 20 mg tablet Commonly known as:  Rose Madison Take 20 mg by mouth nightly. ziprasidone 40 mg capsule Commonly known as:  Zaynab Freshwater Take 40 mg by mouth nightly. We Performed the Following REFERRAL TO GASTROENTEROLOGY [VCC59 Custom] Follow-up Instructions Return if symptoms worsen or fail to improve. Referral Information Referral ID Referred By Referred To  
  
 [id number] Laura KEENE [facility] [address] [place], [address] [place] Visits Status Start Date End Date 1 New Request 8/28/18 8/28/19 If your referral has a status of pending review or denied, additional information will be sent to support the outcome of this decision. Patient Instructions Abdominal Pain: Care Instructions Your Care Instructions Abdominal pain has many possible causes. Some aren't serious and get better on their own in a few days. Others need more testing and treatment. If your pain continues or gets worse, you need to be rechecked and may need more tests to find out what is wrong. You may need surgery to correct the problem. Don't ignore new symptoms, such as fever, nausea and vomiting, urination problems, pain that gets worse, and dizziness. These may be signs of a more serious problem. Your doctor may have recommended a follow-up visit in the next 8 to 12 hours. If you are not getting better, you may need more tests or treatment. The doctor has checked you carefully, but problems can develop later. If you notice any problems or new symptoms, get medical treatment right away. Follow-up care is a key part of your treatment and safety.  Be sure to make and go to all appointments, and call your doctor if you are having problems. It's also a good idea to know your test results and keep a list of the medicines you take. How can you care for yourself at home? · Rest until you feel better. · To prevent dehydration, drink plenty of fluids, enough so that your urine is light yellow or clear like water. Choose water and other caffeine-free clear liquids until you feel better. If you have kidney, heart, or liver disease and have to limit fluids, talk with your doctor before you increase the amount of fluids you drink. · If your stomach is upset, eat mild foods, such as rice, dry toast or crackers, bananas, and applesauce. Try eating several small meals instead of two or three large ones. · Wait until 48 hours after all symptoms have gone away before you have spicy foods, alcohol, and drinks that contain caffeine. · Do not eat foods that are high in fat. · Avoid anti-inflammatory medicines such as aspirin, ibuprofen (Advil, Motrin), and naproxen (Aleve). These can cause stomach upset. Talk to your doctor if you take daily aspirin for another health problem. When should you call for help? Call 911 anytime you think you may need emergency care. For example, call if: 
  · You passed out (lost consciousness).  
  · You pass maroon or very bloody stools.  
  · You vomit blood or what looks like coffee grounds.  
  · You have new, severe belly pain.  
 Call your doctor now or seek immediate medical care if: 
  · Your pain gets worse, especially if it becomes focused in one area of your belly.  
  · You have a new or higher fever.  
  · Your stools are black and look like tar, or they have streaks of blood.  
  · You have unexpected vaginal bleeding.  
  · You have symptoms of a urinary tract infection. These may include: 
¨ Pain when you urinate. ¨ Urinating more often than usual. 
¨ Blood in your urine.   · You are dizzy or lightheaded, or you feel like you may faint.  
 Watch closely for changes in your health, and be sure to contact your doctor if: 
  · You are not getting better after 1 day (24 hours). Where can you learn more? Go to http://daisy-millicent.info/. Enter D002 in the search box to learn more about \"Abdominal Pain: Care Instructions. \" Current as of: November 20, 2017 Content Version: 11.7 © 3620-5100 [employer]. Care instructions adapted under license by [employer] (which disclaims liability or warranty for this information). If you have questions about a medical condition or this instruction, always ask your healthcare professional. [address] any warranty or liability for your use of this information. Introducing \[facility]\"" & HEALTH SERVICES! Dear Bina Pulido: Thank you for requesting a Secure64 account. Our records indicate that you already have an active Secure64 account. You can access your account anytime at [email or web address] Did you know that you can access your hospital and ER discharge instructions at any time in Secure64? You can also review all of your test results from your hospital stay or ER visit. Additional Information If you have questions, please visit the Frequently Asked Questions section of the Secure64 website at [email or web address] Remember, Secure64 is NOT to be used for urgent needs. For medical emergencies, dial 911. Now available from your iPhone and Android! Please provide this summary of care documentation to your next provider. Your primary care clinician is listed as Zoya Berger. If you have any questions after today's visit, please call [phone].

## 2018-08-28 NOTE — LETTER
8/28/2018 11:37 AM 
 
Ms. Hua Morgan [address] [place] [id number] To Whom It May Concern: 
 
Hua Morgan is currently under the care of [address] [place] [address]. She was seen in office on 8/28, for abdominal pain. Pain is noted to palpation all over abdomen. Pain is described as sharp. Pt should go to ER for evaluation and further assessment. If there are questions or concerns please have the patient contact our office. Sincerely, Jimmy Avelar NP

## 2018-08-28 NOTE — PROGRESS NOTES
Identified pt with two pt identifiers(name and ). Chief Complaint   Patient presents with    Nausea     reports recent med change last Wednesday from valium to ativan    Epigastric Pain     starts in stomach area and goes up into the rib area    Migraine     had migrain this morning she medicated herself for         Health Maintenance Due   Topic    Pneumococcal 19-64 Highest Risk (3 of 3 - PCV13)    BREAST CANCER SCRN MAMMOGRAM     FOBT Q 1 YEAR AGE 50-75     Influenza Age 5 to Adult        Wt Readings from Last 3 Encounters:   18 232 lb (105.2 kg)   18 238 lb (108 kg)   18 234 lb (106.1 kg)     Temp Readings from Last 3 Encounters:   18 98.8 °F (37.1 °C) (Oral)   18 98.3 °F (36.8 °C) (Oral)   18 98.4 °F (36.9 °C) (Oral)     BP Readings from Last 3 Encounters:   18 127/65   18 110/70   18 132/84     Pulse Readings from Last 3 Encounters:   18 83   18 90   18 92         Learning Assessment:  :     Learning Assessment 2013   PRIMARY LEARNER Patient Patient Patient   HIGHEST LEVEL OF EDUCATION - PRIMARY LEARNER  - - > 4 YEARS OF COLLEGE   BARRIERS PRIMARY LEARNER - - NONE   CO-LEARNER CAREGIVER - - No   PRIMARY LANGUAGE ENGLISH ENGLISH ENGLISH    NEED - - No   LEARNER PREFERENCE PRIMARY DEMONSTRATION READING LISTENING     - LISTENING -   ANSWERED BY patient patient self   RELATIONSHIP SELF SELF SELF   ASSESSMENT COMMENT - - no       Depression Screening:  :     PHQ over the last two weeks 2018   PHQ Not Done -   Little interest or pleasure in doing things Not at all   Feeling down, depressed, irritable, or hopeless Not at all   Total Score PHQ 2 0       Fall Risk Assessment:  :     Fall Risk Assessment, last 12 mths 2018   Able to walk? Yes   Fall in past 12 months?  No       Abuse Screening:  :     Abuse Screening Questionnaire 2018 2017 8/15/2016   Do you ever feel afraid of your partner? N N N   Are you in a relationship with someone who physically or mentally threatens you? N N N   Is it safe for you to go home? Cisco Choi           Coordination of Care Questionnaire:  :     1) Have you been to an emergency room, urgent care clinic since your last visit? no   Hospitalized since your last visit? no             2) Have you seen or consulted any other health care providers outside of [address] [place] [address] since your last visit? Yes, was last seen by psychiatrist one week ago    3) Do you have an Advance Directive on file? no  Are you interested in receiving information about Advance Directives? no    Patient is accompanied by self. Reviewed record in preparation for visit and have obtained necessary documentation. Medication reconciliation up to date and corrected with patient at this time. During check in upon adding in pt's new medication Ativan a high risk alert flag came up on the screen warning of drug interaction between clozapine and Ativan. Pt was advised of alert and possible side effects of which some listed could be fatal. She was advised of this warning and to contact the prescribing physician immediately to discuss. She verbalized understanding.

## 2018-08-28 NOTE — PATIENT INSTRUCTIONS

## 2018-08-28 NOTE — PROGRESS NOTES
HISTORY OF PRESENT ILLNESS  Amos Etienne is a 46 y.o. female. HPI  Pt presents with \"nausea and epigastric pain\"    Pt states that she is having abdominal trouble. She has a lot of nausea. Sharp, stabbing pains, that is in her abdomen and will radiate around her back. This started last night, all of a sudden  No vomiting, no diarrhea  No fever  She had a normal BM this morning  No blood noted in stool  No urinary symptoms    Pt had been dealing with uncontrolled GERD for some time, and was referred to GI months ago. She was scheduled for an endoscopy but she canceled this as she states that her symptoms improved on their own. Review of Systems   Constitutional: Negative for fever. HENT: Negative for congestion. Respiratory: Negative for cough. Gastrointestinal: Positive for abdominal pain, heartburn and nausea. Negative for blood in stool, constipation, diarrhea and vomiting. Genitourinary: Negative for dysuria, frequency, hematuria and urgency. Physical Exam   Constitutional: She is oriented to person, place, and time. She appears well-developed and well-nourished. HENT:   Head: Normocephalic and atraumatic. Cardiovascular: Normal rate, regular rhythm and normal heart sounds. Pulmonary/Chest: Effort normal and breath sounds normal.   Abdominal: Normal appearance and bowel sounds are normal. There is tenderness in the right upper quadrant, right lower quadrant, left upper quadrant and left lower quadrant. There is no rigidity, no guarding, no CVA tenderness, no tenderness at McBurney's point and negative Martell's sign. Neurological: She is alert and oriented to person, place, and time. Skin: Skin is warm and dry. Psychiatric: She has a normal mood and affect. Her behavior is normal.       ASSESSMENT and PLAN    ICD-10-CM ICD-9-CM    1. Generalized abdominal pain R10.84 789.07 REFERRAL TO GASTROENTEROLOGY     Informed patient that I am worried about her pain.   Should pain continue/worsen, she develop a fever, vomiting, diarrhea, etc, she should go straight to the ER. Educated about calling for an appointment with GI ASAP. Pt informed to return to office with worsening of symptoms, or PRN with any questions or concerns. Pt verbalizes understanding of plan of care and denies further questions or concerns at this time.

## 2018-08-29 ENCOUNTER — APPOINTMENT (OUTPATIENT)
Dept: CT IMAGING | Age: 48
End: 2018-08-29
Attending: EMERGENCY MEDICINE
Payer: MEDICAID

## 2018-08-29 ENCOUNTER — HOSPITAL ENCOUNTER (EMERGENCY)
Age: 48
Discharge: HOME | End: 2018-08-29
Attending: EMERGENCY MEDICINE
Payer: MEDICAID

## 2018-08-29 VITALS
BODY MASS INDEX: 36.71 KG/M2 | HEIGHT: 66 IN | SYSTOLIC BLOOD PRESSURE: 107 MMHG | WEIGHT: 228.4 LBS | DIASTOLIC BLOOD PRESSURE: 49 MMHG | RESPIRATION RATE: 14 BRPM | TEMPERATURE: 98.5 F | OXYGEN SATURATION: 97 % | HEART RATE: 92 BPM

## 2018-08-29 DIAGNOSIS — R10.13 ABDOMINAL PAIN, EPIGASTRIC: Primary | ICD-10-CM

## 2018-08-29 LAB
ALBUMIN SERPL-MCNC: 3.8 G/DL (ref 3.5–5)
ALBUMIN/GLOB SERPL: 1.1 {RATIO} (ref 1.1–2.2)
ALP SERPL-CCNC: 74 U/L (ref 45–117)
ALT SERPL-CCNC: 13 U/L (ref 12–78)
ANION GAP SERPL CALC-SCNC: 14 MMOL/L (ref 5–15)
AST SERPL-CCNC: 10 U/L (ref 15–37)
BASOPHILS # BLD: 0 K/UL (ref 0–0.1)
BASOPHILS NFR BLD: 0 % (ref 0–1)
BILIRUB SERPL-MCNC: 0.4 MG/DL (ref 0.2–1)
BUN SERPL-MCNC: 16 MG/DL (ref 6–20)
BUN/CREAT SERPL: 12 (ref 12–20)
CALCIUM SERPL-MCNC: 8.9 MG/DL (ref 8.5–10.1)
CHLORIDE SERPL-SCNC: 103 MMOL/L (ref 97–108)
CO2 SERPL-SCNC: 22 MMOL/L (ref 21–32)
COMMENT, HOLDF: NORMAL
CREAT SERPL-MCNC: 1.33 MG/DL (ref 0.55–1.02)
DIFFERENTIAL METHOD BLD: NORMAL
EOSINOPHIL # BLD: 0 K/UL (ref 0–0.4)
EOSINOPHIL NFR BLD: 0 % (ref 0–7)
ERYTHROCYTE [DISTWIDTH] IN BLOOD BY AUTOMATED COUNT: 12.8 % (ref 11.5–14.5)
GLOBULIN SER CALC-MCNC: 3.4 G/DL (ref 2–4)
GLUCOSE SERPL-MCNC: 103 MG/DL (ref 65–100)
HCT VFR BLD AUTO: 41.7 % (ref 35–47)
HGB BLD-MCNC: 13.6 G/DL (ref 11.5–16)
LIPASE SERPL-CCNC: 232 U/L (ref 73–393)
LYMPHOCYTES # BLD: 1.8 K/UL (ref 0.8–3.5)
LYMPHOCYTES NFR BLD: 18 % (ref 12–49)
MCH RBC QN AUTO: 32.2 PG (ref 26–34)
MCHC RBC AUTO-ENTMCNC: 32.6 G/DL (ref 30–36.5)
MCV RBC AUTO: 98.8 FL (ref 80–99)
MONOCYTES # BLD: 0.9 K/UL (ref 0–1)
MONOCYTES NFR BLD: 9 % (ref 5–13)
NEUTS SEG # BLD: 7.4 K/UL (ref 1.8–8)
NEUTS SEG NFR BLD: 73 % (ref 32–75)
PLATELET # BLD AUTO: 242 K/UL (ref 150–400)
PMV BLD AUTO: 10.4 FL (ref 8.9–12.9)
POTASSIUM SERPL-SCNC: 3.8 MMOL/L (ref 3.5–5.1)
PROT SERPL-MCNC: 7.2 G/DL (ref 6.4–8.2)
RBC # BLD AUTO: 4.22 M/UL (ref 3.8–5.2)
SAMPLES BEING HELD,HOLD: NORMAL
SODIUM SERPL-SCNC: 139 MMOL/L (ref 136–145)
WBC # BLD AUTO: 10.1 K/UL (ref 3.6–11)
XXWBCSUS: 0

## 2018-08-29 PROCEDURE — 74177 CT ABD & PELVIS W/CONTRAST: CPT

## 2018-08-29 PROCEDURE — 80053 COMPREHEN METABOLIC PANEL: CPT | Performed by: EMERGENCY MEDICINE

## 2018-08-29 PROCEDURE — 99283 EMERGENCY DEPT VISIT LOW MDM: CPT

## 2018-08-29 PROCEDURE — 36415 COLL VENOUS BLD VENIPUNCTURE: CPT | Performed by: EMERGENCY MEDICINE

## 2018-08-29 PROCEDURE — 83690 ASSAY OF LIPASE: CPT | Performed by: EMERGENCY MEDICINE

## 2018-08-29 PROCEDURE — 96361 HYDRATE IV INFUSION ADD-ON: CPT

## 2018-08-29 PROCEDURE — 96375 TX/PRO/DX INJ NEW DRUG ADDON: CPT

## 2018-08-29 PROCEDURE — 74011636320 HC RX REV CODE- 636/320: Performed by: EMERGENCY MEDICINE

## 2018-08-29 PROCEDURE — 74011250636 HC RX REV CODE- 250/636: Performed by: EMERGENCY MEDICINE

## 2018-08-29 PROCEDURE — 85025 COMPLETE CBC W/AUTO DIFF WBC: CPT | Performed by: EMERGENCY MEDICINE

## 2018-08-29 PROCEDURE — 74011000250 HC RX REV CODE- 250: Performed by: EMERGENCY MEDICINE

## 2018-08-29 PROCEDURE — 96374 THER/PROPH/DIAG INJ IV PUSH: CPT

## 2018-08-29 RX ORDER — ONDANSETRON 2 MG/ML
8 INJECTION INTRAMUSCULAR; INTRAVENOUS
Status: COMPLETED | OUTPATIENT
Start: 2018-08-29 | End: 2018-08-29

## 2018-08-29 RX ORDER — FAMOTIDINE 10 MG/ML
20 INJECTION INTRAVENOUS
Status: COMPLETED | OUTPATIENT
Start: 2018-08-29 | End: 2018-08-29

## 2018-08-29 RX ORDER — MORPHINE SULFATE 4 MG/ML
4 INJECTION, SOLUTION INTRAMUSCULAR; INTRAVENOUS ONCE
Status: COMPLETED | OUTPATIENT
Start: 2018-08-29 | End: 2018-08-29

## 2018-08-29 RX ORDER — OMEPRAZOLE 20 MG/1
20 CAPSULE, DELAYED RELEASE ORAL DAILY
Qty: 30 CAP | Refills: 0 | Status: SHIPPED | OUTPATIENT
Start: 2018-08-29 | End: 2018-09-24

## 2018-08-29 RX ORDER — DICYCLOMINE HYDROCHLORIDE 20 MG/1
20 TABLET ORAL
Qty: 20 TAB | Refills: 0 | Status: SHIPPED | OUTPATIENT
Start: 2018-08-29 | End: 2018-09-24

## 2018-08-29 RX ADMIN — IOPAMIDOL 75 ML: 755 INJECTION, SOLUTION INTRAVENOUS at 13:41

## 2018-08-29 RX ADMIN — SODIUM CHLORIDE 1000 ML: 900 INJECTION, SOLUTION INTRAVENOUS at 12:42

## 2018-08-29 RX ADMIN — ONDANSETRON 8 MG: 2 INJECTION, SOLUTION INTRAMUSCULAR; INTRAVENOUS at 12:45

## 2018-08-29 RX ADMIN — MORPHINE SULFATE 4 MG: 4 INJECTION, SOLUTION INTRAMUSCULAR; INTRAVENOUS at 12:46

## 2018-08-29 RX ADMIN — FAMOTIDINE 20 MG: 10 INJECTION, SOLUTION INTRAVENOUS at 12:43

## 2018-08-29 NOTE — DISCHARGE INSTRUCTIONS

## 2018-08-29 NOTE — ED TRIAGE NOTES
Pt ambulatory to treatment area with c/o \"severe mid abdominal pain and nausea that started 3 days ago. \"  Pt states \"have had ulcers before so this may be an ulcer. \"  Pt denies fevers, vomiting, diarrhea. Pt reports taking pepto-bismal and phenergan without relief.

## 2018-08-29 NOTE — ED PROVIDER NOTES
HPI Comments: The patient has a 3 day history of epigastric abdominal pain and nausea. She states that she has had ulcers in the past and she believes her symptoms are due to to an ulcer. The pain it is made worse by eating, and occasionally radiates to her back. She is status post cholecystectomy many years ago. She denies vomiting, fever, chest pain, lower abdominal pain or other complaints. Patient is a 46 y.o. female presenting with abdominal pain. Abdominal Pain Associated symptoms include nausea and back pain. Pertinent negatives include no fever, no diarrhea, no vomiting, no dysuria, no headaches and no chest pain. Past Medical History:  
Diagnosis Date  Back pain  Borderline personality disorder  Dysthymic disorder  Headache  Insomnia, unspecified  Post traumatic stress disorder (PTSD)  Psychosis  PUD (peptic ulcer disease)  Schizoaffective disorder ([facility] [address] Past Surgical History:  
Procedure Laterality Date  HX CHOLECYSTECTOMY    
 2012 [address] Ulcer surgery of stomach Family History:  
Problem Relation Age of Onset  Hypertension Father  Psychiatric Disorder Father ETOH  Coronary Artery Disease Father [facility] Arthritis-osteo Mother  Thyroid Disease Mother  Stroke Maternal Grandmother  Malignant Hyperthermia Neg Hx  Pseudocholinesterase Deficiency Neg Hx  Delayed Awakening Neg Hx  Post-op Nausea/Vomiting Neg Hx  Emergence Delirium Neg Hx  Post-op Cognitive Dysfunction Neg Hx   
 Other Neg Hx Social History Social History  Marital status: SINGLE Spouse name: N/A  
 Number of children: N/A  
 Years of education: N/A Occupational History  Not on file. Social History Main Topics  Smoking status: Former Smoker Packs/day: 1.00 Years: 1.50 Types: Cigarettes  Smokeless tobacco: Never Used  Alcohol use No  
 Drug use:  No  
 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  
 
Other Topics Concern  Not on file Social History Narrative ALLERGIES: Penicillin g and Aspirin Review of Systems Constitutional: Negative for fever. Respiratory: Negative for cough, shortness of breath and wheezing. Cardiovascular: Negative for chest pain and leg swelling. Gastrointestinal: Positive for abdominal pain and nausea. Negative for diarrhea and vomiting. Genitourinary: Negative for dysuria. Musculoskeletal: Positive for back pain. Negative for neck stiffness. Skin: Negative for rash. Neurological: Negative. Negative for syncope and headaches. Psychiatric/Behavioral: Negative for confusion. All other systems reviewed and are negative. Vitals:  
 08/29/18 1209 BP: 139/76 Pulse: (!) 118 Resp: 17 Temp: 98.5 °F (36.9 °C) SpO2: 99% Weight: 103.6 kg (228 lb 6.3 oz) Height: 5' 6\" (1.676 m) Physical Exam  
Constitutional: She appears well-developed and well-nourished. No distress. HENT:  
Head: Normocephalic. Eyes: Pupils are equal, round, and reactive to light. Neck: Normal range of motion. Cardiovascular: Normal rate and regular rhythm. No murmur heard. Pulmonary/Chest: Effort normal and breath sounds normal. No respiratory distress. Abdominal: Soft. There is tenderness. ttp in epigastric area. Musculoskeletal: Normal range of motion. She exhibits no edema. Neurological: She is alert. She has normal strength. No cranial nerve deficit. Skin: Skin is warm and dry. Psychiatric: She has a normal mood and affect. Her behavior is normal.  
Nursing note and vitals reviewed. MDM 
 
 
ED Course Procedures

## 2018-09-24 ENCOUNTER — OFFICE VISIT (OUTPATIENT)
Dept: FAMILY MEDICINE CLINIC | Age: 48
End: 2018-09-24

## 2018-09-24 VITALS
OXYGEN SATURATION: 99 % | SYSTOLIC BLOOD PRESSURE: 102 MMHG | DIASTOLIC BLOOD PRESSURE: 68 MMHG | RESPIRATION RATE: 18 BRPM | HEART RATE: 66 BPM | HEIGHT: 66 IN | WEIGHT: 227 LBS | TEMPERATURE: 98.7 F | BODY MASS INDEX: 36.48 KG/M2

## 2018-09-24 DIAGNOSIS — K52.9 GASTROENTERITIS: Primary | ICD-10-CM

## 2018-09-24 RX ORDER — ONDANSETRON 4 MG/1
4 TABLET, ORALLY DISINTEGRATING ORAL
Qty: 10 TAB | Refills: 0 | Status: SHIPPED | OUTPATIENT
Start: 2018-09-24 | End: 2018-10-29 | Stop reason: ALTCHOICE

## 2018-09-24 RX ORDER — DICYCLOMINE HYDROCHLORIDE 20 MG/1
20 TABLET ORAL
Qty: 20 TAB | Refills: 0 | Status: SHIPPED | OUTPATIENT
Start: 2018-09-24 | End: 2019-07-18 | Stop reason: ALTCHOICE

## 2018-09-24 RX ORDER — DIAZEPAM 10 MG/1
1 TABLET ORAL
COMMUNITY
Start: 2018-09-19 | End: 2018-10-29 | Stop reason: ALTCHOICE

## 2018-09-24 NOTE — MR AVS SNAPSHOT
[facility] 
 
 
 [address] [facility] 
[phone] Patient: Jamee Brown MRN: [id number] :1970 Visit Information Date & Time Provider Department Dept. Phone Encounter #  
 2018  1:15 PM Diego Schumacher [place] [phone] [id number] Follow-up Instructions Return if symptoms worsen or fail to improve. Your Appointments 2019  1:40 PM  
Follow Up with Rizwana Sotelo MD  
[facility]) Appt Note: headache [place]  [address] [place] [id number] [phone]  
  
   
 [place] [address] [id number] [address] Upcoming Health Maintenance Date Due Pneumococcal 19-64 Highest Risk (3 of 3 - PCV13) 2018 Shingrix Vaccine Age 50> (1 of 2) 7/10/2018 BREAST CANCER SCRN MAMMOGRAM 7/10/2018 FOBT Q 1 YEAR AGE 50-75 7/10/2018 Influenza Age 5 to Adult 10/1/2018* PAP AKA CERVICAL CYTOLOGY 2020 DTaP/Tdap/Td series (2 - Td) 2027 *Topic was postponed. The date shown is not the original due date. Allergies as of 2018  Review Complete On: 2018 By: Diego Schumacher MD  
  
 Severity Noted Reaction Type Reactions Penicillin G High 10/01/2012   Systemic Anaphylaxis Aspirin  10/01/2012   Side Effect Nausea and Vomiting Pt states she isn't allergic to ASA she just can't take  due to gastric ulcers. Current Immunizations  Reviewed on 3/17/2015 Name Date Influenza Vaccine PF 3/16/2015  5:12 AM  
 Pneumococcal Polysaccharide (PPSV-23) 3/16/2015  5:15 AM  
  
 Not reviewed this visit You Were Diagnosed With   
  
 Codes Comments Gastroenteritis    -  Primary ICD-10-CM: K52.9 ICD-9-CM: 558. 9 Vitals BP Pulse Temp Resp Height(growth percentile) Weight(growth percentile) 102/68 (BP 1 Location: Right arm, BP Patient Position: Sitting) 66 98.7 °F (37.1 °C) (Oral) 18 5' 6\" (1.676 m) 227 lb (103 kg) LMP SpO2 BMI OB Status Smoking Status 08/13/2018 99% 36.64 kg/m2 Having regular periods Former Smoker Vitals History BMI and BSA Data Body Mass Index Body Surface Area  
 36.64 kg/m 2 2.19 m 2 Preferred Pharmacy Pharmacy Name Phone [id number] [place] [address] [phone] Your Updated Medication List  
  
   
This list is accurate as of 9/24/18  1:35 PM.  Always use your most recent med list.  
  
  
  
  
 benztropine 1 mg tablet Commonly known as:  COGENTIN Take 1 mg by mouth two (2) times a day. cloZAPine 100 mg tablet Commonly known as:  CLOZARIL Take 200 mg by mouth nightly. 2 tabs @ hs  
  
 diazePAM 10 mg tablet Commonly known as:  VALIUM Take 1 Tab by mouth two (2) times daily as needed. dicyclomine 20 mg tablet Commonly known as:  BENTYL Take 1 Tab by mouth every six (6) hours as needed for up to 20 doses. divalproex  mg tablet Commonly known as:  DEPAKOTE Take 250 mg by mouth two (2) times a day. DULoxetine 60 mg capsule Commonly known as:  CYMBALTA Take 60 mg by mouth daily. lithium carbonate 300 mg tablet Take 1 Tab by mouth daily. Indications: mood stabilization  
  
 mirtazapine 30 mg tablet Commonly known as:  Rolan Ramirez Take 30 mg by mouth nightly. ondansetron 4 mg disintegrating tablet Commonly known as:  ZOFRAN ODT Take 1 Tab by mouth every eight (8) hours as needed for Nausea. prazosin 2 mg capsule Commonly known as:  MINIPRESS Take 2 mg by mouth nightly. promethazine 50 mg tablet Commonly known as:  PHENERGAN  
TAKE ONE-HALF TO ONE TABLET BY MOUTH PRN for nausea/vomiting SUMAtriptan 100 mg tablet Commonly known as:  IMITREX  
TAKE ONE TABLET BY MOUTH AT ONSET OF HEADACHE.  MAY REPEAT ONE TABLET AFTER 2 HOURS *MAXIMUM OF TWO TABLETS A DAY *  
  
 topiramate 100 mg tablet Commonly known as:  TOPAMAX Take 1 Tab by mouth nightly. ziprasidone 40 mg capsule Commonly known as:  Jolene Sidles Take 40 mg by mouth nightly. Prescriptions Sent to Pharmacy Refills  
 ondansetron (ZOFRAN ODT) 4 mg disintegrating tablet 0 Sig: Take 1 Tab by mouth every eight (8) hours as needed for Nausea. Class: Normal  
 Pharmacy: [facility] [id number] [place] [address], [address] Ph #: [phone] Route: Oral  
 dicyclomine (BENTYL) 20 mg tablet 0 Sig: Take 1 Tab by mouth every six (6) hours as needed for up to 20 doses. Class: Normal  
 Pharmacy: [facility] [id number] [place] [address], [address] Ph #: [phone] Route: Oral  
  
Follow-up Instructions Return if symptoms worsen or fail to improve. Patient Instructions Gastroenteritis: Care Instructions Your Care Instructions Gastroenteritis is an illness that may cause nausea, vomiting, and diarrhea. It is sometimes called \"stomach flu. \" It can be caused by bacteria or a virus. You will probably begin to feel better in 1 to 2 days. In the meantime, get plenty of rest and make sure you do not become dehydrated. Dehydration occurs when your body loses too much fluid. Follow-up care is a key part of your treatment and safety. Be sure to make and go to all appointments, and call your doctor if you are having problems. It's also a good idea to know your test results and keep a list of the medicines you take. How can you care for yourself at home? · If your doctor prescribed antibiotics, take them as directed. Do not stop taking them just because you feel better. You need to take the full course of antibiotics. · Drink plenty of fluids to prevent dehydration, enough so that your urine is light yellow or clear like water.  Choose water and other caffeine-free clear liquids until you feel better. If you have kidney, heart, or liver disease and have to limit fluids, talk with your doctor before you increase your fluid intake. · Drink fluids slowly, in frequent, small amounts, because drinking too much too fast can cause vomiting. · Begin eating mild foods, such as dry toast, yogurt, applesauce, bananas, and rice. Avoid spicy, hot, or high-fat foods, and do not drink alcohol or caffeine for a day or two. Do not drink milk or eat ice cream until you are feeling better. How to prevent gastroenteritis · Keep hot foods hot and cold foods cold. · Do not eat meats, dressings, salads, or other foods that have been kept at room temperature for more than 2 hours. · Use a thermometer to check your refrigerator. It should be between 34°F and 40°F. 
· Defrost meats in the refrigerator or microwave, not on the kitchen counter. · Keep your hands and your kitchen clean. Wash your hands, cutting boards, and countertops with hot soapy water frequently. · Cook meat until it is well done. · Do not eat raw eggs or uncooked sauces made with raw eggs. · Do not take chances. If food looks or tastes spoiled, throw it out. When should you call for help? Call 911 anytime you think you may need emergency care. For example, call if: 
  · You vomit blood or what looks like coffee grounds.  
  · You passed out (lost consciousness).  
  · You pass maroon or very bloody stools.  
 Call your doctor now or seek immediate medical care if: 
  · You have severe belly pain.  
  · You have signs of needing more fluids. You have sunken eyes, a dry mouth, and pass only a little dark urine.  
  · You feel like you are going to faint.  
  · You have increased belly pain that does not go away in 1 to 2 days.  
  · You have new or increased nausea, or you are vomiting.  
  · You have a new or higher fever.  
  · Your stools are black and tarlike or have streaks of blood.  Watch closely for changes in your health, and be sure to contact your doctor if: 
  · You are dizzy or lightheaded.  
  · You urinate less than usual, or your urine is dark yellow or brown.  
  · You do not feel better with each day that goes by. Where can you learn more? Go to http://daisy-millicent.info/. Enter N142 in the search box to learn more about \"Gastroenteritis: Care Instructions. \" Current as of: November 18, 2017 Content Version: 11.7 © 4866-2519 [employer]. Care instructions adapted under license by "[employer]" (which disclaims liability or warranty for this information). If you have questions about a medical condition or this instruction, always ask your healthcare professional. [address] any warranty or liability for your use of this information. Introducing [facility]! Dear Hieu Cat: Thank you for requesting a [employer] account. Our records indicate that you already have an active [employer] account. You can access your account anytime at [email or web address] Did you know that you can access your hospital and ER discharge instructions at any time in [employer]? You can also review all of your test results from your hospital stay or ER visit. Additional Information If you have questions, please visit the Frequently Asked Questions section of the [employer] website at [email or web address] Remember, [employer] is NOT to be used for urgent needs. For medical emergencies, dial 911. Now available from your iPhone and Android! Please provide this summary of care documentation to your next provider. Your primary care clinician is listed as Zoya Berger. If you have any questions after today's visit, please call [phone].

## 2018-09-24 NOTE — PATIENT INSTRUCTIONS
Gastroenteritis: Care Instructions  Your Care Instructions    Gastroenteritis is an illness that may cause nausea, vomiting, and diarrhea. It is sometimes called \"stomach flu. \" It can be caused by bacteria or a virus. You will probably begin to feel better in 1 to 2 days. In the meantime, get plenty of rest and make sure you do not become dehydrated. Dehydration occurs when your body loses too much fluid. Follow-up care is a key part of your treatment and safety. Be sure to make and go to all appointments, and call your doctor if you are having problems. It's also a good idea to know your test results and keep a list of the medicines you take. How can you care for yourself at home? · If your doctor prescribed antibiotics, take them as directed. Do not stop taking them just because you feel better. You need to take the full course of antibiotics. · Drink plenty of fluids to prevent dehydration, enough so that your urine is light yellow or clear like water. Choose water and other caffeine-free clear liquids until you feel better. If you have kidney, heart, or liver disease and have to limit fluids, talk with your doctor before you increase your fluid intake. · Drink fluids slowly, in frequent, small amounts, because drinking too much too fast can cause vomiting. · Begin eating mild foods, such as dry toast, yogurt, applesauce, bananas, and rice. Avoid spicy, hot, or high-fat foods, and do not drink alcohol or caffeine for a day or two. Do not drink milk or eat ice cream until you are feeling better. How to prevent gastroenteritis  · Keep hot foods hot and cold foods cold. · Do not eat meats, dressings, salads, or other foods that have been kept at room temperature for more than 2 hours. · Use a thermometer to check your refrigerator. It should be between 34°F and 40°F.  · Defrost meats in the refrigerator or microwave, not on the kitchen counter. · Keep your hands and your kitchen clean.  Wash your hands, cutting boards, and countertops with hot soapy water frequently. · Cook meat until it is well done. · Do not eat raw eggs or uncooked sauces made with raw eggs. · Do not take chances. If food looks or tastes spoiled, throw it out. When should you call for help? Call 911 anytime you think you may need emergency care. For example, call if:    · You vomit blood or what looks like coffee grounds.     · You passed out (lost consciousness).     · You pass maroon or very bloody stools.    Call your doctor now or seek immediate medical care if:    · You have severe belly pain.     · You have signs of needing more fluids. You have sunken eyes, a dry mouth, and pass only a little dark urine.     · You feel like you are going to faint.     · You have increased belly pain that does not go away in 1 to 2 days.     · You have new or increased nausea, or you are vomiting.     · You have a new or higher fever.     · Your stools are black and tarlike or have streaks of blood.    Watch closely for changes in your health, and be sure to contact your doctor if:    · You are dizzy or lightheaded.     · You urinate less than usual, or your urine is dark yellow or brown.     · You do not feel better with each day that goes by. Where can you learn more? Go to http://daisy-millicent.info/. Enter N142 in the search box to learn more about \"Gastroenteritis: Care Instructions. \"  Current as of: November 18, 2017  Content Version: 11.7  © 2964-1226 [employer]. Care instructions adapted under license by [employer] (which disclaims liability or warranty for this information). If you have questions about a medical condition or this instruction, always ask your healthcare professional. [place] [address] any warranty or liability for your use of this information.

## 2018-09-24 NOTE — PROGRESS NOTES
Identified pt with two pt identifiers(name and ). Chief Complaint   Patient presents with    Diarrhea     Had it all day yesterday but not today    Nausea     possible food poisoning. Began yesterday morning after eating a rare hamburger the night before.  Abdominal Pain     Is cramping and pain        Health Maintenance Due   Topic    Pneumococcal 19-64 Highest Risk (3 of 3 - PCV13)    Shingrix Vaccine Age 50> (1 of 2)    BREAST CANCER SCRN MAMMOGRAM     FOBT Q 1 YEAR AGE 50-75     Influenza Age 5 to Adult        Wt Readings from Last 3 Encounters:   18 227 lb (103 kg)   18 228 lb 6.3 oz (103.6 kg)   18 232 lb (105.2 kg)     Temp Readings from Last 3 Encounters:   18 98.7 °F (37.1 °C) (Oral)   18 98.5 °F (36.9 °C)   18 98.8 °F (37.1 °C) (Oral)     BP Readings from Last 3 Encounters:   18 102/68   18 107/49   18 127/65     Pulse Readings from Last 3 Encounters:   18 66   18 92   18 83         Learning Assessment:  :     Learning Assessment 2013   PRIMARY LEARNER Patient Patient Patient   HIGHEST LEVEL OF EDUCATION - PRIMARY LEARNER  - - > 4 YEARS OF COLLEGE   BARRIERS PRIMARY LEARNER - - NONE   CO-LEARNER CAREGIVER - - No   PRIMARY LANGUAGE ENGLISH ENGLISH ENGLISH    NEED - - No   LEARNER PREFERENCE PRIMARY DEMONSTRATION READING LISTENING     - LISTENING -   ANSWERED BY patient patient self   RELATIONSHIP SELF SELF SELF   ASSESSMENT COMMENT - - no       Depression Screening:  :     PHQ over the last two weeks 2018   PHQ Not Done -   Little interest or pleasure in doing things Not at all   Feeling down, depressed, irritable, or hopeless Not at all   Total Score PHQ 2 0       Fall Risk Assessment:  :     Fall Risk Assessment, last 12 mths 2018   Able to walk? Yes   Fall in past 12 months?  No       Abuse Screening:  :     Abuse Screening Questionnaire 2018 2017 8/15/2016   Do you ever feel afraid of your partner? N N N   Are you in a relationship with someone who physically or mentally threatens you? N N N   Is it safe for you to go home? Bin Anderson       Coordination of Care Questionnaire:  :     1) Have you been to an emergency room, urgent care clinic since your last visit? no   Hospitalized since your last visit? no             2) Have you seen or consulted any other health care providers outside of [address] [place] [address] since your last visit? no  (Include any pap smears or colon screenings in this section.)    3) Do you have an Advance Directive on file? no  Are you interested in receiving information about Advance Directives? no    Reviewed record in preparation for visit and have obtained necessary documentation. Medication reconciliation up to date and corrected with patient at this time.

## 2018-09-24 NOTE — PROGRESS NOTES
Subjective:      An Cuevas is a 46 y.o. female here for evaluation of possible food poisoning. Symptoms include lower abdominal cramping, nausea without vomiting, and diarrhea (x 1 day, stools were watery in consistency, non-bloody, reported to have ~20 stools yesterday). Onset of symptoms was a day ago and noticed after eating a raw hamburger the night before. Diarrhea has resolved. Denies fever, chills, recent travel, camping, recent antibiotic use. Treatment to date: Pepto Bismol, Phenergan for nausea without much improvement     Current Outpatient Prescriptions   Medication Sig Dispense Refill    diazePAM (VALIUM) 10 mg tablet Take 1 Tab by mouth two (2) times daily as needed.  SUMAtriptan (IMITREX) 100 mg tablet TAKE ONE TABLET BY MOUTH AT ONSET OF HEADACHE. MAY REPEAT ONE TABLET AFTER 2 HOURS *MAXIMUM OF TWO TABLETS A DAY * 9 Tab 5    topiramate (TOPAMAX) 100 mg tablet Take 1 Tab by mouth nightly. 30 Tab 5    promethazine (PHENERGAN) 50 mg tablet TAKE ONE-HALF TO ONE TABLET BY MOUTH PRN for nausea/vomiting 30 Tab 3    mirtazapine (REMERON) 30 mg tablet Take 30 mg by mouth nightly.  DULoxetine (CYMBALTA) 60 mg capsule Take 60 mg by mouth daily.  prazosin (MINIPRESS) 2 mg capsule Take 2 mg by mouth nightly.  ziprasidone (GEODON) 40 mg capsule Take 40 mg by mouth nightly.  benztropine (COGENTIN) 1 mg tablet Take 1 mg by mouth two (2) times a day.  divalproex DR (DEPAKOTE) 250 mg tablet Take 250 mg by mouth two (2) times a day.  cloZAPine (CLOZARIL) 100 mg tablet Take 200 mg by mouth nightly. 2 tabs @ hs      lithium carbonate 300 mg tablet Take 1 Tab by mouth daily. Indications: mood stabilization (Patient taking differently: Take 300 mg by mouth two (2) times a day.  take one twice a day  Indications: mood stabilization) 7 Tab 0       Allergies   Allergen Reactions    Penicillin G Anaphylaxis    Aspirin Nausea and Vomiting     Pt states she isn't allergic to ASA she just can't take  due to gastric ulcers. Past Medical History:   Diagnosis Date    Back pain     Borderline personality disorder     Dysthymic disorder     Headache     Insomnia, unspecified     Post traumatic stress disorder (PTSD)     Psychosis     PUD (peptic ulcer disease)     Schizoaffective disorder (HCC)        Social History   Substance Use Topics    Smoking status: Former Smoker     Packs/day: 1.00     Years: 1.50     Types: Cigarettes    Smokeless tobacco: Never Used    Alcohol use No        Review of Systems  Pertinent items are noted in HPI. Objective:     Visit Vitals    /68 (BP 1 Location: Right arm, BP Patient Position: Sitting)  Comment: Manual    Pulse 66    Temp 98.7 °F (37.1 °C) (Oral)  Comment: .  Resp 18    Ht 5' 6\" (1.676 m)    Wt 227 lb (103 kg)    LMP 08/13/2018    SpO2 99%    BMI 36.64 kg/m2      General appearance - alert, well appearing, and in no distress  Oropharyngx - mucous membranes moist, pharynx normal without lesions  Chest - clear to auscultation, no wheezes, rales or rhonchi, symmetric air entry, no tachypnea, retractions or cyanosis  Heart - normal rate, regular rhythm, normal S1, S2, no murmurs, rubs, clicks or gallops  Abdomen - soft, mild lower quadrant TTP without rebound or guarding, nondistended, no masses, normal bowel sounds     Assessment/Plan:   Sharri Farrell is a 46 y.o. female seen for:     1. Gastroenteritis:   - ondansetron (ZOFRAN ODT) 4 mg disintegrating tablet; Take 1 Tab by mouth every eight (8) hours as needed for Nausea. Dispense: 10 Tab; Refill: 0  - dicyclomine (BENTYL) 20 mg tablet; Take 1 Tab by mouth every six (6) hours as needed for up to 20 doses. Dispense: 20 Tab; Refill: 0  - push fluids   - signs/symptoms that would warrant reevaluation discussed     I have discussed the diagnosis with the patient and the intended plan as seen in the above orders.  The patient has received an after-visit summary and questions were answered concerning future plans. I have discussed medication side effects and warnings with the patient as well. Patient verbalizes understanding of plan of care and denies further questions or concerns at this time. Informed patient to return to the office if symptoms worsen or if new symptoms arise. Follow-up Disposition:  Return if symptoms worsen or fail to improve.

## 2018-09-25 RX ORDER — SULFAMETHOXAZOLE AND TRIMETHOPRIM 800; 160 MG/1; MG/1
1 TABLET ORAL 2 TIMES DAILY
Qty: 10 TAB | Refills: 0 | Status: SHIPPED | OUTPATIENT
Start: 2018-09-25 | End: 2018-09-30

## 2018-09-25 NOTE — TELEPHONE ENCOUNTER
Patient states that diarrhea is watery and is once every couple of hours. Patient denies fever, chills, and vomiting. Patient states that the abdominal pain is worse.

## 2018-09-25 NOTE — TELEPHONE ENCOUNTER
Will treat empirically with Bactrim. Cipro and azithromycin not prescribed due to drug interactions with her psychotropic medications. Return for worsening symptoms. Requested Prescriptions     Signed Prescriptions Disp Refills    trimethoprim-sulfamethoxazole (BACTRIM DS, SEPTRA DS) 160-800 mg per tablet 10 Tab 0     Sig: Take 1 Tab by mouth two (2) times a day for 5 days.      Authorizing Provider: Buffy Martell

## 2018-09-25 NOTE — TELEPHONE ENCOUNTER
Patient called and wanted to pass a message to Dr Elly Yung. Patient states, \"the diarrhea has come back and stomach pains and nausea are both worse than yesterday. \"     Best contact: [phone] or [phone]

## 2018-09-26 ENCOUNTER — TELEPHONE (OUTPATIENT)
Dept: FAMILY MEDICINE CLINIC | Age: 48
End: 2018-09-26

## 2018-09-26 NOTE — TELEPHONE ENCOUNTER
Patient called and stated that the diarrhea has gotten worse. She is asking if something can be called in for this. If so she would like for it to go to Bunny Kaye at [facility]. Please call patient to let her know if this can be done.  [phone] or [phone]

## 2018-10-07 ENCOUNTER — HOSPITAL ENCOUNTER (EMERGENCY)
Age: 48
Discharge: HOME | End: 2018-10-07
Attending: EMERGENCY MEDICINE
Payer: MEDICAID

## 2018-10-07 ENCOUNTER — APPOINTMENT (OUTPATIENT)
Dept: GENERAL RADIOLOGY | Age: 48
End: 2018-10-07
Attending: EMERGENCY MEDICINE
Payer: MEDICAID

## 2018-10-07 VITALS
BODY MASS INDEX: 37.2 KG/M2 | TEMPERATURE: 97.4 F | SYSTOLIC BLOOD PRESSURE: 112 MMHG | WEIGHT: 231.48 LBS | OXYGEN SATURATION: 96 % | HEART RATE: 92 BPM | DIASTOLIC BLOOD PRESSURE: 57 MMHG | RESPIRATION RATE: 16 BRPM | HEIGHT: 66 IN

## 2018-10-07 DIAGNOSIS — Z23 NEED FOR TDAP VACCINATION: ICD-10-CM

## 2018-10-07 DIAGNOSIS — S60.221A CONTUSION OF RIGHT HAND, INITIAL ENCOUNTER: Primary | ICD-10-CM

## 2018-10-07 PROCEDURE — 74011250636 HC RX REV CODE- 250/636: Performed by: EMERGENCY MEDICINE

## 2018-10-07 PROCEDURE — 90471 IMMUNIZATION ADMIN: CPT

## 2018-10-07 PROCEDURE — 73130 X-RAY EXAM OF HAND: CPT

## 2018-10-07 PROCEDURE — 99283 EMERGENCY DEPT VISIT LOW MDM: CPT

## 2018-10-07 PROCEDURE — 90715 TDAP VACCINE 7 YRS/> IM: CPT | Performed by: EMERGENCY MEDICINE

## 2018-10-07 RX ORDER — IBUPROFEN 600 MG/1
600 TABLET ORAL
Status: DISCONTINUED | OUTPATIENT
Start: 2018-10-07 | End: 2018-10-07 | Stop reason: HOSPADM

## 2018-10-07 RX ADMIN — TETANUS TOXOID, REDUCED DIPHTHERIA TOXOID AND ACELLULAR PERTUSSIS VACCINE, ADSORBED 0.5 ML: 5; 2.5; 8; 8; 2.5 SUSPENSION INTRAMUSCULAR at 13:43

## 2018-10-07 NOTE — ED PROVIDER NOTES
HPI Comments: R handed WF c/o 'was working in a car on Wednesday/ 'smacked my R hand (dorsal aspect) '; pain controlled since then w/ tylenol; pt denies HA, vison changes, diff swallowing, CP, SOB, Abd pain, F/Ch, N/V, D/Cons or other current systemic complaints Patient is a 46 y.o. female presenting with hand pain. The history is provided by the patient. Hand Pain This is a new problem. The current episode started more than 2 days ago ('did it on wednesday while working on a car'). The problem occurs constantly. The problem has been gradually worsening. The pain is present in the right hand. The quality of the pain is described as aching, pounding and constant. The pain is mild. Associated symptoms include stiffness. Pertinent negatives include no numbness, no tingling, no itching, no back pain and no neck pain. The symptoms are aggravated by palpation and activity. She has tried OTC pain medications for the symptoms. The treatment provided significant relief. There has been a history of trauma. denies Past Medical History:  
Diagnosis Date  Back pain  Borderline personality disorder ([address]  Dysthymic disorder  Headache  Insomnia, unspecified  Post traumatic stress disorder (PTSD)  Psychosis ([address]  PUD (peptic ulcer disease)  Schizoaffective disorder ([address] Past Surgical History:  
Procedure Laterality Date  HX CHOLECYSTECTOMY    
 2012 [address] Jessica Ulcer surgery of stomach Family History:  
Problem Relation Age of Onset  Hypertension Father  Psychiatric Disorder Father ETOH  Coronary Artery Disease Father Cristina Mcwilliamsingale Arthritis-osteo Mother  Thyroid Disease Mother  Stroke Maternal Grandmother  Malignant Hyperthermia Neg Hx  Pseudocholinesterase Deficiency Neg Hx  Delayed Awakening Neg Hx  Post-op Nausea/Vomiting Neg Hx  Emergence Delirium Neg Hx  Post-op Cognitive Dysfunction Neg Hx   
 Other Neg Hx Social History Social History  Marital status: SINGLE Spouse name: N/A  
 Number of children: N/A  
 Years of education: N/A Occupational History  Not on file. Social History Main Topics  Smoking status: Former Smoker Packs/day: 1.00 Years: 1.50 Types: Cigarettes  Smokeless tobacco: Never Used  Alcohol use No  
 Drug use: No  
   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  
 
Other Topics Concern  Not on file Social History Narrative ALLERGIES: Penicillin g and Aspirin Review of Systems Musculoskeletal: Positive for arthralgias, myalgias and stiffness. Negative for back pain and neck pain. Skin: Positive for color change. Negative for itching. Neurological: Negative for tingling and numbness. All other systems reviewed and are negative. Vitals:  
 10/07/18 1314 BP: 112/57 Pulse: 92 Resp: 16 Temp: 97.4 °F (36.3 °C) SpO2: 96% Weight: 105 kg (231 lb 7.7 oz) Height: 5' 6\" (1.676 m) Physical Exam  
Constitutional: She is oriented to person, place, and time. She appears well-developed and well-nourished. NAD, AxOx4, speaking in complete sentences HENT:  
Head: Normocephalic and atraumatic. Nose: Nose normal.  
Mouth/Throat: Oropharynx is clear and moist.  
Eyes: Conjunctivae and EOM are normal. Pupils are equal, round, and reactive to light. Right eye exhibits no discharge. Left eye exhibits no discharge. No scleral icterus. Neck: Normal range of motion. Neck supple. No JVD present. No tracheal deviation present. Cardiovascular: Normal rate, regular rhythm, normal heart sounds and intact distal pulses. Exam reveals no gallop and no friction rub. No murmur heard. Pulmonary/Chest: Effort normal and breath sounds normal. No respiratory distress. She has no wheezes. She has no rales. She exhibits no tenderness. Abdominal: Soft. Bowel sounds are normal. There is no tenderness. There is no rebound and no guarding. Genitourinary: No vaginal discharge found. Musculoskeletal: Normal range of motion. She exhibits tenderness. She exhibits no edema or deformity. R hand - noted swelling/ ecchymosis along dorasl/ thenar areas especially; normal ROM/  R = L; no s box ttp/ pt has distal motor/ CV/ Sensation grossly intact all 5 fingers Abrasions noted ; Neurological: She is alert and oriented to person, place, and time. She has normal reflexes. No cranial nerve deficit. She exhibits normal muscle tone. Coordination normal.  
Skin: Skin is warm and dry. No rash noted. No erythema. No pallor. Psychiatric: She has a normal mood and affect. Her behavior is normal. Thought content normal.  
Nursing note and vitals reviewed. [facility] 
 
 
ED Course Procedures 1:47 PM Pt 'doing better' agrees w/ plans;  
Shaila Kelly's  results have been reviewed with her. She has been counseled regarding her diagnosis. She verbally conveys understanding and agreement of the signs, symptoms, diagnosis, treatment and prognosis and additionally agrees to Call/ Arrange follow up as recommended with Dr. Remi Tavera, NP in 24 - 48 hours. She also agrees with the care-plan and conveys that all of her questions have been answered. I have also put together some discharge instructions for her that include: 1) educational information regarding their diagnosis, 2) how to care for their diagnosis at home, as well a 3) list of reasons why they would want to return to the ED prior to their follow-up appointment, should their condition change or for concerns.

## 2018-10-07 NOTE — Clinical Note
Thank you for allowing us to provide you with medical care today. We realize that you have many choices for your emergency care needs. We thank you for choosing [facility].  Please choose us in the future for any continued health care need s. We hope we addressed all of your medical concerns. We strive to provide excellent quality care in the Emergency Department. Anything less than excellent does not meet our expectations. The exam and treatment you received in the Emergency Depa rtment were for an emergent problem and are not intended as complete care. It is important that you follow up with a doctor, nurse practitioner, or [address] [place] [address] assistant for ongoing care. If your symptoms worsen or you do not improve as expected and you  are unable to reach your usual health care provider, you should return to the Emergency Department. We are available 24 hours a day. Take this sheet with you when you go to your follow-up visit. If you have any problem arranging the follow-up vis it, contact the Emergency Department immediately. Make an appointment your family doctor for follow up of this visit. Return to the ER if you are unable to be seen in a timely manner.

## 2018-10-07 NOTE — DISCHARGE INSTRUCTIONS
Hand Bruises: Care Instructions  Your Care Instructions  Bruises, or contusions, can happen as a result of an impact or fall. Most people think of a bruise as a black-and-blue spot. This happens when small blood vessels get torn and leak blood under the skin. The bruise may turn purplish black, reddish blue, or yellowish green as it heals. But bones and muscles can also get bruised. This may damage the hand but not cause a bruise that you can see. Most bruises aren't serious and will go away on their own in 2 to 4 weeks. But sometimes a more serious hand injury might not heal on its own. Tell your doctor if you have new symptoms or your injury is not getting better over time. You may have tests to see if you have bone or nerve damage. These tests may include X-rays, a CT scan, or an MRI. If you damaged bones or muscles, you may need more treatment. The doctor has checked you carefully, but problems can develop later. If you notice any problems or new symptoms, get medical treatment right away. Follow-up care is a key part of your treatment and safety. Be sure to make and go to all appointments, and call your doctor if you are having problems. It's also a good idea to know your test results and keep a list of the medicines you take. How can you care for yourself at home? · Put ice or a cold pack on the hand for 10 to 20 minutes at a time. Put a thin cloth between the ice and your skin. · Prop up your hand on a pillow when you ice it or anytime you sit or lie down during the next 3 days. Try to keep your hand above the level of your heart. This will help reduce swelling. · Be safe with medicines. Read and follow all instructions on the label. ¨ If the doctor gave you a prescription medicine for pain, take it as prescribed. ¨ If you are not taking a prescription pain medicine, ask your doctor if you can take an over-the-counter medicine.   · Be sure to follow your doctor's advice about moving and exercising your injured hand. When should you call for help? Call your doctor now or seek immediate medical care if:    · Your pain gets worse.     · You have new or worse swelling.     · You have tingling, weakness, or numbness in the area near the bruise.     · The area near the bruise is cold or pale.     · You have symptoms of infection, such as:  ¨ Increased pain, swelling, warmth, or redness. ¨ Red streaks leading from the area. ¨ Pus draining from the area. ¨ A fever.    Watch closely for changes in your health, and be sure to contact your doctor if:    · You do not get better as expected. Where can you learn more? Go to http://daisy556 Fitnessmillicent.info/. Enter L945 in the search box to learn more about \"Hand Bruises: Care Instructions. \"  Current as of: November 20, 2017  Content Version: 11.8  © 9528-8864 [employer]. Care instructions adapted under license by [employer] (which disclaims liability or warranty for this information). If you have questions about a medical condition or this instruction, always ask your healthcare professional. [place] [address] any warranty or liability for your use of this information. DTaP (Diphtheria, Tetanus, Pertussis) Vaccine: What You Need to Know  Why get vaccinated? Diphtheria, tetanus, and pertussis are serious diseases caused by bacteria. Diphtheria and pertussis are spread from person to person. Tetanus enters the body through cuts or wounds. DIPHTHERIA causes a thick covering in the back of the throat. · It can lead to breathing problems, paralysis, heart failure, and even death. TETANUS (Lockjaw) causes painful tightening of the muscles, usually all over the body. · It can lead to \"locking\" of the jaw so the victim cannot open his mouth or swallow. Tetanus leads to death in up to 2 out of 10 cases.   PERTUSSIS (Whooping Cough) causes coughing spells so bad that it is hard for infants to eat, drink, or breathe. These spells can last for weeks. · It can lead to pneumonia, seizures (jerking and staring spells), brain damage, and death. Diphtheria, tetanus, and pertussis vaccine (DTaP) can help prevent these diseases. Most children who are vaccinated with DTaP will be protected throughout childhood. Many more children would get these diseases if we stopped vaccinating. DTaP is a safer version of an older vaccine called DTP. DTP is no longer used in the [place]. Who should get DTaP vaccine and when? Children should get 5 doses of DTaP vaccine, one dose at each of the following ages:  · 2 months  · 4 months  · 6 months  · 15-18 months  · 4-6 years  DTaP may be given at the same time as other vaccines. Some children should not get DTaP vaccine or should wait. · Children with minor illnesses, such as a cold, may be vaccinated. But children who are moderately or severely ill should usually wait until they recover before getting DTaP vaccine. · Any child who had a life-threatening allergic reaction after a dose of DTaP should not get another dose. · Any child who suffered a brain or nervous system disease within 7 days after a dose of DTaP should not get another dose. · Talk with your doctor if your child:  Olena Alva Had a seizure or collapsed after a dose of DTaP. ¨ Cried non-stop for 3 hours or more after a dose of DTaP. ¨ Had a fever over 105°F after a dose of DTaP. Ask your doctor for more information. Some of these children should not get another dose of pertussis vaccine, but may get a vaccine without pertussis, called DT. Older children and adults  DTaP is not licensed for adolescents, adults, or children 9years of age and older. But older people still need protection. A vaccine called Tdap is similar to DTaP. A single dose of Tdap is recommended for people 11 through 59years of age. Another vaccine, called Td, protects against tetanus and diphtheria, but not pertussis.  It is recommended every 10 years. There are separate Vaccine Information Statements for these vaccines. What are the risks from DTaP vaccine? Getting diphtheria, tetanus, or pertussis disease is much riskier than getting DTaP vaccine. However, a vaccine, like any medicine, is capable of causing serious problems, such as severe allergic reactions. The risk of DTaP vaccine causing serious harm, or death, is extremely small. Mild Problems (Common)  · Fever (up to about 1 child in 4)  · Redness or swelling where the shot was given (up to about 1 child in 4)  · Soreness or tenderness where the shot was given (up to about 1 child in 4)  These problems occur more often after the 4th and 5th doses of the DTaP series than after earlier doses. Sometimes the 4th or 5th dose of DTaP vaccine is followed by swelling of the entire arm or leg in which the shot was given, lasting 1-7 days (up to about 1 child in 27). Other mild problems include:  · Fussiness (up to about 1 child in 3)  · Tiredness or poor appetite (up to about 1 child in 10)  · Vomiting (up to about 1 child in 48)  These problems generally occur 1-3 days after the shot. Moderate Problems (Uncommon)  · Seizure (jerking or staring) (about 1 child out of 14,000)  · Non-stop crying, for 3 hours or more (up to about 1 child out of 1,000)  · High fever, over 105°F (about 1 child out of 16,000)  Severe Problems (Very Rare)  · Serious allergic reaction (less than 1 out of a million doses)  · Several other severe problems have been reported after DTaP vaccine. These include:  ¨ Long-term seizures, coma, or lowered consciousness. ¨ Permanent brain damage. These are so rare it is hard to tell if they are caused by the vaccine. Controlling fever is especially important for children who have had seizures, for any reason. It is also important if another family member has had seizures.  You can reduce fever and pain by giving your child an aspirin-free pain reliever when the shot is given, and for the next 24 hours, following the package instructions. What if there is a serious reaction? What should I look for? · Look for anything that concerns you, such as signs of a severe allergic reaction, very high fever, or behavior changes. Signs of a severe allergic reaction can include hives, swelling of the face and throat, difficulty breathing, a fast heartbeat, dizziness, and weakness. These would start a few minutes to a few hours after the vaccination. What should I do? · If you think it is a severe allergic reaction or other emergency that can't wait, call 9-1-1 or get the person to the nearest hospital. Otherwise, call your doctor. · Afterward, the reaction should be reported to the Vaccine Adverse Event Reporting System (VAERS). Your doctor might file this report, or you can do it yourself through the VAERS web site at www.vaers. [employer].gov, or by calling 9-217.874.3744. VAERS is only for reporting reactions. They do not give medical advice. The National Vaccine Injury Compensation Program  The National Vaccine Injury Compensation Program (VICP) is a federal program that was created to compensate people who may have been injured by certain vaccines. Persons who believe they may have been injured by a vaccine can learn about the program and about filing a claim by calling 5-959.272.6794 or visiting the [employer] website at www.[facility].gov/vaccinecompensation. How can I learn more? · Ask your doctor. · Call your local or state health department. · Contact the Centers for Disease Control and Prevention (CDC):  ¨ Call 9-549.404.4459 (1-800-CDC-INFO) or  ¨ Visit CDC's website at www.cdc.gov/vaccines  Vaccine Information Statement  DTaP (Tetanus, Diphtheria, Pertussis ) Vaccine  (5/17/2007)  [address] [id number]  Department of Health and Human Services  Centers for Disease Control and Prevention  Many Vaccine Information Statements are available in [place] and other languages.  See www.immunize.org/vis. Muchas hojas de información sobre vacunas están disponibles en español y en otros idiomas. Visite www.immunize.org/vis. Care instructions adapted under license by [employer] (which disclaims liability or warranty for this information). If you have questions about a medical condition or this instruction, always ask your healthcare professional. [address] any warranty or liability for your use of this information.

## 2018-10-07 NOTE — ED TRIAGE NOTES
Pt presents to ED with c/o pain in right hand. Pt was working on car and was turning a wrench and caught her hand between the wrench and the car. There is swelling noted with small abrasion to dorsal aspect of right hand. The injury occurred 10/3/2018.

## 2018-10-29 ENCOUNTER — APPOINTMENT (OUTPATIENT)
Dept: GENERAL RADIOLOGY | Age: 48
End: 2018-10-29
Attending: EMERGENCY MEDICINE
Payer: MEDICAID

## 2018-10-29 ENCOUNTER — HOSPITAL ENCOUNTER (EMERGENCY)
Age: 48
Discharge: HOME | End: 2018-10-29
Attending: EMERGENCY MEDICINE
Payer: MEDICAID

## 2018-10-29 ENCOUNTER — APPOINTMENT (OUTPATIENT)
Dept: CT IMAGING | Age: 48
End: 2018-10-29
Attending: EMERGENCY MEDICINE
Payer: MEDICAID

## 2018-10-29 VITALS
RESPIRATION RATE: 18 BRPM | WEIGHT: 237.44 LBS | OXYGEN SATURATION: 99 % | BODY MASS INDEX: 38.16 KG/M2 | DIASTOLIC BLOOD PRESSURE: 64 MMHG | HEIGHT: 66 IN | TEMPERATURE: 98 F | HEART RATE: 62 BPM | SYSTOLIC BLOOD PRESSURE: 110 MMHG

## 2018-10-29 DIAGNOSIS — R07.9 ACUTE CHEST PAIN: Primary | ICD-10-CM

## 2018-10-29 DIAGNOSIS — R06.02 SOB (SHORTNESS OF BREATH): ICD-10-CM

## 2018-10-29 LAB
ALBUMIN SERPL-MCNC: 3.5 G/DL (ref 3.5–5)
ALBUMIN/GLOB SERPL: 1.2 {RATIO} (ref 1.1–2.2)
ALP SERPL-CCNC: 62 U/L (ref 45–117)
ALT SERPL-CCNC: 10 U/L (ref 12–78)
ANION GAP SERPL CALC-SCNC: 7 MMOL/L (ref 5–15)
AST SERPL-CCNC: 14 U/L (ref 15–37)
ATRIAL RATE: 71 BPM
BASOPHILS # BLD: 0 K/UL (ref 0–0.1)
BASOPHILS NFR BLD: 0 % (ref 0–1)
BILIRUB SERPL-MCNC: 0.3 MG/DL (ref 0.2–1)
BUN SERPL-MCNC: 12 MG/DL (ref 6–20)
BUN/CREAT SERPL: 11 (ref 12–20)
CALCIUM SERPL-MCNC: 8.4 MG/DL (ref 8.5–10.1)
CALCULATED P AXIS, ECG09: 51 DEGREES
CALCULATED R AXIS, ECG10: 27 DEGREES
CALCULATED T AXIS, ECG11: 50 DEGREES
CHLORIDE SERPL-SCNC: 104 MMOL/L (ref 97–108)
CO2 SERPL-SCNC: 28 MMOL/L (ref 21–32)
COMMENT, HOLDF: NORMAL
CREAT SERPL-MCNC: 1.1 MG/DL (ref 0.55–1.02)
DIAGNOSIS, 93000: NORMAL
DIFFERENTIAL METHOD BLD: ABNORMAL
EOSINOPHIL # BLD: 0 K/UL (ref 0–0.4)
EOSINOPHIL NFR BLD: 0 % (ref 0–7)
ERYTHROCYTE [DISTWIDTH] IN BLOOD BY AUTOMATED COUNT: 12.5 % (ref 11.5–14.5)
GLOBULIN SER CALC-MCNC: 3 G/DL (ref 2–4)
GLUCOSE SERPL-MCNC: 91 MG/DL (ref 65–100)
HCT VFR BLD AUTO: 36 % (ref 35–47)
HGB BLD-MCNC: 11.8 G/DL (ref 11.5–16)
LIPASE SERPL-CCNC: 153 U/L (ref 73–393)
LYMPHOCYTES # BLD: 2.4 K/UL (ref 0.8–3.5)
LYMPHOCYTES NFR BLD: 25 % (ref 12–49)
MCH RBC QN AUTO: 32.3 PG (ref 26–34)
MCHC RBC AUTO-ENTMCNC: 32.8 G/DL (ref 30–36.5)
MCV RBC AUTO: 98.6 FL (ref 80–99)
MONOCYTES # BLD: 0.8 K/UL (ref 0–1)
MONOCYTES NFR BLD: 8 % (ref 5–13)
NEUTS SEG # BLD: 6.2 K/UL (ref 1.8–8)
NEUTS SEG NFR BLD: 67 % (ref 32–75)
P-R INTERVAL, ECG05: 142 MS
PLATELET # BLD AUTO: 215 K/UL (ref 150–400)
PMV BLD AUTO: 9.9 FL (ref 8.9–12.9)
POTASSIUM SERPL-SCNC: 4 MMOL/L (ref 3.5–5.1)
PROT SERPL-MCNC: 6.5 G/DL (ref 6.4–8.2)
Q-T INTERVAL, ECG07: 416 MS
QRS DURATION, ECG06: 82 MS
QTC CALCULATION (BEZET), ECG08: 452 MS
RBC # BLD AUTO: 3.65 M/UL (ref 3.8–5.2)
SAMPLES BEING HELD,HOLD: NORMAL
SODIUM SERPL-SCNC: 139 MMOL/L (ref 136–145)
TROPONIN I BLD-MCNC: <0.04 NG/ML (ref 0–0.08)
TROPONIN I SERPL-MCNC: <0.05 NG/ML
VENTRICULAR RATE, ECG03: 71 BPM
WBC # BLD AUTO: 9.5 K/UL (ref 3.6–11)
XXWBCSUS: 0

## 2018-10-29 PROCEDURE — 71045 X-RAY EXAM CHEST 1 VIEW: CPT

## 2018-10-29 PROCEDURE — 99285 EMERGENCY DEPT VISIT HI MDM: CPT

## 2018-10-29 PROCEDURE — 36415 COLL VENOUS BLD VENIPUNCTURE: CPT | Performed by: EMERGENCY MEDICINE

## 2018-10-29 PROCEDURE — 83690 ASSAY OF LIPASE: CPT | Performed by: EMERGENCY MEDICINE

## 2018-10-29 PROCEDURE — 96374 THER/PROPH/DIAG INJ IV PUSH: CPT

## 2018-10-29 PROCEDURE — 84484 ASSAY OF TROPONIN QUANT: CPT | Performed by: EMERGENCY MEDICINE

## 2018-10-29 PROCEDURE — 74011250637 HC RX REV CODE- 250/637: Performed by: EMERGENCY MEDICINE

## 2018-10-29 PROCEDURE — 71275 CT ANGIOGRAPHY CHEST: CPT

## 2018-10-29 PROCEDURE — 93005 ELECTROCARDIOGRAM TRACING: CPT

## 2018-10-29 PROCEDURE — 85025 COMPLETE CBC W/AUTO DIFF WBC: CPT | Performed by: EMERGENCY MEDICINE

## 2018-10-29 PROCEDURE — 74011636320 HC RX REV CODE- 636/320: Performed by: EMERGENCY MEDICINE

## 2018-10-29 PROCEDURE — 74011250636 HC RX REV CODE- 250/636: Performed by: EMERGENCY MEDICINE

## 2018-10-29 PROCEDURE — 80053 COMPREHEN METABOLIC PANEL: CPT | Performed by: EMERGENCY MEDICINE

## 2018-10-29 RX ORDER — GUAIFENESIN 100 MG/5ML
324 LIQUID (ML) ORAL
Status: COMPLETED | OUTPATIENT
Start: 2018-10-29 | End: 2018-10-29

## 2018-10-29 RX ORDER — CLOZAPINE 25 MG/1
25 TABLET ORAL DAILY
COMMUNITY
End: 2019-09-18 | Stop reason: ALTCHOICE

## 2018-10-29 RX ORDER — KETOROLAC TROMETHAMINE 30 MG/ML
15 INJECTION, SOLUTION INTRAMUSCULAR; INTRAVENOUS
Status: COMPLETED | OUTPATIENT
Start: 2018-10-29 | End: 2018-10-29

## 2018-10-29 RX ADMIN — ASPIRIN 81 MG 324 MG: 81 TABLET ORAL at 14:50

## 2018-10-29 RX ADMIN — KETOROLAC TROMETHAMINE 15 MG: 30 INJECTION, SOLUTION INTRAMUSCULAR at 17:01

## 2018-10-29 RX ADMIN — IOPAMIDOL 100 ML: 755 INJECTION, SOLUTION INTRAVENOUS at 16:32

## 2018-10-29 NOTE — ED NOTES
Pt returned from CT and ambulatory to restroom with steady gait. Pt able to get into position of comfort by self. Awaiting imaging results. Call bell in reach.

## 2018-10-29 NOTE — ED NOTES
Pt resting on stretcher in position of comfort. Awaiting CT scan. Warm blankets provided for comfort. Call bell in reach.

## 2018-10-29 NOTE — DISCHARGE INSTRUCTIONS
Chest Pain: Care Instructions  Your Care Instructions    There are many things that can cause chest pain. Some are not serious and will get better on their own in a few days. But some kinds of chest pain need more testing and treatment. Your doctor may have recommended a follow-up visit in the next 8 to 12 hours. If you are not getting better, you may need more tests or treatment. Even though your doctor has released you, you still need to watch for any problems. The doctor carefully checked you, but sometimes problems can develop later. If you have new symptoms or if your symptoms do not get better, get medical care right away. If you have worse or different chest pain or pressure that lasts more than 5 minutes or you passed out (lost consciousness), call 911 or seek other emergency help right away. A medical visit is only one step in your treatment. Even if you feel better, you still need to do what your doctor recommends, such as going to all suggested follow-up appointments and taking medicines exactly as directed. This will help you recover and help prevent future problems. How can you care for yourself at home? · Rest until you feel better. · Take your medicine exactly as prescribed. Call your doctor if you think you are having a problem with your medicine. · Do not drive after taking a prescription pain medicine. When should you call for help? Call 911 if:    · You passed out (lost consciousness).     · You have severe difficulty breathing.     · You have symptoms of a heart attack. These may include:  ? Chest pain or pressure, or a strange feeling in your chest.  ? Sweating. ? Shortness of breath. ? Nausea or vomiting. ? Pain, pressure, or a strange feeling in your back, neck, jaw, or upper belly or in one or both shoulders or arms. ? Lightheadedness or sudden weakness. ? A fast or irregular heartbeat.   After you call 911, the  may tell you to chew 1 adult-strength or 2 to 4 low-dose aspirin. Wait for an ambulance. Do not try to drive yourself.    Call your doctor today if:    · You have any trouble breathing.     · Your chest pain gets worse.     · You are dizzy or lightheaded, or you feel like you may faint.     · You are not getting better as expected.     · You are having new or different chest pain. Where can you learn more? Go to http://daisy-millicent.info/. Enter A120 in the search box to learn more about \"Chest Pain: Care Instructions. \"  Current as of: November 20, 2017  Content Version: 11.8  © 8150-8200 [employer]. Care instructions adapted under license by [employer] (which disclaims liability or warranty for this information). If you have questions about a medical condition or this instruction, always ask your healthcare professional. [address] any warranty or liability for your use of this information. Shortness of Breath: Care Instructions  Your Care Instructions  Shortness of breath has many causes. Sometimes conditions such as anxiety can lead to shortness of breath. Some people get mild shortness of breath when they exercise. Trouble breathing also can be a symptom of a serious problem, such as asthma, lung disease, emphysema, heart problems, and pneumonia. If your shortness of breath continues, you may need tests and treatment. Watch for any changes in your breathing and other symptoms. Follow-up care is a key part of your treatment and safety. Be sure to make and go to all appointments, and call your doctor if you are having problems. It's also a good idea to know your test results and keep a list of the medicines you take. How can you care for yourself at home? · Do not smoke or allow others to smoke around you. If you need help quitting, talk to your doctor about stop-smoking programs and medicines. These can increase your chances of quitting for good.   · Get plenty of rest and sleep.  · Take your medicines exactly as prescribed. Call your doctor if you think you are having a problem with your medicine. · Find healthy ways to deal with stress. ? Exercise daily. ? Get plenty of sleep. ? Eat regularly and well. When should you call for help? Call 911 anytime you think you may need emergency care. For example, call if:    · You have severe shortness of breath.     · You have symptoms of a heart attack. These may include:  ? Chest pain or pressure, or a strange feeling in the chest.  ? Sweating. ? Shortness of breath. ? Nausea or vomiting. ? Pain, pressure, or a strange feeling in the back, neck, jaw, or upper belly or in one or both shoulders or arms. ? Lightheadedness or sudden weakness. ? A fast or irregular heartbeat. After you call 911, the  may tell you to chew 1 adult-strength or 2 to 4 low-dose aspirin. Wait for an ambulance. Do not try to drive yourself.    Call your doctor now or seek immediate medical care if:    · Your shortness of breath gets worse or you start to wheeze. Wheezing is a high-pitched sound when you breathe.     · You wake up at night out of breath or have to prop your head up on several pillows to breathe.     · You are short of breath after only light activity or while at rest.    Watch closely for changes in your health, and be sure to contact your doctor if:    · You do not get better over the next 1 to 2 days. Where can you learn more? Go to http://daisy-millicent.info/. Enter S780 in the search box to learn more about \"Shortness of Breath: Care Instructions. \"  Current as of: December 6, 2017  Content Version: 11.8  © 0838-4161 [employer]. Care instructions adapted under license by [employer] (which disclaims liability or warranty for this information).  If you have questions about a medical condition or this instruction, always ask your healthcare professional. [place] [address] any warranty or liability for your use of this information.

## 2018-10-29 NOTE — ED PROVIDER NOTES
HPI  
 
57-year-old female with a prior history of peptic ulcer disease, back pain, schizoaffective disorder, PTSD, headaches, cholecystectomy and others here with substernal chest pain and pressure radiating towards her left shoulder.  It feels like it is squeezing.  It is currently 8 out of 10 with nothing making it better or worse.  She has felt short of breath since this morning.  The chest discomfort started 3 hours ago. Manus Greening any nausea, vomiting, diaphoresis, leg pain or swelling, history of blood clots herself or with her family, estrogen use and no prior episodes of similar symptoms.  Denies any other complaints. Social history: Positive smoker.  No alcohol use.  No recent drug use. Past Medical History:  
Diagnosis Date  Back pain  Borderline personality disorder ([facility] [address]  Dysthymic disorder  Headache  Insomnia, unspecified  Post traumatic stress disorder (PTSD)  Psychosis ([facility] [address]  PUD (peptic ulcer disease)  Schizoaffective disorder ([facility] [address] Past Surgical History:  
Procedure Laterality Date  HX CHOLECYSTECTOMY    
 2012 [address] Ulcer surgery of stomach Family History:  
Problem Relation Age of Onset  Hypertension Father  Psychiatric Disorder Father ETOH  Coronary Artery Disease Father [address] [facility] Arthritis-osteo Mother  Thyroid Disease Mother  Stroke Maternal Grandmother  Malignant Hyperthermia Neg Hx  Pseudocholinesterase Deficiency Neg Hx  Delayed Awakening Neg Hx  Post-op Nausea/Vomiting Neg Hx  Emergence Delirium Neg Hx  Post-op Cognitive Dysfunction Neg Hx   
 Other Neg Hx Social History Socioeconomic History  Marital status: SINGLE Spouse name: Not on file  Number of children: Not on file  Years of education: Not on file  Highest education level: Not on file Social Needs  Financial resource strain: Not on file  Food insecurity - worry: Not on file  Food insecurity - inability: Not on file  Transportation needs - medical: Not on file  Transportation needs - non-medical: Not on file Occupational History  Not on file Tobacco Use  Smoking status: Former Smoker Packs/day: 1.00 Years: 1.50 Pack years: 1.50 Types: Cigarettes  Smokeless tobacco: Never Used Substance and Sexual Activity  Alcohol use: No  
 Drug use: No  
  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  
Other Topics Concern  Not on file Social History Narrative  Not on file ALLERGIES: Penicillin g and Aspirin Review of Systems Constitutional: Negative for fever. Respiratory: Positive for chest tightness and shortness of breath. Cardiovascular: Positive for chest pain. Negative for palpitations and leg swelling. Gastrointestinal: Negative for nausea and vomiting. All other systems reviewed and are negative. Vitals:  
 10/29/18 1426 BP: 111/62 Pulse: 67 Resp: 10 Temp: 98.1 °F (36.7 °C) SpO2: 96% Weight: 107.7 kg (237 lb 7 oz) Height: 5' 6\" (1.676 m) Physical Exam  
 
Nursing note and vitals reviewed. Constitutional: appears well-developed and well-nourished. No distress. HENT:  
Head: Normocephalic and atraumatic. Sclera anicteric Nose: No rhinorrhea Mouth/Throat: Oropharynx is clear and moist. Pharynx normal 
Eyes: Conjunctivae are normal. Pupils are equal, round, and reactive to light. Right eye exhibits no discharge. Left eye exhibits no discharge. No scleral icterus. Neck: Painless normal range of motion. Supple Cardiovascular: Normal rate, regular rhythm, normal heart sounds and intact distal pulses. Exam reveals no gallop and no friction rub. No murmur heard. Pulmonary/Chest: Effort normal and breath sounds normal. No respiratory distress. no wheezes. no rales. Abdominal: Soft.  Bowel sounds are normal. Exhibits no distension and no mass. MINIMAL EPIGATRIC TENDERNESS (PT STATES IS CHRONIC). No guarding. Musculoskeletal: Normal range of motion. no tenderness. No edema Lymphadenopathy:   No cervical adenopathy. Neurological:  Alert and oriented to person, place, and time. Coordination normal. CN 2-12 intact. Moving all extremities. Skin: Skin is warm and dry. No rash noted. No pallor. MDM 
  
57-year-old female here with substernal chest squeezing symptoms with associated shortness of breath onset 3 hours ago.  Shortness of breath all day.  Differential diagnosis includes MI, angina, PE and others.  Will check labs, chest x-ray, CT chest for PE.  Baby aspirin. Procedures ED EKG interpretation: 
Rhythm: normal sinus rhythm; and regular . Rate (approx.): 71; Axis: normal; P wave: normal; QRS interval: normal ; ST/T wave: non-specific changes;. This EKG was interpreted by Jose Fair MD,ED Provider. 5:28 PM 
TROPONIN NEGATIVE X 2. Feeling better. cta chest negative. Pt states pain was worse with moving left arm so may have possibly been msk. Tylenol for pain prn.   
 
5:28 PM 
Patient's results have been reviewed with them. Patient and/or family have verbally conveyed their understanding and agreement of the patient's signs, symptoms, diagnosis, treatment and prognosis and additionally agree to follow up as recommended or return to the Emergency Room should their condition change prior to follow-up. Discharge instructions have also been provided to the patient with some educational information regarding their diagnosis as well a list of reasons why they would want to return to the ER prior to their follow-up appointment should their condition change. Recent Results (from the past 24 hour(s)) EKG, 12 LEAD, INITIAL Collection Time: 10/29/18  2:20 PM  
Result Value Ref Range Ventricular Rate 71 BPM  
 Atrial Rate 71 BPM  
 P-R Interval 142 ms QRS Duration 82 ms  Q-T Interval 416 ms  
 QTC Calculation (Bezet) 452 ms Calculated P Axis 51 degrees Calculated R Axis 27 degrees Calculated T Axis 50 degrees Diagnosis Normal sinus rhythm Low voltage QRS Nonspecific T wave abnormality Abnormal ECG When compared with ECG of 06-JAN-2018 00:01, No significant change was found Confirmed by Samantha Knowles MD. ([id number]) on 10/29/2018 3:33:10 PM 
  
SAMPLES BEING HELD Collection Time: 10/29/18  2:39 PM  
Result Value Ref Range SAMPLES BEING HELD STAT    
 COMMENT Add-on orders for these samples will be processed based on acceptable specimen integrity and analyte stability, which may vary by analyte. CBC WITH AUTOMATED DIFF Collection Time: 10/29/18  2:39 PM  
Result Value Ref Range WBC 9.5 3.6 - 11.0 K/uL  
 RBC 3.65 (L) 3.80 - 5.20 M/uL  
 HGB 11.8 11.5 - 16.0 g/dL HCT 36.0 35.0 - 47.0 % MCV 98.6 80.0 - 99.0 FL  
 MCH 32.3 26.0 - 34.0 PG  
 MCHC 32.8 30.0 - 36.5 g/dL  
 RDW 12.5 11.5 - 14.5 % PLATELET 687 858 - 064 K/uL MPV 9.9 8.9 - 12.9 FL  
 NEUTROPHILS 67 32 - 75 % LYMPHOCYTES 25 12 - 49 % MONOCYTES 8 5 - 13 % EOSINOPHILS 0 0 - 7 % BASOPHILS 0 0 - 1 %  
 ABS. NEUTROPHILS 6.2 1.8 - 8.0 K/UL  
 ABS. LYMPHOCYTES 2.4 0.8 - 3.5 K/UL  
 ABS. MONOCYTES 0.8 0.0 - 1.0 K/UL  
 ABS. EOSINOPHILS 0.0 0.0 - 0.4 K/UL  
 ABS. BASOPHILS 0.0 0.0 - 0.1 K/UL  
 DF AUTOMATED XXWBCSUS 0    
METABOLIC PANEL, COMPREHENSIVE Collection Time: 10/29/18  2:39 PM  
Result Value Ref Range Sodium 139 136 - 145 mmol/L Potassium 4.0 3.5 - 5.1 mmol/L Chloride 104 97 - 108 mmol/L  
 CO2 28 21 - 32 mmol/L Anion gap 7 5 - 15 mmol/L Glucose 91 65 - 100 mg/dL BUN 12 6 - 20 MG/DL Creatinine 1.10 (H) 0.55 - 1.02 MG/DL  
 BUN/Creatinine ratio 11 (L) 12 - 20 GFR est AA >60 >60 ml/min/1.73m2 GFR est non-AA 53 (L) >60 ml/min/1.73m2 Calcium 8.4 (L) 8.5 - 10.1 MG/DL  Bilirubin, total 0.3 0.2 - 1.0 MG/DL  
 ALT (SGPT) 10 (L) 12 - 78 U/L  
 AST (SGOT) 14 (L) 15 - 37 U/L Alk. phosphatase 62 45 - 117 U/L Protein, total 6.5 6.4 - 8.2 g/dL Albumin 3.5 3.5 - 5.0 g/dL Globulin 3.0 2.0 - 4.0 g/dL A-G Ratio 1.2 1.1 - 2.2    
TROPONIN I Collection Time: 10/29/18  2:39 PM  
Result Value Ref Range Troponin-I, Qt. <0.05 <0.05 ng/mL LIPASE Collection Time: 10/29/18  2:39 PM  
Result Value Ref Range Lipase 153 73 - 393 U/L  
POC TROPONIN-I Collection Time: 10/29/18  4:51 PM  
Result Value Ref Range Troponin-I (POC) <0.04 0.00 - 0.08 ng/mL Cta Chest W Or W Wo Cont Result Date: 10/29/2018 Clinical indication: Chest pain and shortness of breath, acute. Localizer obtained without contrast at the level of the pulmonary arteries. Fast injection rate of 100 cc of Isovue-370 with review of the raw data and MIP reconstructions. No prior. CT dose reduction was achieved through the use of a standardized protocol tailored for this examination and automatic exposure control for dose modulation . Ananth Saxena Small bilateral pleural effusions. No pulmonary emboli. No shift or pneumothorax, no adenopathy or parenchymal mass. Axilla appear unremarkable. IMPRESSION:  No pulmonary emboli. Xr Chest [facility] Result Date: 10/29/2018 Chest portable AP History: Left-sided chest pain. Short of breath. Comparison: 1/6/2018 Findings: The lungs are well expanded. No focal consolidation, pleural effusion, or pneumothorax. The cardiomediastinal silhouette is unremarkable. The visualized osseous structures are unremarkable. Impression: No acute cardiopulmonary process.

## 2018-10-29 NOTE — ED TRIAGE NOTES
Pt reports getting a pain her left chest that feels like a \"squeezing\" and has felt short of breath for the past few hours

## 2018-10-29 NOTE — ED NOTES
Patient had 4\" orange lay knife and can of mace with her upon presentation. Items to nurses' station for safegarding while patient in this facility. Will return upon discharge. Bibi Kern RN witnessed securement of items.

## 2018-10-29 NOTE — ED NOTES
The patient was discharged home by Dr. Perry Canela in stable condition. The patient is alert and oriented, in no respiratory distress and discharge vital signs obtained. The patient's diagnosis, condition and treatment were explained. The patient expressed understanding. No prescriptions given. No work/school note given. A discharge plan has been developed. A  was not involved in the process. Aftercare instructions were given. Pt ambulatory out of the ED.

## 2018-10-30 ENCOUNTER — OFFICE VISIT (OUTPATIENT)
Dept: FAMILY MEDICINE CLINIC | Age: 48
End: 2018-10-30

## 2018-10-30 VITALS
RESPIRATION RATE: 16 BRPM | BODY MASS INDEX: 36.96 KG/M2 | HEART RATE: 77 BPM | HEIGHT: 66 IN | DIASTOLIC BLOOD PRESSURE: 57 MMHG | OXYGEN SATURATION: 98 % | SYSTOLIC BLOOD PRESSURE: 99 MMHG | WEIGHT: 230 LBS | TEMPERATURE: 98.7 F

## 2018-10-30 DIAGNOSIS — M25.512 ACUTE PAIN OF LEFT SHOULDER: Primary | ICD-10-CM

## 2018-10-30 RX ORDER — ZIPRASIDONE HYDROCHLORIDE 80 MG/1
80 CAPSULE ORAL
COMMUNITY
Start: 2018-10-01

## 2018-10-30 RX ORDER — METHOCARBAMOL 500 MG/1
500 TABLET, FILM COATED ORAL
Qty: 21 TAB | Refills: 0 | Status: ON HOLD | OUTPATIENT
Start: 2018-10-30 | End: 2018-11-08

## 2018-10-30 RX ORDER — CLONAZEPAM 1 MG/1
1 TABLET ORAL 3 TIMES DAILY
COMMUNITY
Start: 2018-10-17 | End: 2020-07-05

## 2018-10-30 RX ORDER — TROSPIUM CHLORIDE 20 MG/1
20 TABLET, FILM COATED ORAL 2 TIMES DAILY
COMMUNITY
Start: 2018-10-20

## 2018-10-30 NOTE — PATIENT INSTRUCTIONS
Shoulder Pain: Care Instructions  Your Care Instructions    You can hurt your shoulder by using it too much during an activity, such as fishing or baseball. It can also happen as part of the everyday wear and tear of getting older. Shoulder injuries can be slow to heal, but your shoulder should get better with time. Your doctor may recommend a sling to rest your shoulder. If you have injured your shoulder, you may need testing and treatment. Follow-up care is a key part of your treatment and safety. Be sure to make and go to all appointments, and call your doctor if you are having problems. It's also a good idea to know your test results and keep a list of the medicines you take. How can you care for yourself at home? · Take pain medicines exactly as directed. ? If the doctor gave you a prescription medicine for pain, take it as prescribed. ? If you are not taking a prescription pain medicine, ask your doctor if you can take an over-the-counter medicine. ? Do not take two or more pain medicines at the same time unless the doctor told you to. Many pain medicines contain acetaminophen, which is Tylenol. Too much acetaminophen (Tylenol) can be harmful. · If your doctor recommends that you wear a sling, use it as directed. Do not take it off before your doctor tells you to. · Put ice or a cold pack on the sore area for 10 to 20 minutes at a time. Put a thin cloth between the ice and your skin. · If there is no swelling, you can put moist heat, a heating pad, or a warm cloth on your shoulder. Some doctors suggest alternating between hot and cold. · Rest your shoulder for a few days. If your doctor recommends it, you can then begin gentle exercise of the shoulder, but do not lift anything heavy. When should you call for help? Call 911 anytime you think you may need emergency care. For example, call if:    · You have chest pain or pressure. This may occur with:  ? Sweating. ?  Shortness of breath. ? Nausea or vomiting. ? Pain that spreads from the chest to the neck, jaw, or one or both shoulders or arms. ? Dizziness or lightheadedness. ? A fast or uneven pulse. After calling 911, chew 1 adult-strength aspirin. Wait for an ambulance. Do not try to drive yourself.     · Your arm or hand is cool or pale or changes color.    Call your doctor now or seek immediate medical care if:    · You have signs of infection, such as:  ? Increased pain, swelling, warmth, or redness in your shoulder. ? Red streaks leading from a place on your shoulder. ? Pus draining from an area of your shoulder. ? Swollen lymph nodes in your neck, armpits, or groin. ? A fever.    Watch closely for changes in your health, and be sure to contact your doctor if:    · You cannot use your shoulder.     · Your shoulder does not get better as expected. Where can you learn more? Go to http://daisy-millicent.info/. Enter S352 in the search box to learn more about \"Shoulder Pain: Care Instructions. \"  Current as of: November 29, 2017  Content Version: 11.8  © 4663-6866 [email or web address]. Care instructions adapted under license by [employer] (which disclaims liability or warranty for this information). If you have questions about a medical condition or this instruction, always ask your healthcare professional. [address] any warranty or liability for your use of this information.

## 2018-10-30 NOTE — PROGRESS NOTES
Identified pt with two pt identifiers(name and ). Chief Complaint   Patient presents with   [facility] Follow Up     was seen yesterday at [facility] ER for chest pain - she reports pain is still present, she was advised pain was muscular in nature and she thinks she may just need a muscle relaxer        Health Maintenance Due   Topic    Pneumococcal 19-64 Highest Risk (3 of 3 - PCV13)    Shingrix Vaccine Age 50> (1 of 2)    BREAST CANCER SCRN MAMMOGRAM     FOBT Q 1 YEAR AGE 50-75     Influenza Age 5 to Adult     MEDICARE YEARLY EXAM        Wt Readings from Last 3 Encounters:   10/30/18 230 lb (104.3 kg)   10/29/18 237 lb 7 oz (107.7 kg)   10/07/18 231 lb 7.7 oz (105 kg)     Temp Readings from Last 3 Encounters:   10/30/18 98.7 °F (37.1 °C) (Oral)   10/29/18 98 °F (36.7 °C)   10/07/18 97.4 °F (36.3 °C)     BP Readings from Last 3 Encounters:   10/30/18 99/57   10/29/18 110/64   10/07/18 112/57     Pulse Readings from Last 3 Encounters:   10/30/18 77   10/29/18 62   10/07/18 92         Learning Assessment:  :     Learning Assessment 2013   PRIMARY LEARNER Patient Patient Patient   HIGHEST LEVEL OF EDUCATION - PRIMARY LEARNER  - - > 4 YEARS OF COLLEGE   BARRIERS PRIMARY LEARNER - - NONE   CO-LEARNER CAREGIVER - - No   PRIMARY LANGUAGE ENGLISH ENGLISH ENGLISH    NEED - - No   LEARNER PREFERENCE PRIMARY DEMONSTRATION READING LISTENING     - LISTENING -   ANSWERED BY patient patient self   RELATIONSHIP SELF SELF SELF   ASSESSMENT COMMENT - - no       Depression Screening:  :     PHQ over the last two weeks 2018   PHQ Not Done -   Little interest or pleasure in doing things Not at all   Feeling down, depressed, irritable, or hopeless Not at all   Total Score PHQ 2 0       Fall Risk Assessment:  :     Fall Risk Assessment, last 12 mths 2018   Able to walk? Yes   Fall in past 12 months?  No       Abuse Screening:  :     Abuse Screening Questionnaire 2018 8/15/2016   Do you ever feel afraid of your partner? N N N   Are you in a relationship with someone who physically or mentally threatens you? N N N   Is it safe for you to go home? Marii Prado       Coordination of Care Questionnaire:  :     1) Have you been to an emergency room, urgent care clinic since your last visit? Yes  Hospitalized since your last visit? no             2) Have you seen or consulted any other health care providers outside of [address] [place] [address] since your last visit? no  (Include any pap smears or colon screenings in this section.)    3) Do you have an Advance Directive on file? no  Are you interested in receiving information about Advance Directives? no    Patient is accompanied by self. Reviewed record in preparation for visit and have obtained necessary documentation. Medication reconciliation up to date and corrected with patient at this time.

## 2018-10-30 NOTE — PROGRESS NOTES
HISTORY OF PRESENT ILLNESS  Benny Mukherjee is a 46 y.o. female. HPI  Pt presents with \"chest pain\"    Yesterday, patient went to the [facility] ER for chest pain and left shoulder pain. EKG, CT and blood work was all negative. ER informed her that pain was probably msk in origin, and to follow up with PCP in regards to this. ER note reviewed in chart. Pt states that she still has pain in front of chest that radiates to left shoulder. Pt describes the pain as an aching type pain. Pain is worse with movement. No neck pain. Pt has tried Tylenol, with little to no benefit. No numbness or tingling in hands or feet. No shortness of breath. Review of Systems   Constitutional: Negative for fever. HENT: Negative for congestion. Respiratory: Negative for cough and shortness of breath. Cardiovascular: Positive for chest pain. Gastrointestinal: Negative for abdominal pain, diarrhea and nausea. Musculoskeletal: Positive for joint pain. Physical Exam   Constitutional: She is oriented to person, place, and time. She appears well-developed and well-nourished. HENT:   Head: Normocephalic and atraumatic. Cardiovascular: Normal rate, regular rhythm and normal heart sounds. Pulmonary/Chest: Effort normal and breath sounds normal.       Neurological: She is alert and oriented to person, place, and time. She has normal strength. Skin: Skin is warm and dry. Psychiatric: She has a normal mood and affect. Her behavior is normal.       ASSESSMENT and PLAN    ICD-10-CM ICD-9-CM    1. Acute pain of left shoulder M25.512 719.41 methocarbamol (ROBAXIN) 500 mg tablet     Informed patient that I have sent medication to the pharmacy, and she should take as prescribed. Educated about not driving while taking this medication, and common side effects,  She should notify office should symptoms continue and/or worsen.     Educated about always calling 911 or going to the ER with ANY shortness of breath, continued/worsening of chest pain, etc.    Pt informed to return to office with worsening of symptoms, or PRN with any questions or concerns. Pt verbalizes understanding of plan of care and denies further questions or concerns at this time.

## 2018-11-07 ENCOUNTER — ANESTHESIA EVENT (OUTPATIENT)
Dept: ENDOSCOPY | Age: 48
End: 2018-11-07
Payer: MEDICAID

## 2018-11-07 NOTE — ANESTHESIA PREPROCEDURE EVALUATION
Anesthetic History No history of anesthetic complications Review of Systems / Medical History Patient summary reviewed, nursing notes reviewed and pertinent labs reviewed Pulmonary Within defined limits Neuro/Psych Headaches and psychiatric history Cardiovascular Within defined limits GI/Hepatic/Renal 
  
 
 
 
PUD Endo/Other Obesity Other Findings Physical Exam 
 
Airway Mallampati: III 
TM Distance: > 6 cm Neck ROM: normal range of motion Mouth opening: Normal 
 
 Cardiovascular Regular rate and rhythm,  S1 and S2 normal,  no murmur, click, rub, or gallop Dental 
 
Dentition: Edentulous Pulmonary Breath sounds clear to auscultation Abdominal 
GI exam deferred Other Findings Anesthetic Plan ASA: 3 Anesthesia type: MAC Induction: Intravenous Anesthetic plan and risks discussed with: Patient

## 2018-11-08 ENCOUNTER — ANESTHESIA (OUTPATIENT)
Dept: ENDOSCOPY | Age: 48
End: 2018-11-08
Payer: MEDICAID

## 2018-11-08 ENCOUNTER — HOSPITAL ENCOUNTER (OUTPATIENT)
Age: 48
Setting detail: OUTPATIENT SURGERY
Discharge: HOME | End: 2018-11-08
Attending: INTERNAL MEDICINE | Admitting: INTERNAL MEDICINE
Payer: MEDICAID

## 2018-11-08 VITALS
SYSTOLIC BLOOD PRESSURE: 127 MMHG | DIASTOLIC BLOOD PRESSURE: 67 MMHG | BODY MASS INDEX: 35.36 KG/M2 | HEART RATE: 70 BPM | RESPIRATION RATE: 15 BRPM | HEIGHT: 66 IN | WEIGHT: 220 LBS | OXYGEN SATURATION: 97 %

## 2018-11-08 PROCEDURE — 77030027957 HC TBNG IRR ENDOGTR BUSS -B: Performed by: INTERNAL MEDICINE

## 2018-11-08 PROCEDURE — 74011250636 HC RX REV CODE- 250/636

## 2018-11-08 PROCEDURE — 88305 TISSUE EXAM BY PATHOLOGIST: CPT

## 2018-11-08 PROCEDURE — 77030009426 HC FCPS BIOP ENDOSC BSC -B: Performed by: INTERNAL MEDICINE

## 2018-11-08 PROCEDURE — 76040000007: Performed by: INTERNAL MEDICINE

## 2018-11-08 PROCEDURE — 76060000032 HC ANESTHESIA 0.5 TO 1 HR: Performed by: INTERNAL MEDICINE

## 2018-11-08 RX ORDER — EPINEPHRINE 0.1 MG/ML
1 INJECTION INTRACARDIAC; INTRAVENOUS
Status: DISCONTINUED | OUTPATIENT
Start: 2018-11-08 | End: 2018-11-08 | Stop reason: HOSPADM

## 2018-11-08 RX ORDER — FENTANYL CITRATE 50 UG/ML
200 INJECTION, SOLUTION INTRAMUSCULAR; INTRAVENOUS
Status: DISCONTINUED | OUTPATIENT
Start: 2018-11-08 | End: 2018-11-08 | Stop reason: HOSPADM

## 2018-11-08 RX ORDER — NALOXONE HYDROCHLORIDE 0.4 MG/ML
0.4 INJECTION, SOLUTION INTRAMUSCULAR; INTRAVENOUS; SUBCUTANEOUS
Status: DISCONTINUED | OUTPATIENT
Start: 2018-11-08 | End: 2018-11-08 | Stop reason: HOSPADM

## 2018-11-08 RX ORDER — ATROPINE SULFATE 0.1 MG/ML
0.5 INJECTION INTRAVENOUS
Status: DISCONTINUED | OUTPATIENT
Start: 2018-11-08 | End: 2018-11-08 | Stop reason: HOSPADM

## 2018-11-08 RX ORDER — SODIUM CHLORIDE 9 MG/ML
100 INJECTION, SOLUTION INTRAVENOUS CONTINUOUS
Status: DISCONTINUED | OUTPATIENT
Start: 2018-11-08 | End: 2018-11-08 | Stop reason: HOSPADM

## 2018-11-08 RX ORDER — DEXTROMETHORPHAN/PSEUDOEPHED 2.5-7.5/.8
1.2 DROPS ORAL
Status: DISCONTINUED | OUTPATIENT
Start: 2018-11-08 | End: 2018-11-08 | Stop reason: HOSPADM

## 2018-11-08 RX ORDER — SODIUM CHLORIDE 9 MG/ML
INJECTION, SOLUTION INTRAVENOUS
Status: DISCONTINUED | OUTPATIENT
Start: 2018-11-08 | End: 2018-11-08 | Stop reason: HOSPADM

## 2018-11-08 RX ORDER — MIDAZOLAM HYDROCHLORIDE 1 MG/ML
.25-1 INJECTION, SOLUTION INTRAMUSCULAR; INTRAVENOUS
Status: DISCONTINUED | OUTPATIENT
Start: 2018-11-08 | End: 2018-11-08 | Stop reason: HOSPADM

## 2018-11-08 RX ORDER — SODIUM CHLORIDE 0.9 % (FLUSH) 0.9 %
5-10 SYRINGE (ML) INJECTION EVERY 8 HOURS
Status: DISCONTINUED | OUTPATIENT
Start: 2018-11-08 | End: 2018-11-08 | Stop reason: HOSPADM

## 2018-11-08 RX ORDER — SODIUM CHLORIDE 0.9 % (FLUSH) 0.9 %
5-10 SYRINGE (ML) INJECTION AS NEEDED
Status: DISCONTINUED | OUTPATIENT
Start: 2018-11-08 | End: 2018-11-08 | Stop reason: HOSPADM

## 2018-11-08 RX ORDER — PROPOFOL 10 MG/ML
INJECTION, EMULSION INTRAVENOUS AS NEEDED
Status: DISCONTINUED | OUTPATIENT
Start: 2018-11-08 | End: 2018-11-08 | Stop reason: HOSPADM

## 2018-11-08 RX ORDER — LIDOCAINE HYDROCHLORIDE 20 MG/ML
INJECTION, SOLUTION EPIDURAL; INFILTRATION; INTRACAUDAL; PERINEURAL AS NEEDED
Status: DISCONTINUED | OUTPATIENT
Start: 2018-11-08 | End: 2018-11-08 | Stop reason: HOSPADM

## 2018-11-08 RX ORDER — FLUMAZENIL 0.1 MG/ML
0.2 INJECTION INTRAVENOUS
Status: DISCONTINUED | OUTPATIENT
Start: 2018-11-08 | End: 2018-11-08 | Stop reason: HOSPADM

## 2018-11-08 RX ADMIN — PROPOFOL 50 MG: 10 INJECTION, EMULSION INTRAVENOUS at 16:59

## 2018-11-08 RX ADMIN — PROPOFOL 50 MG: 10 INJECTION, EMULSION INTRAVENOUS at 17:02

## 2018-11-08 RX ADMIN — PROPOFOL 50 MG: 10 INJECTION, EMULSION INTRAVENOUS at 16:32

## 2018-11-08 RX ADMIN — LIDOCAINE HYDROCHLORIDE 40 MG: 20 INJECTION, SOLUTION EPIDURAL; INFILTRATION; INTRACAUDAL; PERINEURAL at 16:30

## 2018-11-08 RX ADMIN — PROPOFOL 50 MG: 10 INJECTION, EMULSION INTRAVENOUS at 16:33

## 2018-11-08 RX ADMIN — SODIUM CHLORIDE: 9 INJECTION, SOLUTION INTRAVENOUS at 16:30

## 2018-11-08 RX ADMIN — PROPOFOL 50 MG: 10 INJECTION, EMULSION INTRAVENOUS at 16:53

## 2018-11-08 RX ADMIN — PROPOFOL 50 MG: 10 INJECTION, EMULSION INTRAVENOUS at 16:49

## 2018-11-08 RX ADMIN — PROPOFOL 80 MG: 10 INJECTION, EMULSION INTRAVENOUS at 16:38

## 2018-11-08 RX ADMIN — PROPOFOL 50 MG: 10 INJECTION, EMULSION INTRAVENOUS at 16:30

## 2018-11-08 RX ADMIN — PROPOFOL 70 MG: 10 INJECTION, EMULSION INTRAVENOUS at 16:42

## 2018-11-08 RX ADMIN — PROPOFOL 50 MG: 10 INJECTION, EMULSION INTRAVENOUS at 16:31

## 2018-11-08 NOTE — H&P
The patient is a 46year old female who presents with a complaint of Abdominal Pain. The patient presents for reoccurrence of symptoms. The onset of the abdominal pain has been gradual and has been occurring in a persistent pattern for 3 months with a increasing course . The abdominal pain is described as moderate sharp pain and  is described as being located in the right upper quadrant and epigastrium .  The abdominal pain does not radiate. The symptoms are aggravated by meals (1/2 to 1 hour after eating) and stress. The symptoms are relieved by nothing (Vicodin). The symptoms have been associated with diarrhea (intermittent), heartburn and nausea,  while the symptoms have not been associated with bloody stools, black stools, constipation or early satiety. The patient had a colonoscopy 20 years ago and  had an EGD 30 year(s) ago . [place] patient has been using H2 antagonist (75mg BID) and omeprazole (Prilosec) (psychaitrist said she could only Tagamet), while she denies the use of use of NSAIDs. Note for \"Abdominal Pain\": She's had 2 ER visits for the pain. She had normal labs and CT scan. Problem List/Past Medical (Chloe Simpson; 10/18/2018 10:30 AM) Schizo-affective psychosis (295.70  F25.9)   
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   
Depression   
Colonic Polyps   
C. difficile colitis (008.45  A04.7)   
Heartburn (787.1  R12)   
Abdominal pain, epigastric (789.06  R10.13)   
 
Past Surgical History (Chloe Mccrary Eddy; 10/18/2018 10:30 AM) Ulcer Surgery   
Polypectomy   
Cholecystectomy   
 
Allergies (Heidia RAE Simpson; 10/18/2018 10:30 AM) Penicillins   
 
Medication History (Heidia RAE Simpson; 10/18/2018 10:38 AM) Dicyclomine HCl  (10MG Capsule, 1 (one) Capsule Oral before meals and at bedtime, Taken starting 06/24/2013) Active. Cymbalta  (60MG Capsule DR Part, 1 tablet Oral bid) Active. Ativan  (1MG Tablet, Oral daily) Active. Cogentin  (1MG Tablet, 1 tablet Oral twice a day) Active. Minipress  (2MG Capsule, 1 tablet Oral daily) Active. Ziprasidone HCl  (40MG Capsule, 1 tablet Oral twice a day) Active. Topiramate  (50MG Tablet, 1 tablet Oral daily) Active. CloZAPine  (100MG Tablet, 1 tablet Oral twice a day) Active. Divalproex Sodium  (250MG Tablet DR, 1 tablet Oral three times daily) Active. Lithium Carbonate  (300MG Tablet, 1 tablet Oral twice a day) Active. Imitrex  (100MG Tablet, 1 tablet Oral as needed may be repeated) Active. KlonoPIN  (1MG Tablet, Oral) Active. Medications Reconciled  
 
Family History (Chloe Ha; 10/18/2018 10:30 AM) Depression   Mother, Father, First Degree Relatives. Hypertension   Father. depression, B12 defifency B12 deficiency (266.2  E53.8)   Father, First Degree Relatives. Social History (Chloe Ha; 10/18/2018 10:30 AM) Never drank Alcohol   
Tobacco Use   Never smoker. Marital status   Single. Employment status   Full-time. Blood Transfusion   No. 
 
Diagnostic Studies History (Chloe Ha; 10/18/2018 10:30 AM) Colonoscopy   
Endoscopy   
 
Health Maintenance History (Chloe Ha; 10/18/2018 10:30 AM) Flu Vaccine  [2017]: Pneumovax  [2017]: 
 
 
 
Review of Systems (Chloe Ha; 10/18/2018 10:30 AM) General Not Present- Chronic Fatigue, Poor Appetite, Weight Gain and Weight Loss. Skin Not Present- Itching, Rash and Skin Color Changes. HEENT Not Present- Hearing Loss and Vertigo. Respiratory Not Present- Difficulty Breathing and TB exposure. Cardiovascular Present- Chest Pain. Not Present- Use of Antibiotics before Dental Procedures and Use of Blood Thinners. Gastrointestinal Present- See HPI. Musculoskeletal Not Present- Arthritis, Hip Replacement Surgery and Knee Replacement Surgery. Neurological Not Present- Weakness. Psychiatric Not Present- Depression. Endocrine Not Present- Diabetes and Thyroid Problems. Hematology Not Present- Anemia. Vitals (Chloe Pablo Acosta; 10/18/2018 10:35 AM) 10/18/2018 10:32 AM 
Weight: 240 lb   Height: 66 in  
Body Surface Area: 2.16 m²   Body Mass Index: 38.74 kg/m²   
Pulse: 84 (Regular)    Resp.: 20 (Unlabored)    
BP: 124/86 (Sitting, Left Arm, Standard) Physical Exam Sejal Arias AGAP; 10/18/2018 10:51 AM) General 
Mental Status - Alert. General Appearance - Cooperative, Pleasant, Not in acute distress. Orientation - Oriented X3. Build & Nutrition - Well nourished and Well developed. Integumentary General Characteristics Color - normal coloration of skin. Temperature - normal warmth is noted. Mobility & Turgor - normal mobility and turgor. Head and Neck Head - normocephalic, atraumatic with no lesions or palpable masses. Neck Global Assessment - full range of motion and supple. Trachea - midline. Eye Eyeball - Bilateral - No Exophthalmos. Sclera/Conjunctiva - Left - No Jaundice. Sclera/Conjunctiva - Right - No Jaundice. Chest and Lung Exam 
Chest and lung exam reveals  - quiet, even and easy respiratory effort with no use of accessory muscles. Auscultation Breath sounds - Normal. Adventitious sounds - No Adventitious sounds. Cardiovascular Auscultation Rhythm - Regular, No Tachycardia, No Bradycardia . Heart Sounds - Normal heart sounds , S1 WNL and S2 WNL, No S3, No Summation Gallop. Murmurs & Other Heart Sounds - Auscultation of the heart reveals - No Murmurs. Abdomen Inspection Inspection of the abdomen reveals - Soft and Obese. Palpation/Percussion Tenderness - Epigastrium. Rebound tenderness - No rebound. Rigidity (guarding) - No Rigidity. Dullness to percussion - No abnormal dullness to percussion. Liver - No hepatosplenomegaly. Abdominal Mass Palpable - No masses. Other Characteristics - No Ascites. Auscultation Auscultation of the abdomen reveals - Bowel sounds normal, No Abdominal bruits and No Succussion splash. Rectal - Did not examine. Peripheral Vascular Upper Extremity Inspection - Left - Normal - No Clubbing, No Cyanosis, No Edema, Pulses Intact. Right - Normal - No Clubbing, No Cyanosis, No Edema, Pulses Intact. Palpation - Edema - Left - No edema. Right - No edema. Neurologic Neurologic evaluation reveals  - Cranial nerves grossly intact, no focal neurologic deficits. Musculoskeletal 
Global Assessment Gait and Station - normal gait and station. Assessment & Plan (Eladia Gill. Juana AGACNP; 10/18/2018 11:01 AM) Abdominal pain, epigastric (789.06  R10.13) Story: History of gastric ulcers and C. diff Impression: She does not remember office visit with Dr. Shellie Kirk in May. She continues to have epigastric/RUQ pain with nausea. She's had 2 ER visits with normal labs and CT. She is unable to take PPI due to psychiatrist recommendations/interactions with psychiatric medications. She is on Tagamet, will increase to 150 mg BID. She has used Bentyl from ER visits, found it minimally effective. She will try Bentyl twice daily. Will further evaluate with EGD. Consider ultrasound if EGD negative. Current Plans Started Bentyl 10MG, 1 (one) Capsule two times daily, #60, 30 days starting 10/18/2018, Ref. x2. 
Pt Education - How to access health information online: discussed with patient and provided information. Endoscopy (75074) (Discussed risks and benefits with the patient to include: perforation,or post biopsy bleeding, missed lesions, and sedation reactions.) Patient is to call me for any questions or concerns. Next test to be considered:  ultrasound Follow up office visit after procedure This patient was reviewed and seen in collaboration with Dr. Millicent Butler. Nausea (787.02  R11.0) Colon cancer screening (V76.51  Z12.11) Current Plans Colonoscopy (17407) (Discussed risks and benefits with the patient to include:; perforation, post polypectomy, or post biopsy bleeding, missed lesions, and sedation reactions.) Started Suprep Bowel Prep Kit 17.5-3.13-1.6GM/180ML, 1 (one) Milliliter Date of Surgery Update: 
Ofelia Gaston was seen and examined. History and physical has been reviewed. The patient has been examined.  There have been no significant clinical changes since the completion of the originally dated History and Physical. 
 
Signed By: Jesús Chavez MD   
 November 8, 2018 4:29 PM

## 2018-11-08 NOTE — PROCEDURES
[address]  [address]      Colonoscopy Operative Report    Hiren Real  [id number]  1970      Procedure Type:   Colonoscopy with polypectomy (hot biopsy)     Indications:    Screening colonoscopy   First one    Pre-operative Diagnosis: see indication above    Post-operative Diagnosis:  See findings below    :  Olympia Primrose, MD      Referring Provider: Sabi Freeman NP      Sedation:  MAC anesthesia Propofol      Procedure Details:  After informed consent was obtained with all risks and benefits of procedure explained and preoperative exam completed, the patient was taken to the endoscopy suite and placed in the left lateral decubitus position. Upon sequential sedation as per above, a digital rectal exam was performed demonstrating internal hemorrhoids. The Olympus videocolonoscope  was inserted in the rectum and carefully advanced to the cecum, which was identified by the ileocecal valve and appendiceal orifice. The cecum was identified by the ileocecal valve and appendiceal orifice. The quality of preparation was good. The colonoscope was slowly withdrawn with careful evaluation between folds. Retroflexion in the rectum was completed . Findings:   Rectum: normal  Sigmoid: normal  Descending Colon: normal  Transverse Colon: normal  Ascending Colon: 9 mm polyp removed by hot biopsy  Cecum: normal        Specimen Removed:  as above    Complications: None. EBL:  None. Impression:     as above    Recommendations: --Await pathology.       Recommendation for next colonscopy in 5 years  F/u in 2 months  She is doing better on bentyl, to continue on    Signed By: Olympia Primrose, MD     11/8/2018  5:05 PM

## 2018-11-08 NOTE — DISCHARGE INSTRUCTIONS
[address]  [address]    EGD and COLON DISCHARGE INSTRUCTIONS    Zhang Garcia  [id number]  1970    Discomfort:  Redness at IV site- apply warm compress to area; if redness or soreness persist- contact your physician  There may be a slight amount of blood passed from the rectum  Gaseous discomfort- walking, belching will help relieve any discomfort  You may not operate a vehicle for 12 hours  You may not engage in an occupation involving machinery or appliances for rest of today  You may not drink alcoholic beverages for at least 12 hours  Avoid making any critical decisions for at least 24 hour  DIET:  You may resume your regular diet - however -  remember your colon is empty and a heavy meal will produce gas. Avoid these foods:  vegetables, fried / greasy foods, carbonated drinks     ACTIVITY:  You may  resume your normal daily activities it is recommended that you spend the remainder of the day resting -  avoid any strenuous activity. CALL M.D.   ANY SIGN OF:   Increasing pain, nausea, vomiting  Abdominal distension (swelling)  New increased bleeding (oral or rectal)  Fever (chills)  Pain in chest area  Bloody discharge from nose or mouth  Shortness of breath      Follow-up Instructions:   Call Dr. Ran Winchester for any questions or problems at [phone] and follow up with him in 2 months          ENDOSCOPY FINDINGS:   Your colonoscopy showed one small polyp I removed   Your endoscopy was normal.  Telephone # [phone]      Signed By: Ran Winchester MD     11/8/2018  5:12 PM       DISCHARGE SUMMARY from Nurse    The following personal items collected during your admission are returned to you:   Dental Appliance: Dental Appliances: None  Vision: Visual Aid: None  Hearing Aid:    Jewelry:    Clothing:    Other Valuables:    Valuables sent to safe:

## 2018-11-08 NOTE — PROGRESS NOTES
Pt unable to void at this time for UPT. Pt states she is not sexually active with men, declines UPT. OK'd by anesthesia.

## 2018-11-08 NOTE — PROGRESS NOTES
Received report from anesthesia staff on vital signs and status of patient. Scopes precleaned at bedside by Delia Tee

## 2018-11-08 NOTE — PROCEDURES
[address]  [address]        [facility]- Gastroduodenoscopy (EGD) Procedure Note    Zhang Garcia  1970  [id number]      Procedure: Endoscopic Gastroduodenoscopy with biopsy    Indication:  Abdominal pain, epigastric     Pre-operative Diagnosis: see indication above    Post-operative Diagnosis: see findings below    : Ran Winchester MD    Referring Provider:  Molly Lynch NP      Anesthesia/Sedation:  MAC anesthesia Propofol        Procedure Details     After infomed consent was obtained for the procedure, with all risks and benefits of procedure explained the patient was taken to the endoscopy suite and placed in the left lateral decubitus position. Following sequential administration of sedation as per above, the endoscope was inserted into the mouth and advanced under direct vision to third portion of the duodenum. A careful inspection was made as the gastroscope was withdrawn, including a retroflexed view of the proximal stomach; findings and interventions are described below. Findings:   Esophagus:normal  Stomach: normal   Duodenum/jejunum: normal, random biopsies taken      Therapies:  none    Specimens: as above         EBL: None      Complications:   None; patient tolerated the procedure well. Impression:    -See post-procedure diagnoses.     Recommendations:  -Continue acid suppression.  -colonoscopy today    Signed By: Ran Winchester MD     11/8/2018  4:38 PM

## 2018-11-11 NOTE — ANESTHESIA POSTPROCEDURE EVALUATION
Procedure(s): 
COLONOSCOPY 
ESOPHAGOGASTRODUODENOSCOPY (EGD) ENDOSCOPIC POLYPECTOMY. 
 
<BSHSIANPOST> Visit Vitals /67 Pulse 70 Resp 15 Ht 5' 6\" (1.676 m) Wt 99.8 kg (220 lb) SpO2 97% Breastfeeding? No  
BMI 35.51 kg/m²

## 2018-11-23 ENCOUNTER — TELEPHONE (OUTPATIENT)
Dept: NEUROLOGY | Age: 48
End: 2018-11-23

## 2018-11-23 DIAGNOSIS — G43.909 MIGRAINE WITHOUT STATUS MIGRAINOSUS, NOT INTRACTABLE, UNSPECIFIED MIGRAINE TYPE: ICD-10-CM

## 2018-11-23 RX ORDER — TOPIRAMATE 100 MG/1
100 TABLET, FILM COATED ORAL
Qty: 30 TAB | Refills: 5 | Status: SHIPPED | OUTPATIENT
Start: 2018-11-23 | End: 2019-01-24

## 2018-11-23 NOTE — TELEPHONE ENCOUNTER
----- Message from Tacy Drain sent at 11/23/2018  9:22 AM EST -----  Regarding: /Refill  Pt called requesting a refill on the medication toprimet. Pt use the [facility] pharmacy phone number is  [phone]. Pt best phone number are [phone] and [phone] .

## 2018-12-03 ENCOUNTER — OFFICE VISIT (OUTPATIENT)
Dept: FAMILY MEDICINE CLINIC | Age: 48
End: 2018-12-03

## 2018-12-03 VITALS
BODY MASS INDEX: 37.12 KG/M2 | SYSTOLIC BLOOD PRESSURE: 130 MMHG | WEIGHT: 231 LBS | HEART RATE: 88 BPM | HEIGHT: 66 IN | DIASTOLIC BLOOD PRESSURE: 80 MMHG | RESPIRATION RATE: 18 BRPM

## 2018-12-03 DIAGNOSIS — H92.01 RIGHT EAR PAIN: Primary | ICD-10-CM

## 2018-12-03 DIAGNOSIS — H61.21 IMPACTED CERUMEN OF RIGHT EAR: ICD-10-CM

## 2018-12-03 RX ORDER — DIVALPROEX SODIUM 500 MG/1
500 TABLET, EXTENDED RELEASE ORAL 2 TIMES DAILY
COMMUNITY
Start: 2018-11-06

## 2018-12-03 RX ORDER — METHOCARBAMOL 500 MG/1
TABLET, FILM COATED ORAL
COMMUNITY
Start: 2018-10-30 | End: 2019-03-28 | Stop reason: SDUPTHER

## 2018-12-03 RX ORDER — DICLOFENAC SODIUM 75 MG/1
75 TABLET, DELAYED RELEASE ORAL 2 TIMES DAILY
Qty: 30 TAB | Refills: 0 | Status: SHIPPED | OUTPATIENT
Start: 2018-12-03 | End: 2019-07-18 | Stop reason: ALTCHOICE

## 2018-12-03 NOTE — PROGRESS NOTES
HISTORY OF PRESENT ILLNESS  Caleb Trammell is a 46 y.o. female. HPI   Pt presents with right ear pain    Pt states that 2 days ago, she was cleaning her right ear, and while she was doing this, she started to have pain in her right ear. There is some ringing in the ear  Pain is throbbing, per patient  No discharge, drainage from the ear  No nasal congestion, no fever, no headache  OTC; none  Review of Systems   Constitutional: Negative for fever. HENT: Positive for ear pain. Negative for congestion. Respiratory: Negative for cough. Gastrointestinal: Negative for diarrhea and vomiting. Physical Exam   Constitutional: She is oriented to person, place, and time. She appears well-developed and well-nourished. HENT:   Head: Normocephalic and atraumatic. Right Ear: External ear normal.   Left Ear: Hearing, tympanic membrane, external ear and ear canal normal.   Ears:    Nose: Nose normal.   Mouth/Throat: Oropharynx is clear and moist.   Neck: Normal range of motion. Neck supple. Cardiovascular: Normal rate, regular rhythm and normal heart sounds. Pulmonary/Chest: Effort normal and breath sounds normal.   Lymphadenopathy:     She has no cervical adenopathy. Neurological: She is alert and oriented to person, place, and time. Skin: Skin is warm and dry. Psychiatric: She has a normal mood and affect. Her behavior is normal.       ASSESSMENT and PLAN    ICD-10-CM ICD-9-CM    1. Right ear pain H92.01 388.70 REFERRAL TO ENT-OTOLARYNGOLOGY      diclofenac EC (VOLTAREN) 75 mg EC tablet   2. Impacted cerumen of right ear H61.21 380.4 REMOVE IMPACTED EAR WAX     Unable to see TM, even with light irrigation. Will send to ENT,  As worried about pain in ear that started with cleaning of the ear. Educated about calling today for an appointment ASAP. Pt informed to return to office with worsening of symptoms, or PRN with any questions or concerns.   Pt verbalizes understanding of plan of care and denies further questions or concerns at this time.

## 2018-12-03 NOTE — PATIENT INSTRUCTIONS
Earache: Care Instructions  Your Care Instructions    Even though infection is a common cause of ear pain, not all ear pain means an infection. If you have ear pain and don't have an infection, it could be because of a jaw problem, such as temporomandibular joint (TMJ) pain. Or it could be because of a neck problem. When ear discomfort or pain is mild or comes and goes without other symptoms, home treatment may be all you need. Follow-up care is a key part of your treatment and safety. Be sure to make and go to all appointments, and call your doctor if you are having problems. It's also a good idea to know your test results and keep a list of the medicines you take. How can you care for yourself at home? · Apply heat on the ear to ease pain. To apply heat, put a warm water bottle, a heating pad set on low, or a warm cloth on your ear. Do not go to sleep with a heating pad on your skin. · Take an over-the-counter pain medicine, such as acetaminophen (Tylenol), ibuprofen (Advil, Motrin), or naproxen (Aleve). Be safe with medicines. Read and follow all instructions on the label. · Do not take two or more pain medicines at the same time unless the doctor told you to. Many pain medicines have acetaminophen, which is Tylenol. Too much acetaminophen (Tylenol) can be harmful. · Never insert anything, such as a cotton swab or a amada pin, into the ear. When should you call for help? Call your doctor now or seek immediate medical care if:    · You have new or worse symptoms of infection, such as:  ? Increased pain, swelling, warmth, or redness. ? Red streaks leading from the area. ? Pus draining from the area. ? A fever.    Watch closely for changes in your health, and be sure to contact your doctor if:    · You have new or worse discharge coming from the ear.     · You do not get better as expected. Where can you learn more? Go to http://daisy-millicent.info/.   Enter G610 in the search box to learn more about \"Earache: Care Instructions. \"  Current as of: March 28, 2018  Content Version: 11.8  © 7200-5557 [employer]wise, [employer]. Care instructions adapted under license by [employer] (which disclaims liability or warranty for this information). If you have questions about a medical condition or this instruction, always ask your healthcare professional. [address] any warranty or liability for your use of this information.

## 2018-12-03 NOTE — PROGRESS NOTES
Identified pt with two pt identifiers(name and ). Chief Complaint   Patient presents with    Ear Pain     right ear        Health Maintenance Due   Topic    Pneumococcal 19-64 Highest Risk (3 of 3 - PCV13)    Shingrix Vaccine Age 50> (1 of 2)    BREAST CANCER SCRN MAMMOGRAM     FOBT Q 1 YEAR AGE 50-75     Influenza Age 5 to Adult        Wt Readings from Last 3 Encounters:   18 231 lb (104.8 kg)   18 220 lb (99.8 kg)   10/30/18 230 lb (104.3 kg)     Temp Readings from Last 3 Encounters:   10/30/18 98.7 °F (37.1 °C) (Oral)   10/29/18 98 °F (36.7 °C)   10/07/18 97.4 °F (36.3 °C)     BP Readings from Last 3 Encounters:   18 127/67   10/30/18 99/57   10/29/18 110/64     Pulse Readings from Last 3 Encounters:   18 70   10/30/18 77   10/29/18 62         Learning Assessment:  :     Learning Assessment 2013   PRIMARY LEARNER Patient Patient Patient   HIGHEST LEVEL OF EDUCATION - PRIMARY LEARNER  - - > 4 YEARS OF COLLEGE   BARRIERS PRIMARY LEARNER - - NONE   CO-LEARNER CAREGIVER - - No   PRIMARY LANGUAGE ENGLISH ENGLISH ENGLISH    NEED - - No   LEARNER PREFERENCE PRIMARY DEMONSTRATION READING LISTENING     - LISTENING -   ANSWERED BY patient patient self   RELATIONSHIP SELF SELF SELF   ASSESSMENT COMMENT - - no       Depression Screening:  :     PHQ over the last two weeks 2018   PHQ Not Done -   Little interest or pleasure in doing things Not at all   Feeling down, depressed, irritable, or hopeless Not at all   Total Score PHQ 2 0       Fall Risk Assessment:  :     Fall Risk Assessment, last 12 mths 2018   Able to walk? Yes   Fall in past 12 months? No       Abuse Screening:  :     Abuse Screening Questionnaire 2018 2017 8/15/2016   Do you ever feel afraid of your partner? N N N   Are you in a relationship with someone who physically or mentally threatens you? N N N   Is it safe for you to go home?  Isabella Tellez       Coordination of Care Questionnaire:  :     1) Have you been to an emergency room, urgent care clinic since your last visit? no   Hospitalized since your last visit? no             2) Have you seen or consulted any other health care providers outside of [address] [place] [address] since your last visit? no  (Include any pap smears or colon screenings in this section.)    3) Do you have an Advance Directive on file? no  Are you interested in receiving information about Advance Directives? no    Patient is accompanied by self. Reviewed record in preparation for visit and have obtained necessary documentation. Medication reconciliation up to date and corrected with patient at this time.

## 2018-12-26 ENCOUNTER — TELEPHONE (OUTPATIENT)
Dept: FAMILY MEDICINE CLINIC | Age: 48
End: 2018-12-26

## 2018-12-26 NOTE — TELEPHONE ENCOUNTER
Pt reports she is suffering from the same GI sx she has been experiencing since she first spoke to Rafy about in August 2018. Sx have not changed and are still present. Pt denies sx of fever and vomiting and has not been around any friends/family with GI illness, flu, etc. She was advised of need to call Dr. Sandra Hackett office/GI Specialist to schedule appt. She verbalized understanding.

## 2018-12-26 NOTE — TELEPHONE ENCOUNTER
Pt states she saw gastrologist stated everything seems to be fine and pt states still having gi issues and wants to know if she needs to see the gastro again or np brandon please call pt at [phone] and [phone]

## 2019-01-03 ENCOUNTER — HOSPITAL ENCOUNTER (OUTPATIENT)
Dept: ULTRASOUND IMAGING | Age: 49
Discharge: HOME | End: 2019-01-03
Attending: INTERNAL MEDICINE
Payer: MEDICAID

## 2019-01-03 DIAGNOSIS — R10.13 ABDOMINAL PAIN, EPIGASTRIC: ICD-10-CM

## 2019-01-03 PROCEDURE — 76700 US EXAM ABDOM COMPLETE: CPT

## 2019-01-04 DIAGNOSIS — G43.909 MIGRAINE WITHOUT STATUS MIGRAINOSUS, NOT INTRACTABLE, UNSPECIFIED MIGRAINE TYPE: ICD-10-CM

## 2019-01-04 RX ORDER — PROMETHAZINE HYDROCHLORIDE 50 MG/1
TABLET ORAL
Qty: 30 TAB | Refills: 3 | Status: SHIPPED | OUTPATIENT
Start: 2019-01-04 | End: 2019-05-14 | Stop reason: SDUPTHER

## 2019-01-04 NOTE — TELEPHONE ENCOUNTER
----- Message from Melisa Domingo sent at 1/4/2019  8:13 AM EST -----  Regarding: Dr. Tamara Carlson  Pt is calling for her headache medication sent to [facility],[phone]. Pt's contact is [phone]. Completely out.

## 2019-01-24 ENCOUNTER — OFFICE VISIT (OUTPATIENT)
Dept: NEUROLOGY | Age: 49
End: 2019-01-24

## 2019-01-24 VITALS
SYSTOLIC BLOOD PRESSURE: 100 MMHG | WEIGHT: 238 LBS | RESPIRATION RATE: 16 BRPM | DIASTOLIC BLOOD PRESSURE: 65 MMHG | HEIGHT: 66 IN | BODY MASS INDEX: 38.25 KG/M2 | OXYGEN SATURATION: 97 % | HEART RATE: 95 BPM

## 2019-01-24 DIAGNOSIS — G43.909 MIGRAINE WITHOUT STATUS MIGRAINOSUS, NOT INTRACTABLE, UNSPECIFIED MIGRAINE TYPE: ICD-10-CM

## 2019-01-24 RX ORDER — TOPIRAMATE 50 MG/1
150 TABLET, FILM COATED ORAL
Qty: 90 TAB | Refills: 5 | Status: SHIPPED | OUTPATIENT
Start: 2019-01-24 | End: 2019-10-10 | Stop reason: ALTCHOICE

## 2019-01-24 NOTE — PROGRESS NOTES
Neurology Progress Note    Patient ID:  Tavon Mensah  [id number]  46 y.o.  1970      Subjective:   History:  Jeff Kelly is a female who  has a past medical history of Back pain; Borderline personality disorder; Dysthymic disorder; Headache; Insomnia, PTSD, PUD (peptic ulcer disease); and Schizoaffective disorder (HCC). who has severe headache, R periorbital radiating to R temple and frontal area, 3-4/ week, lasting 4 hrs, stress seem to be a trigger, Imitrex works, has nausea, photophobia and phonophobia. Consideration include migraine, without visual auras, intractable which may be related to perimenopause and stress due to recent passing of father from a pedestrian accident. Patient also noted cognitive issues for 5 months described as jumbling up words concerning for mild cognitive impairment probably due to her psych medication and again stress from her dad's passing. Propranolol caused SOB and diarrhea. Zofran did not help.      Patient is now on Topamax 100 mg QHS and was doing better with 3-4 headaches/ month. However, for the past 1 month, noted increased frequency of headaches to 3-4/ week, needing to take more Imitrex which still works. Phenergan helps with nausea.  Admits to stress with taking care of mother, argument with neighbor, financial constraints and upcoming anniversary of father's death (late February)      ROS:  Per HPI-  Otherwise the remainder of ROS was negative    Social Hx  Social History     Socioeconomic History    Marital status: SINGLE     Spouse name: Not on file    Number of children: Not on file    Years of education: Not on file    Highest education level: Not on file   Tobacco Use    Smoking status: Former Smoker     Packs/day: 1.00     Years: 1.50     Pack years: 1.50     Types: Cigarettes    Smokeless tobacco: Never Used   Substance and Sexual Activity    Alcohol use: No    Drug use: No     Comment: 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  ziprasidone (GEODON) 80 mg capsule Take 80 mg by mouth once over twenty-four (24) hours.  trospium (SANCTURA) 20 mg tablet Take 20 mg by mouth once over twenty-four (24) hours.  clonazePAM (KLONOPIN) 1 mg tablet Take 1 mg by mouth three (3) times daily.  cloZAPine (CLOZARIL) 25 mg tablet Take 25 mg by mouth daily. In addition to 200 mg nightly pt also taking 25 mg at 7am and 25 mg at 5pm      dicyclomine (BENTYL) 20 mg tablet Take 1 Tab by mouth every six (6) hours as needed for up to 20 doses. 20 Tab 0    SUMAtriptan (IMITREX) 100 mg tablet TAKE ONE TABLET BY MOUTH AT ONSET OF HEADACHE. MAY REPEAT ONE TABLET AFTER 2 HOURS *MAXIMUM OF TWO TABLETS A DAY * 9 Tab 5    mirtazapine (REMERON) 30 mg tablet Take 30 mg by mouth nightly.  DULoxetine (CYMBALTA) 60 mg capsule Take 60 mg by mouth daily.  prazosin (MINIPRESS) 2 mg capsule Take 2 mg by mouth nightly.  benztropine (COGENTIN) 1 mg tablet Take 1 mg by mouth two (2) times a day.  cloZAPine (CLOZARIL) 100 mg tablet Take 200 mg by mouth nightly. 2 tabs @ hs      lithium carbonate 300 mg tablet Take 1 Tab by mouth daily. Indications: mood stabilization (Patient taking differently: Take 300 mg by mouth two (2) times a day. take one twice a day  Indications: mood stabilization) 7 Tab 0    methocarbamol (ROBAXIN) 500 mg tablet       diclofenac EC (VOLTAREN) 75 mg EC tablet Take 1 Tab by mouth two (2) times a day. 30 Tab 0     No current facility-administered medications on file prior to visit.         Imaging:    CT Results (recent):  Results from Hospital Encounter encounter on 10/29/18   CTA CHEST W OR W WO CONT Narrative Clinical indication: Chest pain and shortness of breath, acute. Localizer obtained without contrast at the level of the pulmonary arteries. Fast  injection rate of 100 cc of Isovue-370 with review of the raw data and MIP  reconstructions. No prior. CT dose reduction was achieved through the use of a  standardized protocol tailored for this examination and automatic exposure  control for dose modulation . Cachorro Braxton Small bilateral pleural effusions. No pulmonary emboli. No shift or  pneumothorax, no adenopathy or parenchymal mass. Axilla appear unremarkable. Impression IMPRESSION:    No pulmonary emboli. MRI Results (recent):  No results found for this or any previous visit. IR Results (recent):  No results found for this or any previous visit. VAS/US Results (recent):  No results found for this or any previous visit. Reviewed records in "[employer] and [employer] tab today    Lab Review     Admission on 10/29/2018, Discharged on 10/29/2018   Component Date Value Ref Range Status    Ventricular Rate 10/29/2018 71  BPM Final    Atrial Rate 10/29/2018 71  BPM Final    P-R Interval 10/29/2018 142  ms Final    QRS Duration 10/29/2018 82  ms Final    Q-T Interval 10/29/2018 416  ms Final    QTC Calculation (Bezet) 10/29/2018 452  ms Final    Calculated P Axis 10/29/2018 51  degrees Final    Calculated R Axis 10/29/2018 27  degrees Final    Calculated T Axis 10/29/2018 50  degrees Final    Diagnosis 10/29/2018    Final                    Value:Normal sinus rhythm  Low voltage QRS  Nonspecific T wave abnormality  Abnormal ECG  When compared with ECG of 06-JAN-2018 00:01,  No significant change was found  Confirmed by Carlota Byrne MD., Samantha ([id number]) on 10/29/2018 3:33:10 PM      SAMPLES BEING HELD 10/29/2018 STAT    Final    1BLUE,1RED    COMMENT 10/29/2018 Add-on orders for these samples will be processed based on acceptable specimen integrity and analyte stability, which may vary by analyte. Final    WBC 10/29/2018 9.5  3.6 - 11.0 K/uL Final    RBC 10/29/2018 3.65* 3.80 - 5.20 M/uL Final    HGB 10/29/2018 11.8  11.5 - 16.0 g/dL Final    HCT 10/29/2018 36.0  35.0 - 47.0 % Final    MCV 10/29/2018 98.6  80.0 - 99.0 FL Final    MCH 10/29/2018 32.3  26.0 - 34.0 PG Final    MCHC 10/29/2018 32.8  30.0 - 36.5 g/dL Final    RDW 10/29/2018 12.5  11.5 - 14.5 % Final    PLATELET 60/52/1150 458  150 - 400 K/uL Final    MPV 10/29/2018 9.9  8.9 - 12.9 FL Final    NEUTROPHILS 10/29/2018 67  32 - 75 % Final    LYMPHOCYTES 10/29/2018 25  12 - 49 % Final    MONOCYTES 10/29/2018 8  5 - 13 % Final    EOSINOPHILS 10/29/2018 0  0 - 7 % Final    BASOPHILS 10/29/2018 0  0 - 1 % Final    ABS. NEUTROPHILS 10/29/2018 6.2  1.8 - 8.0 K/UL Final    ABS. LYMPHOCYTES 10/29/2018 2.4  0.8 - 3.5 K/UL Final    ABS. MONOCYTES 10/29/2018 0.8  0.0 - 1.0 K/UL Final    ABS. EOSINOPHILS 10/29/2018 0.0  0.0 - 0.4 K/UL Final    ABS. BASOPHILS 10/29/2018 0.0  0.0 - 0.1 K/UL Final    DF 10/29/2018 AUTOMATED    Final    XXWBCSUS 10/29/2018 0    Final    Sodium 10/29/2018 139  136 - 145 mmol/L Final    Potassium 10/29/2018 4.0  3.5 - 5.1 mmol/L Final    Chloride 10/29/2018 104  97 - 108 mmol/L Final    CO2 10/29/2018 28  21 - 32 mmol/L Final    Anion gap 10/29/2018 7  5 - 15 mmol/L Final    Glucose 10/29/2018 91  65 - 100 mg/dL Final    BUN 10/29/2018 12  6 - 20 MG/DL Final    Creatinine 10/29/2018 1.10* 0.55 - 1.02 MG/DL Final    BUN/Creatinine ratio 10/29/2018 11* 12 - 20   Final    GFR est AA 10/29/2018 >60  >60 ml/min/1.73m2 Final    GFR est non-AA 10/29/2018 53* >60 ml/min/1.73m2 Final    Comment: Estimated GFR is calculated using the IDMS-traceable Modification of Diet in Renal Disease (MDRD) Study equation, reported for both  Americans (GFRAA) and non- Americans (GFRNA), and normalized to 1.73m2 body surface area. The physician must decide which value applies to the patient.   The MDRD study equation should only be used in individuals age 25 or older. It has not been validated for the following: pregnant women, patients with serious comorbid conditions, or on certain medications, or persons with extremes of body size, muscle mass, or nutritional status.  Calcium 10/29/2018 8.4* 8.5 - 10.1 MG/DL Final    Bilirubin, total 10/29/2018 0.3  0.2 - 1.0 MG/DL Final    ALT (SGPT) 10/29/2018 10* 12 - 78 U/L Final    AST (SGOT) 10/29/2018 14* 15 - 37 U/L Final    Alk. phosphatase 10/29/2018 62  45 - 117 U/L Final    Protein, total 10/29/2018 6.5  6.4 - 8.2 g/dL Final    Albumin 10/29/2018 3.5  3.5 - 5.0 g/dL Final    Globulin 10/29/2018 3.0  2.0 - 4.0 g/dL Final    A-G Ratio 10/29/2018 1.2  1.1 - 2.2   Final    Troponin-I, Qt. 10/29/2018 <0.05  <0.05 ng/mL Final    Comment: The presence of detectable troponin above the reference range indicates myocardial injury which may be due to ischemia, myocarditis, trauma, etc.  Clinical correlation is necessary to establish the significance of this finding. Sequential testing is recommended to determine if the typical rise and fall of cTnI is demonstrated. Note:  Cardiac troponin I has a relatively long half life and may be present well after the CK MB has returned to baseline. The reference range is based on the 99th percentile of the referent population.  Lipase 10/29/2018 153  73 - 393 U/L Final    Troponin-I (POC) 10/29/2018 <0.04  0.00 - 0.08 ng/mL Final    Comment: Notified RN or MD immediately by   (NOTE)  The presence of detectable troponin indicates myocardial injury which   may be due to ischemia, myocarditis, trauma, etc. Clinical   correlation is necessary to determine the significance of this   finding. Recommend establishing a baseline troponin using main   clinical laboratory method if serial determinations are anticipated.            Objective:       Exam:  Visit Vitals  /65 (BP 1 Location: Right arm, BP Patient Position: Sitting)   Pulse 95   Resp 16   Ht 5' 6\" (1.676 m)   Wt 108 kg (238 lb)   SpO2 97%   BMI 38.41 kg/m²     Gen: Awake, alert, follows commands  Appropriate appearance, normal speech. Oriented to all spheres. No visual field defect on confrontation exam.  Full eyes movement, with no nystagmus, no diplopia, no ptosis. Normal gag and swallow. All remaining cranial nerves were normal  Motor function: 5/5 in all extremities  Good FTN and HTS   Gait: Normal    Assessment:       ICD-10-CM ICD-9-CM    1. Migraine without status migrainosus, not intractable, unspecified migraine type G43.909 346.90 topiramate (TOPAMAX) 50 mg tablet           Plan:     1. Will increase Topamax 50 mg 3 tabs QHS for migraine prevention  2. Given samples of Cambia and Zipsor to alternate with Imitrex 100 mg prn to abort headaches. 3.Continue Phenergan 50 mg prn for nausea  4. Continue headache diary and went through the list that can trigger headache (stress)    Follow-up Disposition:  Return in about 2 months (around 3/24/2019).           Linda Dutton MD  Diplomate, American Board of Psychiatry and Neurology  Diplomate, Neuromuscular Medicine  Diplomate, American Board of Electrodiagnostic Medicine

## 2019-01-24 NOTE — LETTER
1/24/2019 3:23 PM 
 
Patient:  Bryant Gray YOB: 1970 Date of Visit: 1/24/2019 Dear Andie Govea, MARJORIE 
[address] [place] [address] [phone] VIA In Basket 
 : Thank you for referring Ms. Lilia Barbosa to me for evaluation/treatment. Below are the relevant portions of my assessment and plan of care. If you have questions, please do not hesitate to call me. I look forward to following Ms. Kelly along with you. Sincerely, Nisreen Franklin MD

## 2019-02-15 DIAGNOSIS — G43.909 MIGRAINE WITHOUT STATUS MIGRAINOSUS, NOT INTRACTABLE, UNSPECIFIED MIGRAINE TYPE: ICD-10-CM

## 2019-02-15 RX ORDER — SUMATRIPTAN 100 MG/1
TABLET, FILM COATED ORAL
Qty: 9 TAB | Refills: 4 | Status: SHIPPED | OUTPATIENT
Start: 2019-02-15 | End: 2019-09-13 | Stop reason: SDUPTHER

## 2019-03-28 ENCOUNTER — OFFICE VISIT (OUTPATIENT)
Dept: FAMILY MEDICINE CLINIC | Age: 49
End: 2019-03-28

## 2019-03-28 VITALS
HEIGHT: 66 IN | TEMPERATURE: 96.5 F | BODY MASS INDEX: 37.83 KG/M2 | RESPIRATION RATE: 18 BRPM | SYSTOLIC BLOOD PRESSURE: 109 MMHG | OXYGEN SATURATION: 99 % | WEIGHT: 235.4 LBS | DIASTOLIC BLOOD PRESSURE: 66 MMHG | HEART RATE: 91 BPM

## 2019-03-28 DIAGNOSIS — M54.50 ACUTE MIDLINE LOW BACK PAIN WITHOUT SCIATICA: Primary | ICD-10-CM

## 2019-03-28 RX ORDER — METHOCARBAMOL 500 MG/1
500 TABLET, FILM COATED ORAL 3 TIMES DAILY
Qty: 30 TAB | Refills: 0 | Status: SHIPPED | OUTPATIENT
Start: 2019-03-28 | End: 2019-04-01

## 2019-03-28 NOTE — PROGRESS NOTES
HISTORY OF PRESENT ILLNESS  Bryant Gray is a 46 y.o. female. HPI   Pt presents with \"back pain\"  Symptoms started last night  Pt states that she was sitting on her bed and when she went to lay down, she had extreme muscle tightness in the middle of her lower back. Pt states that the pain was so intense that she screamed out in pain. Pt states that since then, she has had the muscle tightness off and on. Spasming type pain  No radiation of the pain  No numbness in hands or feet. She has full ROM of back  No neck pain  OTC: Tylenol  Review of Systems   Constitutional: Negative for fever. HENT: Negative for congestion. Musculoskeletal: Positive for back pain. Physical Exam   Constitutional: She is oriented to person, place, and time. She appears well-developed and well-nourished. HENT:   Head: Normocephalic and atraumatic. Neck: Normal range of motion. Neck supple. Cardiovascular: Normal rate, regular rhythm and normal heart sounds. Pulmonary/Chest: Effort normal and breath sounds normal.   Musculoskeletal:        Lumbar back: She exhibits pain. She exhibits normal range of motion and no tenderness. Neurological: She is alert and oriented to person, place, and time. She has normal strength. Skin: Skin is warm and dry. Psychiatric: She has a normal mood and affect. Her behavior is normal.       ASSESSMENT and PLAN    ICD-10-CM ICD-9-CM    1. Acute midline low back pain without sciatica M54.5 724.2 methocarbamol (ROBAXIN) 500 mg tablet     Educated about trying Robaxin for tightness. Should pain not improve in 2-3 days, she should notify office  Educated about not driving while taking Robaxin    Pt informed to return to office with worsening of symptoms, or PRN with any questions or concerns. Pt verbalizes understanding of plan of care and denies further questions or concerns at this time.

## 2019-03-28 NOTE — PATIENT INSTRUCTIONS
Back Pain: Care Instructions  Your Care Instructions    Back pain has many possible causes. It is often related to problems with muscles and ligaments of the back. It may also be related to problems with the nerves, discs, or bones of the back. Moving, lifting, standing, sitting, or sleeping in an awkward way can strain the back. Sometimes you don't notice the injury until later. Arthritis is another common cause of back pain. Although it may hurt a lot, back pain usually improves on its own within several weeks. Most people recover in 12 weeks or less. Using good home treatment and being careful not to stress your back can help you feel better sooner. Follow-up care is a key part of your treatment and safety. Be sure to make and go to all appointments, and call your doctor if you are having problems. It's also a good idea to know your test results and keep a list of the medicines you take. How can you care for yourself at home? · Sit or lie in positions that are most comfortable and reduce your pain. Try one of these positions when you lie down:  ? Lie on your back with your knees bent and supported by large pillows. ? Lie on the floor with your legs on the seat of a sofa or chair. ? Lie on your side with your knees and hips bent and a pillow between your legs. ? Lie on your stomach if it does not make pain worse. · Do not sit up in bed, and avoid soft couches and twisted positions. Bed rest can help relieve pain at first, but it delays healing. Avoid bed rest after the first day of back pain. · Change positions every 30 minutes. If you must sit for long periods of time, take breaks from sitting. Get up and walk around, or lie in a comfortable position. · Try using a heating pad on a low or medium setting for 15 to 20 minutes every 2 or 3 hours. Try a warm shower in place of one session with the heating pad. · You can also try an ice pack for 10 to 15 minutes every 2 to 3 hours.  Put a thin cloth between the ice pack and your skin. · Take pain medicines exactly as directed. ? If the doctor gave you a prescription medicine for pain, take it as prescribed. ? If you are not taking a prescription pain medicine, ask your doctor if you can take an over-the-counter medicine. · Take short walks several times a day. You can start with 5 to 10 minutes, 3 or 4 times a day, and work up to longer walks. Walk on level surfaces and avoid hills and stairs until your back is better. · Return to work and other activities as soon as you can. Continued rest without activity is usually not good for your back. · To prevent future back pain, do exercises to stretch and strengthen your back and stomach. Learn how to use good posture, safe lifting techniques, and proper body mechanics. When should you call for help? Call your doctor now or seek immediate medical care if:    · You have new or worsening numbness in your legs.     · You have new or worsening weakness in your legs. (This could make it hard to stand up.)     · You lose control of your bladder or bowels.    Watch closely for changes in your health, and be sure to contact your doctor if:    · You have a fever, lose weight, or don't feel well.     · You do not get better as expected. Where can you learn more? Go to http://daisy-millicent.info/. Enter R044 in the search box to learn more about \"Back Pain: Care Instructions. \"  Current as of: September 20, 2018  Content Version: 11.9  © 5079-7459 [employer]. Care instructions adapted under license by [employer] (which disclaims liability or warranty for this information). If you have questions about a medical condition or this instruction, always ask your healthcare professional. [place] [address] any warranty or liability for your use of this information.

## 2019-03-28 NOTE — PROGRESS NOTES
Radha Herrera is a 46 y.o. female      Patient states hx of back pain  5 years agobut last night when lying down in bed pain started but when she got up the pain decreased . Patient unsure what caused her back problem to start up last night  Chief Complaint   Patient presents with    Back Pain     mid back pain x 1 day ago    Migraine       1. Have you been to the ER, urgent care clinic since your last visit? Hospitalized since your last visit? No  M  2. Have you seen or consulted any other health care providers outside of the [address] [place] [address] since your last visit? Include any pap smears or colon screening. No      Visit Vitals  /66 (BP 1 Location: Left arm, BP Patient Position: Sitting)   Pulse 91   Temp 96.5 °F (35.8 °C) (Oral)   Resp 18   Ht 5' 6\" (1.676 m)   Wt 235 lb 6.4 oz (106.8 kg)   SpO2 99%   BMI 37.99 kg/m²           Health Maintenance Due   Topic Date Due    Shingrix Vaccine Age 49> (1 of 2) 07/10/2018    BREAST CANCER SCRN MAMMOGRAM  07/10/2018    FOBT Q 1 YEAR AGE 50-75  07/10/2018    Influenza Age 5 to Adult  08/01/2018         Medication Reconciliation completed, changes noted.   Please  Update medication list.

## 2019-04-01 ENCOUNTER — APPOINTMENT (OUTPATIENT)
Dept: GENERAL RADIOLOGY | Age: 49
End: 2019-04-01
Attending: EMERGENCY MEDICINE
Payer: MEDICAID

## 2019-04-01 ENCOUNTER — HOSPITAL ENCOUNTER (EMERGENCY)
Age: 49
Discharge: HOME | End: 2019-04-01
Attending: EMERGENCY MEDICINE
Payer: MEDICAID

## 2019-04-01 ENCOUNTER — HOSPITAL ENCOUNTER (OUTPATIENT)
Dept: GENERAL RADIOLOGY | Age: 49
Discharge: HOME | End: 2019-04-01
Attending: NURSE PRACTITIONER
Payer: MEDICAID

## 2019-04-01 VITALS
OXYGEN SATURATION: 98 % | DIASTOLIC BLOOD PRESSURE: 75 MMHG | RESPIRATION RATE: 18 BRPM | HEART RATE: 87 BPM | SYSTOLIC BLOOD PRESSURE: 129 MMHG | TEMPERATURE: 98.1 F | BODY MASS INDEX: 32.62 KG/M2 | WEIGHT: 203 LBS | HEIGHT: 66 IN

## 2019-04-01 DIAGNOSIS — M54.50 ACUTE MIDLINE LOW BACK PAIN WITHOUT SCIATICA: Primary | ICD-10-CM

## 2019-04-01 DIAGNOSIS — S39.012A LUMBAR STRAIN, INITIAL ENCOUNTER: Primary | ICD-10-CM

## 2019-04-01 DIAGNOSIS — M54.50 ACUTE MIDLINE LOW BACK PAIN WITHOUT SCIATICA: ICD-10-CM

## 2019-04-01 PROCEDURE — 74011250637 HC RX REV CODE- 250/637: Performed by: EMERGENCY MEDICINE

## 2019-04-01 PROCEDURE — 99283 EMERGENCY DEPT VISIT LOW MDM: CPT

## 2019-04-01 PROCEDURE — 72100 X-RAY EXAM L-S SPINE 2/3 VWS: CPT

## 2019-04-01 PROCEDURE — 72070 X-RAY EXAM THORAC SPINE 2VWS: CPT

## 2019-04-01 RX ORDER — IBUPROFEN 600 MG/1
600 TABLET ORAL
Status: COMPLETED | OUTPATIENT
Start: 2019-04-01 | End: 2019-04-01

## 2019-04-01 RX ORDER — LIDOCAINE 50 MG/G
PATCH TOPICAL
Qty: 1 PACKAGE | Refills: 1 | Status: SHIPPED | OUTPATIENT
Start: 2019-04-01 | End: 2019-07-18 | Stop reason: ALTCHOICE

## 2019-04-01 RX ORDER — CYCLOBENZAPRINE HCL 10 MG
10 TABLET ORAL
Status: COMPLETED | OUTPATIENT
Start: 2019-04-01 | End: 2019-04-01

## 2019-04-01 RX ORDER — CYCLOBENZAPRINE HCL 10 MG
10 TABLET ORAL
Qty: 20 TAB | Refills: 0 | Status: SHIPPED | OUTPATIENT
Start: 2019-04-01 | End: 2019-07-18 | Stop reason: ALTCHOICE

## 2019-04-01 RX ADMIN — CYCLOBENZAPRINE HYDROCHLORIDE 10 MG: 10 TABLET, FILM COATED ORAL at 16:30

## 2019-04-01 RX ADMIN — IBUPROFEN 600 MG: 600 TABLET, FILM COATED ORAL at 16:30

## 2019-04-01 NOTE — ED TRIAGE NOTES
Patient ambulatory to ED treatment area with steady gait, for complaint of \"I hurt my back on Thursday and the pain is getting worse and worse. I went to lay down on Thursday and my back seized up and I fell back in the bed. I have taken advil and robaxin with some relief. \" Reports tingling and numbness to hands and arms.

## 2019-04-01 NOTE — ED NOTES
The patient was discharged home by Shruti Monteil. This RN was not present in room for DC or for reassessment

## 2019-04-01 NOTE — ED PROVIDER NOTES
This patient presents with pain going from the lower T-spine to the bottom of her L-spine. It is a spasm type pain. It started when she was sitting on her bed and then she went to lay down. She feels like it's muscular. She has seen her primary care provider once for it. She had x-rays earlier today as an outpatient. She has called her primary care provider back. She feels like the Robaxin that they prescribed is not helping. She is taking 400 mg of ibuprofen 3 times a day which does not help much. Her last dose was 5 hours ago. No history of IV drug use ever. No unexplained fever. No numbness to the legs. No focal weakness. Hurts to twist. No loss of bowel or bladder function. No symptoms of urinary retention. No abdominal pain. She is a nonsmoker. She has taken Flexeril in the past which has helped her. She is compliant with all of her other medication. Old chart reviewed - had normal L spine xrays today. Ordered by PCP. Past Medical History:  
Diagnosis Date  Back pain  Borderline personality disorder ([address]  Dysthymic disorder  Headache  Insomnia, unspecified  Post traumatic stress disorder (PTSD)  Psychosis ([address]  PUD (peptic ulcer disease)  Schizoaffective disorder ([facility] Past Surgical History:  
Procedure Laterality Date  COLONOSCOPY N/A 11/8/2018 COLONOSCOPY performed by Paco English MD at [address] [facility]  2012 [address] Ulcer surgery of stomach Family History:  
Problem Relation Age of Onset  Hypertension Father  Psychiatric Disorder Father ETOH  Coronary Artery Disease Father [facility] Arthritis-osteo Mother  Thyroid Disease Mother  Stroke Maternal Grandmother  Malignant Hyperthermia Neg Hx  Pseudocholinesterase Deficiency Neg Hx  Delayed Awakening Neg Hx  Post-op Nausea/Vomiting Neg Hx  Emergence Delirium Neg Hx  Post-op Cognitive Dysfunction Neg Hx   
 Other Neg Hx Social History Socioeconomic History  Marital status: SINGLE Spouse name: Not on file  Number of children: Not on file  Years of education: Not on file  Highest education level: Not on file Occupational History  Not on file Social Needs  Financial resource strain: Not on file  Food insecurity:  
  Worry: Not on file Inability: Not on file  Transportation needs:  
  Medical: Not on file Non-medical: Not on file Tobacco Use  Smoking status: Former Smoker Packs/day: 1.00 Years: 1.50 Pack years: 1.50 Types: Cigarettes  Smokeless tobacco: Never Used Substance and Sexual Activity  Alcohol use: No  
 Drug use: No  
  Comment: 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  Sexual activity: Never Lifestyle  Physical activity:  
  Days per week: Not on file Minutes per session: Not on file  Stress: Not on file Relationships  Social connections:  
  Talks on phone: Not on file Gets together: Not on file Attends Scientologist service: Not on file Active member of club or organization: Not on file Attends meetings of clubs or organizations: Not on file Relationship status: Not on file  Intimate partner violence:  
  Fear of current or ex partner: Not on file Emotionally abused: Not on file Physically abused: Not on file Forced sexual activity: Not on file Other Topics Concern  Not on file Social History Narrative  Not on file ALLERGIES: Penicillin g and Aspirin Review of Systems Vitals:  
 04/01/19 1559 BP: 129/75 Pulse: 87 Resp: 18 Temp: 98.1 °F (36.7 °C) SpO2: 98% Weight: 92.1 kg (203 lb) Height: 5' 6\" (1.676 m) Physical Exam  
Constitutional: She appears well-developed and well-nourished. No distress. HENT:  
Head: Normocephalic.   
Mouth/Throat: Oropharynx is clear and moist.  
 Eyes: Pupils are equal, round, and reactive to light. Conjunctivae are normal.  
Neck: No tracheal deviation present. Cardiovascular: Normal rate, regular rhythm and intact distal pulses. Pulmonary/Chest: Effort normal and breath sounds normal.  
Abdominal: Soft. There is no tenderness. Musculoskeletal: She exhibits no edema. Neurological: She has normal strength. No sensory deficit. Coordination and gait normal.  
Reflex Scores: 
     Patellar reflexes are 1+ on the right side and 1+ on the left side. Achilles reflexes are 2+ on the right side and 2+ on the left side. Skin: Skin is warm and dry. She is not diaphoretic. MDM Procedures Reviewed records Pt has appt with [facility] in 8-9 days Added T spine xrays due to location of pain. Reviewed xrays Will stop robaxin Start flexeril Bump up ibuprofen to 600 mg qid prn

## 2019-04-02 NOTE — PROGRESS NOTES
Please call patient and let her know that her x-ray showed some degenerative changes, but nothing acute.   Should follow up with ortho should pain continue  Thanks

## 2019-04-08 ENCOUNTER — TELEPHONE (OUTPATIENT)
Dept: NEUROLOGY | Age: 49
End: 2019-04-08

## 2019-04-08 NOTE — TELEPHONE ENCOUNTER
----- Message from John Paul Olsen sent at 4/8/2019 12:53 PM EDT -----  Regarding: Dr. Devi Hdez  Pt requesting for another refill  of medication \"Sumatriotan\"  100 MG to be sent to [address] [place] [address] [phone]. Pt has misplace this medication. Only has one day of medicine left.   Best contact number [phone] alternated number [phone]

## 2019-04-08 NOTE — TELEPHONE ENCOUNTER
Called and spoke to patient   And let her know there is refills left on her script   Patient states understanding

## 2019-04-29 ENCOUNTER — TELEPHONE (OUTPATIENT)
Dept: FAMILY MEDICINE CLINIC | Age: 49
End: 2019-04-29

## 2019-04-29 NOTE — TELEPHONE ENCOUNTER
Pt stated she was referred to an obgyn for a boil and can't remember doctors name - did not see anything in pt's chart    Contact pt at [phone]

## 2019-05-14 DIAGNOSIS — G43.909 MIGRAINE WITHOUT STATUS MIGRAINOSUS, NOT INTRACTABLE, UNSPECIFIED MIGRAINE TYPE: ICD-10-CM

## 2019-05-15 RX ORDER — PROMETHAZINE HYDROCHLORIDE 50 MG/1
TABLET ORAL
Qty: 30 TAB | Refills: 2 | Status: SHIPPED | OUTPATIENT
Start: 2019-05-15 | End: 2019-09-13 | Stop reason: SDUPTHER

## 2019-07-18 ENCOUNTER — OFFICE VISIT (OUTPATIENT)
Dept: FAMILY MEDICINE CLINIC | Age: 49
End: 2019-07-18

## 2019-07-18 VITALS
HEART RATE: 84 BPM | SYSTOLIC BLOOD PRESSURE: 105 MMHG | DIASTOLIC BLOOD PRESSURE: 56 MMHG | RESPIRATION RATE: 18 BRPM | TEMPERATURE: 98.1 F | OXYGEN SATURATION: 99 % | BODY MASS INDEX: 36.22 KG/M2 | HEIGHT: 66 IN | WEIGHT: 225.4 LBS

## 2019-07-18 DIAGNOSIS — Z13.29 SCREENING FOR THYROID DISORDER: ICD-10-CM

## 2019-07-18 DIAGNOSIS — G47.00 INSOMNIA, UNSPECIFIED TYPE: Primary | ICD-10-CM

## 2019-07-18 RX ORDER — HYDROXYZINE PAMOATE 50 MG/1
CAPSULE ORAL
COMMUNITY
Start: 2019-06-26 | End: 2019-12-03

## 2019-07-18 RX ORDER — DIAZEPAM 10 MG/1
TABLET ORAL 3 TIMES DAILY
COMMUNITY
Start: 2019-07-09

## 2019-07-18 NOTE — PROGRESS NOTES
All health maintenance and other pertinent information has been reviewed in preparation for today's office visit. Patient presents in the office today for:    Chief Complaint   Patient presents with    Insomnia     1. Have you been to the ER, urgent care clinic since your last visit? Hospitalized since your last visit? No    2. Have you seen or consulted any other health care providers outside of the [address] [place] [address] since your last visit? Include any pap smears or colon screening.  No

## 2019-07-18 NOTE — PROGRESS NOTES
HISTORY OF PRESENT ILLNESS  Abrahan Bey is a 44 y.o. female. HPI   Pt presents with \"insomnia\"  Pt states that she has been dealing with some insomnia for about 2 weeks  She has been unable to fall asleep until Midnight, and then wakes up around noon. When she sleeps, she feels like she actually does sleep. She has not had any recent changes in her psych medications. No snoring at night. Review of Systems   Constitutional: Negative for fever. HENT: Negative for congestion. Gastrointestinal: Negative for diarrhea and vomiting. Psychiatric/Behavioral: The patient has insomnia. Physical Exam   Constitutional: She is oriented to person, place, and time. She appears well-developed and well-nourished. HENT:   Head: Normocephalic and atraumatic. Cardiovascular: Normal rate, regular rhythm and normal heart sounds. Pulmonary/Chest: Effort normal and breath sounds normal.   Neurological: She is alert and oriented to person, place, and time. Skin: Skin is warm and dry. Psychiatric: She has a normal mood and affect. Her behavior is normal.       ASSESSMENT and PLAN    ICD-10-CM ICD-9-CM    1. Insomnia, unspecified type G47.00 780.52    2. Screening for thyroid disorder Z13.29 V77.0 THYROID CASCADE PROFILE     Educated that we will notify her when labs return, and inform her of any change in plan of care at that time  Educated that I am not comfortable giving a sleep aid, due to her other medications she is currently on    Pt informed to return to office with worsening of symptoms, or PRN with any questions or concerns. Pt verbalizes understanding of plan of care and denies further questions or concerns at this time.

## 2019-07-18 NOTE — PATIENT INSTRUCTIONS

## 2019-07-19 LAB — TSH SERPL DL<=0.005 MIU/L-ACNC: 4.29 UIU/ML (ref 0.45–4.5)

## 2019-07-19 NOTE — PROGRESS NOTES
Please call patient and let her know that her labs returned and are stable.   Should she want a referral to sleep center, happy to place  Thanks

## 2019-07-24 ENCOUNTER — TELEPHONE (OUTPATIENT)
Dept: FAMILY MEDICINE CLINIC | Age: 49
End: 2019-07-24

## 2019-09-13 DIAGNOSIS — G43.909 MIGRAINE WITHOUT STATUS MIGRAINOSUS, NOT INTRACTABLE, UNSPECIFIED MIGRAINE TYPE: ICD-10-CM

## 2019-09-13 RX ORDER — PROMETHAZINE HYDROCHLORIDE 50 MG/1
TABLET ORAL
Qty: 30 TAB | Refills: 0 | Status: SHIPPED | OUTPATIENT
Start: 2019-09-13 | End: 2019-12-03 | Stop reason: SDUPTHER

## 2019-09-13 RX ORDER — SUMATRIPTAN 100 MG/1
TABLET, FILM COATED ORAL
Qty: 9 TAB | Refills: 0 | Status: SHIPPED | OUTPATIENT
Start: 2019-09-13 | End: 2019-10-25 | Stop reason: SDUPTHER

## 2019-09-13 NOTE — TELEPHONE ENCOUNTER
Patient was last seen 1/24/2019 and was told to f/u in 2 months. 1 month refills sent with a message to pharmacy to have patient call our office to schedule an appt. Also, sent a Myze message to patient.

## 2019-09-13 NOTE — TELEPHONE ENCOUNTER
Pt is requesting refills on the following medications  Pt is completely out of both  Requested Prescriptions     Pending Prescriptions Disp Refills    SUMAtriptan (IMITREX) 100 mg tablet 9 Tab 4     Sig: TAKE 1 TABLET BY MOUTH AT ONSET OF HEADACHE. MAY REPEAT ONE TABLET AFTER 2 HOURS.  MAXIMUM 2 TABLETS A DAY    promethazine (PHENERGAN) 50 mg tablet 30 Tab 2     Sig: TAKE ONE-HALF TO ONE TABLET BY MOUTH DAILY AS NEEDED FOR NAUSEA/VOMITING

## 2019-09-18 ENCOUNTER — OFFICE VISIT (OUTPATIENT)
Dept: FAMILY MEDICINE CLINIC | Age: 49
End: 2019-09-18

## 2019-09-18 VITALS
BODY MASS INDEX: 35.03 KG/M2 | SYSTOLIC BLOOD PRESSURE: 101 MMHG | DIASTOLIC BLOOD PRESSURE: 64 MMHG | OXYGEN SATURATION: 99 % | HEART RATE: 83 BPM | WEIGHT: 218 LBS | HEIGHT: 66 IN | RESPIRATION RATE: 16 BRPM | TEMPERATURE: 98.5 F

## 2019-09-18 DIAGNOSIS — M54.50 ACUTE MIDLINE LOW BACK PAIN WITHOUT SCIATICA: Primary | ICD-10-CM

## 2019-09-18 RX ORDER — METHYLPREDNISOLONE 4 MG/1
TABLET ORAL
Qty: 1 DOSE PACK | Refills: 0 | Status: SHIPPED | OUTPATIENT
Start: 2019-09-18 | End: 2019-10-10 | Stop reason: ALTCHOICE

## 2019-09-18 RX ORDER — DICLOFENAC SODIUM 75 MG/1
75 TABLET, DELAYED RELEASE ORAL 2 TIMES DAILY
Qty: 30 TAB | Refills: 0 | Status: SHIPPED | OUTPATIENT
Start: 2019-09-18

## 2019-09-18 NOTE — PATIENT INSTRUCTIONS
Back Pain: Care Instructions  Your Care Instructions    Back pain has many possible causes. It is often related to problems with muscles and ligaments of the back. It may also be related to problems with the nerves, discs, or bones of the back. Moving, lifting, standing, sitting, or sleeping in an awkward way can strain the back. Sometimes you don't notice the injury until later. Arthritis is another common cause of back pain. Although it may hurt a lot, back pain usually improves on its own within several weeks. Most people recover in 12 weeks or less. Using good home treatment and being careful not to stress your back can help you feel better sooner. Follow-up care is a key part of your treatment and safety. Be sure to make and go to all appointments, and call your doctor if you are having problems. It's also a good idea to know your test results and keep a list of the medicines you take. How can you care for yourself at home? · Sit or lie in positions that are most comfortable and reduce your pain. Try one of these positions when you lie down:  ? Lie on your back with your knees bent and supported by large pillows. ? Lie on the floor with your legs on the seat of a sofa or chair. ? Lie on your side with your knees and hips bent and a pillow between your legs. ? Lie on your stomach if it does not make pain worse. · Do not sit up in bed, and avoid soft couches and twisted positions. Bed rest can help relieve pain at first, but it delays healing. Avoid bed rest after the first day of back pain. · Change positions every 30 minutes. If you must sit for long periods of time, take breaks from sitting. Get up and walk around, or lie in a comfortable position. · Try using a heating pad on a low or medium setting for 15 to 20 minutes every 2 or 3 hours. Try a warm shower in place of one session with the heating pad. · You can also try an ice pack for 10 to 15 minutes every 2 to 3 hours.  Put a thin cloth between the ice pack and your skin. · Take pain medicines exactly as directed. ? If the doctor gave you a prescription medicine for pain, take it as prescribed. ? If you are not taking a prescription pain medicine, ask your doctor if you can take an over-the-counter medicine. · Take short walks several times a day. You can start with 5 to 10 minutes, 3 or 4 times a day, and work up to longer walks. Walk on level surfaces and avoid hills and stairs until your back is better. · Return to work and other activities as soon as you can. Continued rest without activity is usually not good for your back. · To prevent future back pain, do exercises to stretch and strengthen your back and stomach. Learn how to use good posture, safe lifting techniques, and proper body mechanics. When should you call for help? Call your doctor now or seek immediate medical care if:    · You have new or worsening numbness in your legs.     · You have new or worsening weakness in your legs. (This could make it hard to stand up.)     · You lose control of your bladder or bowels.    Watch closely for changes in your health, and be sure to contact your doctor if:    · You have a fever, lose weight, or don't feel well.     · You do not get better as expected. Where can you learn more? Go to http://daisy-millicent.info/. Enter U558 in the search box to learn more about \"Back Pain: Care Instructions. \"  Current as of: September 20, 2018  Content Version: 12.1  © 2738-0246 [employer] Care instructions adapted under license by [employer] (which disclaims liability or warranty for this information). If you have questions about a medical condition or this instruction, always ask your healthcare professional. [place] [address] any warranty or liability for your use of this information.

## 2019-09-18 NOTE — PROGRESS NOTES
Identified pt with two pt identifiers(name and ). Chief Complaint   Patient presents with    Back Pain     lumbar region and mid center of back - was lifting her bedridden mother who weighs 200+ pds on this past Friday and felt something tear    Depression     pt sees Dr. Louie Brennan for depression        Health Maintenance Due   Topic    Shingrix Vaccine Age 50> (1 of 2)    BREAST CANCER SCRN MAMMOGRAM     FOBT Q 1 YEAR AGE 50-75     Influenza Age 5 to Adult        Wt Readings from Last 3 Encounters:   19 218 lb (98.9 kg)   19 225 lb 6.4 oz (102.2 kg)   19 203 lb (92.1 kg)     Temp Readings from Last 3 Encounters:   19 98.5 °F (36.9 °C) (Oral)   19 98.1 °F (36.7 °C) (Oral)   19 98.1 °F (36.7 °C)     BP Readings from Last 3 Encounters:   19 101/64   19 105/56   19 129/75     Pulse Readings from Last 3 Encounters:   19 83   19 84   19 87         Learning Assessment:  :     Learning Assessment 2013   PRIMARY LEARNER Patient Patient Patient   HIGHEST LEVEL OF EDUCATION - PRIMARY LEARNER  - - > 4 YEARS OF COLLEGE   BARRIERS PRIMARY LEARNER - - NONE   CO-LEARNER CAREGIVER - - No   PRIMARY LANGUAGE ENGLISH ENGLISH ENGLISH    NEED - - No   LEARNER PREFERENCE PRIMARY DEMONSTRATION READING LISTENING     - LISTENING -   ANSWERED BY patient patient self   RELATIONSHIP SELF SELF SELF   ASSESSMENT COMMENT - - no       Depression Screening:  :     3 most recent PHQ Screens 2019   PHQ Not Done -   Little interest or pleasure in doing things Several days   Feeling down, depressed, irritable, or hopeless Several days   Total Score PHQ 2 2       Fall Risk Assessment:  :     Fall Risk Assessment, last 12 mths 2018   Able to walk? Yes   Fall in past 12 months? No       Abuse Screening:  :     Abuse Screening Questionnaire 2019 2018 2017 8/15/2016   Do you ever feel afraid of your partner?  Issac Au N N   Are you in a relationship with someone who physically or mentally threatens you? N N N N   Is it safe for you to go home? Y Y Y N       Coordination of Care Questionnaire:  :     1) Have you been to an emergency room, urgent care clinic since your last visit? no   Hospitalized since your last visit? no             2) Have you seen or consulted any other health care providers outside of [address] [place] [address] since your last visit? yes, has seen Dr. Senait Connelly in the past few weeks  (Include any pap smears or colon screenings in this section.)    3) Do you have an Advance Directive on file? no  Are you interested in receiving information about Advance Directives? no    Patient is accompanied by self. Reviewed record in preparation for visit and have obtained necessary documentation. Medication reconciliation up to date and corrected with patient at this time.

## 2019-09-18 NOTE — PROGRESS NOTES
HISTORY OF PRESENT ILLNESS  Griffin Dumont is a 44 y.o. female. HPI   Pt presents with \"back pain\"  Pt states that she was lifting her mother a couple of days ago, and felt something \"pop\" in her middle back  Pt states that since then, she has continued to have pain  Pain is in center of middle and lower back  Pain is spasming as well as aching  No numbness or tingling in hands or feet  She has no radiation of pain  OTC: ibuprofen  Review of Systems   Constitutional: Negative for fever. HENT: Negative for congestion. Gastrointestinal: Negative for diarrhea and vomiting. Musculoskeletal: Positive for back pain. Physical Exam   Constitutional: She is oriented to person, place, and time. She appears well-developed and well-nourished. HENT:   Head: Normocephalic and atraumatic. Cardiovascular: Normal rate, regular rhythm and normal heart sounds. Pulmonary/Chest: Effort normal and breath sounds normal.   Musculoskeletal:        Lumbar back: She exhibits normal range of motion, no bony tenderness and no swelling. Neurological: She is alert and oriented to person, place, and time. She has normal strength. Skin: Skin is warm and dry. Psychiatric: She has a normal mood and affect. Her behavior is normal.       ASSESSMENT and PLAN    ICD-10-CM ICD-9-CM    1. Acute midline low back pain without sciatica M54.5 724.2 methylPREDNISolone (MEDROL DOSEPACK) 4 mg tablet      diclofenac EC (VOLTAREN) 75 mg EC tablet     Educated about taking medications as prescribed. Pt informed to return to office with worsening of symptoms, or PRN with any questions or concerns. Pt verbalizes understanding of plan of care and denies further questions or concerns at this time.

## 2019-09-23 ENCOUNTER — TELEPHONE (OUTPATIENT)
Dept: FAMILY MEDICINE CLINIC | Age: 49
End: 2019-09-23

## 2019-09-23 NOTE — TELEPHONE ENCOUNTER
Pt came in on 9/18/19 and was seen for back and stated not better and medication given did not seem to work and made stomach hurt  Call pt at [phone] or [phone]

## 2019-09-24 DIAGNOSIS — M54.50 ACUTE MIDLINE LOW BACK PAIN WITHOUT SCIATICA: Primary | ICD-10-CM

## 2019-09-24 NOTE — TELEPHONE ENCOUNTER
Pt was advised and verbalized understanding. She reports her back is feeling much better today so she probably will not have the xray done.

## 2019-09-24 NOTE — TELEPHONE ENCOUNTER
I have placed order for back x-ray.   She should get this done,and we will notify her of result  Thanks

## 2019-10-10 ENCOUNTER — OFFICE VISIT (OUTPATIENT)
Dept: FAMILY MEDICINE CLINIC | Age: 49
End: 2019-10-10

## 2019-10-10 VITALS
DIASTOLIC BLOOD PRESSURE: 82 MMHG | BODY MASS INDEX: 35.52 KG/M2 | WEIGHT: 221 LBS | OXYGEN SATURATION: 98 % | RESPIRATION RATE: 18 BRPM | TEMPERATURE: 98 F | HEIGHT: 66 IN | SYSTOLIC BLOOD PRESSURE: 126 MMHG | HEART RATE: 89 BPM

## 2019-10-10 DIAGNOSIS — J06.9 URI WITH COUGH AND CONGESTION: Primary | ICD-10-CM

## 2019-10-10 RX ORDER — DOXYCYCLINE 100 MG/1
100 TABLET ORAL 2 TIMES DAILY
Qty: 20 TAB | Refills: 0 | Status: SHIPPED | OUTPATIENT
Start: 2019-10-10 | End: 2019-10-20

## 2019-10-10 RX ORDER — BENZONATATE 100 MG/1
100 CAPSULE ORAL
Qty: 21 CAP | Refills: 0 | Status: SHIPPED | OUTPATIENT
Start: 2019-10-10 | End: 2019-10-17

## 2019-10-10 NOTE — PROGRESS NOTES
HISTORY OF PRESENT ILLNESS  Breonna Kamara is a 44 y.o. female. HPI   Pt presents with \"cold symptoms:\"  Symptoms started 6 days ago  Sore throat  Nasal congestion  Pounding headache  Cough with congestion  OTC: MucinexMeghan  Off and on fever  Review of Systems   Constitutional: Positive for fever. HENT: Positive for congestion, sinus pain and sore throat. Respiratory: Positive for cough and sputum production. Gastrointestinal: Negative for diarrhea and vomiting. Physical Exam   Constitutional: She is oriented to person, place, and time. She appears well-developed and well-nourished. HENT:   Head: Normocephalic and atraumatic. Right Ear: Hearing, tympanic membrane, external ear and ear canal normal.   Left Ear: Hearing, tympanic membrane, external ear and ear canal normal.   Nose: Mucosal edema present. Mouth/Throat: Oropharynx is clear and moist.   Neck: Normal range of motion. Neck supple. Cardiovascular: Normal rate, regular rhythm and normal heart sounds. Pulmonary/Chest: Effort normal. She has rhonchi in the right lower field and the left lower field. Lymphadenopathy:     She has no cervical adenopathy. Neurological: She is alert and oriented to person, place, and time. Skin: Skin is warm and dry. Psychiatric: She has a normal mood and affect. Her behavior is normal.       ASSESSMENT and PLAN    ICD-10-CM ICD-9-CM    1. URI with cough and congestion J06.9 465.9 benzonatate (TESSALON PERLES) 100 mg capsule      doxycycline (ADOXA) 100 mg tablet     Educated about taking medication, as prescribed, with food  Should stay well hydrated, and treat fever as needed    Pt informed to return to office with worsening of symptoms, or PRN with any questions or concerns. Pt verbalizes understanding of plan of care and denies further questions or concerns at this time.

## 2019-10-10 NOTE — PATIENT INSTRUCTIONS

## 2019-10-10 NOTE — PROGRESS NOTES
Identified pt with two pt identifiers(name and ). Chief Complaint   Patient presents with    Cough        Health Maintenance Due   Topic    Shingrix Vaccine Age 50> (1 of 2)    BREAST CANCER SCRN MAMMOGRAM     FOBT Q 1 YEAR AGE 50-75        Wt Readings from Last 3 Encounters:   10/10/19 221 lb (100.2 kg)   19 218 lb (98.9 kg)   19 225 lb 6.4 oz (102.2 kg)     Temp Readings from Last 3 Encounters:   10/10/19 98 °F (36.7 °C) (Oral)   19 98.5 °F (36.9 °C) (Oral)   19 98.1 °F (36.7 °C) (Oral)     BP Readings from Last 3 Encounters:   10/10/19 126/82   19 101/64   19 105/56     Pulse Readings from Last 3 Encounters:   10/10/19 89   19 83   19 84         Learning Assessment:  :     Learning Assessment 2013   PRIMARY LEARNER Patient Patient Patient   HIGHEST LEVEL OF EDUCATION - PRIMARY LEARNER  - - > 4 YEARS OF COLLEGE   BARRIERS PRIMARY LEARNER - - NONE   CO-LEARNER CAREGIVER - - No   PRIMARY LANGUAGE ENGLISH ENGLISH ENGLISH    NEED - - No   LEARNER PREFERENCE PRIMARY DEMONSTRATION READING LISTENING     - LISTENING -   ANSWERED BY patient patient self   RELATIONSHIP SELF SELF SELF   ASSESSMENT COMMENT - - no       Depression Screening:  :     3 most recent PHQ Screens 2019   PHQ Not Done -   Little interest or pleasure in doing things Several days   Feeling down, depressed, irritable, or hopeless Several days   Total Score PHQ 2 2       Fall Risk Assessment:  :     Fall Risk Assessment, last 12 mths 2018   Able to walk? Yes   Fall in past 12 months? No       Abuse Screening:  :     Abuse Screening Questionnaire 2019 2018 2017 8/15/2016   Do you ever feel afraid of your partner? N N N N   Are you in a relationship with someone who physically or mentally threatens you? N N N N   Is it safe for you to go home?  Gasper Davila       Coordination of Care Questionnaire:  :     1) Have you been to an emergency room, urgent care clinic since your last visit? no   Hospitalized since your last visit? no             2) Have you seen or consulted any other health care providers outside of [address] [place] [address] since your last visit? no  (Include any pap smears or colon screenings in this section.)    3) Do you have an Advance Directive on file? no  Are you interested in receiving information about Advance Directives? no      Reviewed record in preparation for visit and have obtained necessary documentation. Medication reconciliation up to date and corrected with patient at this time.

## 2019-10-24 ENCOUNTER — TELEPHONE (OUTPATIENT)
Dept: NEUROLOGY | Age: 49
End: 2019-10-24

## 2019-10-24 DIAGNOSIS — G43.909 MIGRAINE WITHOUT STATUS MIGRAINOSUS, NOT INTRACTABLE, UNSPECIFIED MIGRAINE TYPE: ICD-10-CM

## 2019-10-24 NOTE — TELEPHONE ENCOUNTER
Patient doesn't see Dr. Petty Canas until 11/4/19. She is completely out of her Imitrex and is requesting a few pills to hold her over until then.

## 2019-10-25 RX ORDER — SUMATRIPTAN 100 MG/1
TABLET, FILM COATED ORAL
Qty: 9 TAB | Refills: 3 | Status: SHIPPED | OUTPATIENT
Start: 2019-10-25 | End: 2019-12-02 | Stop reason: SDUPTHER

## 2019-10-29 ENCOUNTER — OFFICE VISIT (OUTPATIENT)
Dept: FAMILY MEDICINE CLINIC | Age: 49
End: 2019-10-29

## 2019-10-29 ENCOUNTER — TELEPHONE (OUTPATIENT)
Dept: FAMILY MEDICINE CLINIC | Age: 49
End: 2019-10-29

## 2019-10-29 ENCOUNTER — HOSPITAL ENCOUNTER (OUTPATIENT)
Dept: ULTRASOUND IMAGING | Age: 49
Discharge: HOME | End: 2019-10-29
Attending: NURSE PRACTITIONER
Payer: MEDICAID

## 2019-10-29 VITALS
TEMPERATURE: 98.4 F | HEART RATE: 104 BPM | BODY MASS INDEX: 35.52 KG/M2 | OXYGEN SATURATION: 100 % | SYSTOLIC BLOOD PRESSURE: 131 MMHG | HEIGHT: 66 IN | DIASTOLIC BLOOD PRESSURE: 72 MMHG | RESPIRATION RATE: 18 BRPM | WEIGHT: 221 LBS

## 2019-10-29 DIAGNOSIS — M71.22 SYNOVIAL CYST OF LEFT POPLITEAL SPACE: Primary | ICD-10-CM

## 2019-10-29 DIAGNOSIS — M25.562 ACUTE PAIN OF LEFT KNEE: ICD-10-CM

## 2019-10-29 DIAGNOSIS — M25.562 ACUTE PAIN OF LEFT KNEE: Primary | ICD-10-CM

## 2019-10-29 PROCEDURE — 93971 EXTREMITY STUDY: CPT

## 2019-10-29 RX ORDER — DULOXETIN HYDROCHLORIDE 60 MG/1
60 CAPSULE, DELAYED RELEASE ORAL
COMMUNITY
Start: 2019-10-02

## 2019-10-29 NOTE — TELEPHONE ENCOUNTER
Per NP, Ab pt was advised via detailed TM that she has a baker's cyst and needs to see ortho/Dr. Tra Man that she was referred to in today's office visit. She was advised to call and schedule appt with Dr. Tra Man for evaluation. As for pain control she was advised to use OTC ibuprofen to control pain.

## 2019-10-29 NOTE — PROGRESS NOTES
Please call patient and let her know that [address] returned. NO DVT. She does have bakers cyst behind  Left knee, that could be causing pain. Should be seen by ortho to discuss this, Dr Jennifer Bella, [phone]  Thanks!

## 2019-10-29 NOTE — PROGRESS NOTES
Per NP, Ab pt was advised via detailed TM that she has a baker's cyst and needs to see ortho/Dr. Favian Sweeney, that she was referred to in today's office visit. She was advised to call and schedule appt with Dr. Favian Sweeney for evaluation. As for pain control she was advised to use OTC ibuprofen to control pain.

## 2019-10-29 NOTE — TELEPHONE ENCOUNTER
Pt had US of knee done today and the technician told pt everything is normal and pt wants to know what the next step because still having a lot of pain.    Pt stated that what was given when her back hurt messed up her stomach and wants to make sure she does not get that medication again    Call pt at [phone]

## 2019-10-29 NOTE — PROGRESS NOTES
HISTORY OF PRESENT ILLNESS  Dano Jacob is a 44 y.o. female. HPI   Pt presents with \"left knee pain\"  Pain started all of a sudden, last night  Sharp pain, that lasts a couple seconds and then goes away  When it is present, she is unable to bear weight on the knee  Pain is 5/6 out of 10  No known injury  It does appear a little swollen to the patient  No redness  Review of Systems   Constitutional: Negative for fever. HENT: Negative for congestion. Gastrointestinal: Negative for diarrhea and vomiting. Musculoskeletal: Positive for joint pain. Physical Exam   Constitutional: She is oriented to person, place, and time. She appears well-developed and well-nourished. HENT:   Head: Normocephalic and atraumatic. Cardiovascular: Normal rate, regular rhythm and normal heart sounds. Pulmonary/Chest: Effort normal and breath sounds normal.   Musculoskeletal:        Left knee: She exhibits swelling. She exhibits normal range of motion and no erythema. Legs:  Neurological: She is alert and oriented to person, place, and time. Skin: Skin is warm and dry. Psychiatric: She has a normal mood and affect. Her behavior is normal.       ASSESSMENT and PLAN    ICD-10-CM ICD-9-CM    1. Acute pain of left knee M25.562 719.46 DUPLEX LOWER EXT VENOUS LEFT     Educated about US, and will notify when this returns  Educated about always going to ER with continued and/or worsening of symptoms    Pt informed to return to office with worsening of symptoms, or PRN with any questions or concerns. Pt verbalizes understanding of plan of care and denies further questions or concerns at this time.

## 2019-10-29 NOTE — PATIENT INSTRUCTIONS
Knee Pain or Injury: Care Instructions  Your Care Instructions    Injuries are a common cause of knee problems. Sudden (acute) injuries may be caused by a direct blow to the knee. They can also be caused by abnormal twisting, bending, or falling on the knee. Pain, bruising, or swelling may be severe, and may start within minutes of the injury. Overuse is another cause of knee pain. Other causes are climbing stairs, kneeling, and other activities that use the knee. Everyday wear and tear, especially as you get older, also can cause knee pain. Rest, along with home treatment, often relieves pain and allows your knee to heal. If you have a serious knee injury, you may need tests and treatment. Follow-up care is a key part of your treatment and safety. Be sure to make and go to all appointments, and call your doctor if you are having problems. It's also a good idea to know your test results and keep a list of the medicines you take. How can you care for yourself at home? · Be safe with medicines. Read and follow all instructions on the label. ? If the doctor gave you a prescription medicine for pain, take it as prescribed. ? If you are not taking a prescription pain medicine, ask your doctor if you can take an over-the-counter medicine. · Rest and protect your knee. Take a break from any activity that may cause pain. · Put ice or a cold pack on your knee for 10 to 20 minutes at a time. Put a thin cloth between the ice and your skin. · Prop up a sore knee on a pillow when you ice it or anytime you sit or lie down for the next 3 days. Try to keep it above the level of your heart. This will help reduce swelling. · If your knee is not swollen, you can put moist heat, a heating pad, or a warm cloth on your knee. · If your doctor recommends an elastic bandage, sleeve, or other type of support for your knee, wear it as directed.   · Follow your doctor's instructions about how much weight you can put on your leg. Use a cane, crutches, or a walker as instructed. · Follow your doctor's instructions about activity during your healing process. If you can do mild exercise, slowly increase your activity. · Reach and stay at a healthy weight. Extra weight can strain the joints, especially the knees and hips, and make the pain worse. Losing even a few pounds may help. When should you call for help? Call 911 anytime you think you may need emergency care. For example, call if:    · You have symptoms of a blood clot in your lung (called a pulmonary embolism). These may include:  ? Sudden chest pain. ? Trouble breathing. ? Coughing up blood.    Call your doctor now or seek immediate medical care if:    · You have severe or increasing pain.     · Your leg or foot turns cold or changes color.     · You cannot stand or put weight on your knee.     · Your knee looks twisted or bent out of shape.     · You cannot move your knee.     · You have signs of infection, such as:  ? Increased pain, swelling, warmth, or redness. ? Red streaks leading from the knee. ? Pus draining from a place on your knee. ? A fever.     · You have signs of a blood clot in your leg (called a deep vein thrombosis), such as:  ? Pain in your calf, back of the knee, thigh, or groin. ? Redness and swelling in your leg or groin.    Watch closely for changes in your health, and be sure to contact your doctor if:    · You have tingling, weakness, or numbness in your knee.     · You have any new symptoms, such as swelling.     · You have bruises from a knee injury that last longer than 2 weeks.     · You do not get better as expected. Where can you learn more? Go to http://daisy-millicent.info/. Enter K195 in the search box to learn more about \"Knee Pain or Injury: Care Instructions. \"  Current as of: June 26, 2019  Content Version: 12.2  © 3250-0325 [employer]  Care instructions adapted under license by [employer] (which disclaims liability or warranty for this information). If you have questions about a medical condition or this instruction, always ask your healthcare professional. [address] any warranty or liability for your use of this information.

## 2019-10-29 NOTE — PROGRESS NOTES
Identified pt with two pt identifiers(name and ). Chief Complaint   Patient presents with    Knee Pain     left side - no known injury        Health Maintenance Due   Topic    Shingrix Vaccine Age 50> (1 of 2)    BREAST CANCER SCRN MAMMOGRAM     FOBT Q 1 YEAR AGE 50-75        Wt Readings from Last 3 Encounters:   10/29/19 221 lb (100.2 kg)   10/10/19 221 lb (100.2 kg)   19 218 lb (98.9 kg)     Temp Readings from Last 3 Encounters:   10/29/19 98.4 °F (36.9 °C) (Oral)   10/10/19 98 °F (36.7 °C) (Oral)   19 98.5 °F (36.9 °C) (Oral)     BP Readings from Last 3 Encounters:   10/29/19 131/72   10/10/19 126/82   19 101/64     Pulse Readings from Last 3 Encounters:   10/29/19 (!) 104   10/10/19 89   19 83         Learning Assessment:  :     Learning Assessment 2013   PRIMARY LEARNER Patient Patient Patient   HIGHEST LEVEL OF EDUCATION - PRIMARY LEARNER  - - > 4 YEARS OF COLLEGE   BARRIERS PRIMARY LEARNER - - NONE   CO-LEARNER CAREGIVER - - No   PRIMARY LANGUAGE ENGLISH ENGLISH ENGLISH    NEED - - No   LEARNER PREFERENCE PRIMARY DEMONSTRATION READING LISTENING     - LISTENING -   ANSWERED BY patient patient self   RELATIONSHIP SELF SELF SELF   ASSESSMENT COMMENT - - no       Depression Screening:  :     3 most recent PHQ Screens 2019   PHQ Not Done -   Little interest or pleasure in doing things Several days   Feeling down, depressed, irritable, or hopeless Several days   Total Score PHQ 2 2       Fall Risk Assessment:  :     Fall Risk Assessment, last 12 mths 2018   Able to walk? Yes   Fall in past 12 months? No       Abuse Screening:  :     Abuse Screening Questionnaire 2019 2018 2017 8/15/2016   Do you ever feel afraid of your partner? N N N N   Are you in a relationship with someone who physically or mentally threatens you? N N N N   Is it safe for you to go home?  Jerry Barker           Coordination of Care Questionnaire:  :     1) Have you been to an emergency room, urgent care clinic since your last visit? no   Hospitalized since your last visit? no             2) Have you seen or consulted any other health care providers outside of [address] [place] [address] since your last visit? no  (Include any pap smears or colon screenings in this section.)    3) Do you have an Advance Directive on file? no  Are you interested in receiving information about Advance Directives? no      Reviewed record in preparation for visit and have obtained necessary documentation. Medication reconciliation up to date and corrected with patient at this time.

## 2019-10-30 ENCOUNTER — TELEPHONE (OUTPATIENT)
Dept: FAMILY MEDICINE CLINIC | Age: 49
End: 2019-10-30

## 2019-10-30 NOTE — TELEPHONE ENCOUNTER
Patient is calling and stated that she has been taking ibuprofen and tylenol and it is not helping. She is asking if Rebecca Rivera has any other suggestions.   She has appt with ortho on 11/4. [phone]

## 2019-11-29 ENCOUNTER — TELEPHONE (OUTPATIENT)
Dept: NEUROLOGY | Age: 49
End: 2019-11-29

## 2019-11-29 DIAGNOSIS — G43.909 MIGRAINE WITHOUT STATUS MIGRAINOSUS, NOT INTRACTABLE, UNSPECIFIED MIGRAINE TYPE: ICD-10-CM

## 2019-12-02 RX ORDER — SUMATRIPTAN 100 MG/1
TABLET, FILM COATED ORAL
Qty: 9 TAB | Refills: 0 | Status: SHIPPED | OUTPATIENT
Start: 2019-12-02 | End: 2019-12-03 | Stop reason: SDUPTHER

## 2019-12-02 NOTE — TELEPHONE ENCOUNTER
Patient calling for Imitrex 100mg refill. She was last seen 7/27/2018 and is scheduled for a f/u 12/23/2019. OK per Dr. Kehinde Johnson to fill #[phone] with no refills.

## 2019-12-03 ENCOUNTER — OFFICE VISIT (OUTPATIENT)
Dept: NEUROLOGY | Age: 49
End: 2019-12-03

## 2019-12-03 ENCOUNTER — TELEPHONE (OUTPATIENT)
Dept: NEUROLOGY | Age: 49
End: 2019-12-03

## 2019-12-03 VITALS
BODY MASS INDEX: 35.52 KG/M2 | SYSTOLIC BLOOD PRESSURE: 124 MMHG | WEIGHT: 221 LBS | TEMPERATURE: 98.6 F | DIASTOLIC BLOOD PRESSURE: 70 MMHG | OXYGEN SATURATION: 98 % | RESPIRATION RATE: 16 BRPM | HEIGHT: 66 IN | HEART RATE: 77 BPM

## 2019-12-03 DIAGNOSIS — G43.909 MIGRAINE WITHOUT STATUS MIGRAINOSUS, NOT INTRACTABLE, UNSPECIFIED MIGRAINE TYPE: ICD-10-CM

## 2019-12-03 RX ORDER — SUMATRIPTAN 100 MG/1
TABLET, FILM COATED ORAL
Qty: 9 TAB | Refills: 2 | Status: SHIPPED | OUTPATIENT
Start: 2019-12-03 | End: 2020-03-13 | Stop reason: SDUPTHER

## 2019-12-03 RX ORDER — TOPIRAMATE 50 MG/1
150 TABLET, FILM COATED ORAL
Qty: 90 TAB | Refills: 5 | Status: SHIPPED | OUTPATIENT
Start: 2019-12-03 | End: 2021-01-04 | Stop reason: DRUGHIGH

## 2019-12-03 RX ORDER — PROMETHAZINE HYDROCHLORIDE 50 MG/1
TABLET ORAL
Qty: 30 TAB | Refills: 2 | Status: SHIPPED | OUTPATIENT
Start: 2019-12-03 | End: 2020-07-14 | Stop reason: SDUPTHER

## 2019-12-03 NOTE — PROGRESS NOTES
Neurology Progress Note    Patient ID:  Mateo Coughlin  [id number]  45 y.o.  1970      Subjective:   History:  Sanjeev Kelly is a female who  has a past medical history of Back pain; Borderline personality disorder; Dysthymic disorder; Headache; Insomnia, PTSD, PUD (peptic ulcer disease); and Schizoaffective disorder (HCC). who has severe headache, R periorbital radiating to R temple and frontal area, 3-4/ week, lasting 4 hrs, stress seem to be a trigger, Imitrex works, has nausea, photophobia and phonophobia. Consideration include migraine, without visual auras, intractable which may be related to perimenopause and stress due to recent passing of father from a pedestrian accident. Patient also noted cognitive issues for 5 months described as jumbling up words concerning for mild cognitive impairment probably due to her psych medication and again stress from her dad's passing. Propranolol caused SOB and diarrhea. Zofran did not help.      Patient is now on Topamax 50 mg 3 tabs QHS and was doing better with no headaches for 4 weeks, then get 5 days straight of headache. Imitrex still works. Stress is main trigger.     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  citalopram hydrobromide (CELEXA PO) Take 1 Tab by mouth once over twenty-four (24) hours. PT UNCERTAIN OF EXACT MG      diazePAM (VALIUM) 10 mg tablet three (3) times daily.  divalproex ER (DEPAKOTE ER) 500 mg ER tablet Take 500 mg by mouth two (2) times a day.  ziprasidone (GEODON) 80 mg capsule Take 80 mg by mouth once over twenty-four (24) hours.  trospium (SANCTURA) 20 mg tablet Take 20 mg by mouth two (2) times a day.  mirtazapine (REMERON) 30 mg tablet Take 30 mg by mouth nightly.  prazosin (MINIPRESS) 2 mg capsule Take 2 mg by mouth nightly.  benztropine (COGENTIN) 1 mg tablet Take 1 mg by mouth two (2) times a day.  cloZAPine (CLOZARIL) 100 mg tablet Take 200 mg by mouth nightly. 2 tabs @ hs      lithium carbonate 300 mg tablet Take 1 Tab by mouth daily. Indications: mood stabilization (Patient taking differently: Take 300 mg by mouth two (2) times a day. take one twice a day  Indications: mood stabilization) 7 Tab 0    diclofenac EC (VOLTAREN) 75 mg EC tablet Take 1 Tab by mouth two (2) times a day. 30 Tab 0    clonazePAM (KLONOPIN) 1 mg tablet Take 1 mg by mouth three (3) times daily. No current facility-administered medications on file prior to visit. Imaging:    CT Results (recent):  Results from Hospital Encounter encounter on 10/29/18   CTA CHEST W OR W WO CONT    Narrative Clinical indication: Chest pain and shortness of breath, acute. Localizer obtained without contrast at the level of the pulmonary arteries. Fast  injection rate of 100 cc of Isovue-370 with review of the raw data and MIP  reconstructions. No prior. CT dose reduction was achieved through the use of a  standardized protocol tailored for this examination and automatic exposure  control for dose modulation . [place] Small bilateral pleural effusions. No pulmonary emboli. No shift or  pneumothorax, no adenopathy or parenchymal mass.  Axilla appear unremarkable. Impression IMPRESSION:    No pulmonary emboli. MRI Results (recent):  No results found for this or any previous visit. IR Results (recent):  No results found for this or any previous visit. VAS/US Results (recent):  No results found for this or any previous visit. Reviewed records in [facility] tab today    Lab Review     No visits with results within 3 Month(s) from this visit. Latest known visit with results is:   Office Visit on 07/18/2019   Component Date Value Ref Range Status    TSH 07/18/2019 4.290  0.450 - 4.500 uIU/mL Final         Objective:       Exam:  Visit Vitals  /70 (BP 1 Location: Right arm, BP Patient Position: Sitting)   Pulse 77   Temp 98.6 °F (37 °C) (Oral)   Resp 16   Ht 5' 6\" (1.676 m)   Wt 100.2 kg (221 lb)   SpO2 98%   BMI 35.67 kg/m²     Gen: Awake, alert, follows commands  Appropriate appearance, normal speech. Oriented to all spheres. No visual field defect on confrontation exam.  Full eyes movement, with no nystagmus, no diplopia, no ptosis. Normal gag and swallow. All remaining cranial nerves were normal  Motor function: 5/5 in all extremities  Sensory: intact to LT, PP and JPS  Good FTN and HTS   Gait: Normal    Assessment:       ICD-10-CM ICD-9-CM    1. Migraine without status migrainosus, not intractable, unspecified migraine type G43.909 346.90 promethazine (PHENERGAN) 50 mg tablet      SUMAtriptan (IMITREX) 100 mg tablet      topiramate (TOPAMAX) 50 mg tablet       Plan:   1. Continue Topamax 50 mg 3 tabs QHS for migraine prevention  2. Continue Imitrex 100 mg prn to abort headaches. 3.Continue Phenergan 50 mg prn for nausea  4. Continue headache diary and went through the list that can trigger headache (stress)        Follow-up and Dispositions    · Return in about 1 year (around 12/3/2020).            Chelsie Prado MD  Diplomate, American Board of Psychiatry and Neurology  Diplomate, Neuromuscular Medicine  Diplomate, American Board of Electrodiagnostic Medicine

## 2019-12-03 NOTE — TELEPHONE ENCOUNTER
----- Message from Rachna Steiner sent at 12/3/2019 12:32 PM EST -----  Regarding: Dr. Clarisse Mclaughlin Message/Vendor Calls    Caller's first and last name:  Rachael Carlin     Reason for call: Pt is requesting a call back to ask about a few medications that never received the pharmacy. She is currently at the pharmacy.        Callback required yes/no and why: Yes       Best contact number(s):[phone]      Details to clarify the request: N/A

## 2019-12-03 NOTE — TELEPHONE ENCOUNTER
Called and spoke with patient to clarify what she needed. Patient states she went to [facility] and they had not received the Topamax or Phenergan. I explained to patient there was a known lag in the time we sign the rx to the time the pharmacy received it, but that I would call it in. Patient states understanding. I called [facility] and called in the Phenergan and Topamax. Pharmacist states when it does finally come through electronically--she will delete it.

## 2019-12-03 NOTE — PROGRESS NOTES
Chief Complaint   Patient presents with    Follow-up     Patient is here for a f/u on migraines. She states sometimes she goes 3 weeks without a migraine, then the next week she will have one for 3-4 days.      Visit Vitals  /70 (BP 1 Location: Right arm, BP Patient Position: Sitting)   Pulse 77   Temp 98.6 °F (37 °C) (Oral)   Resp 16   Ht 5' 6\" (1.676 m)   Wt 100.2 kg (221 lb)   SpO2 98%   BMI 35.67 kg/m²

## 2019-12-03 NOTE — TELEPHONE ENCOUNTER
----- Message from Gaynell Boxer sent at 12/3/2019 12:32 PM EST -----  Regarding: Dr. Shilpi Aguiar Message/Vendor Calls    Caller's first and last name:  Meg Olmos     Reason for call: Pt is requesting a call back to ask about a few medications that never received the pharmacy. She is currently at the pharmacy.        Callback required yes/no and why: Yes       Best contact number(s):[phone]      Details to clarify the request: N/A

## 2019-12-04 RX ORDER — TOPIRAMATE 100 MG/1
TABLET, FILM COATED ORAL
Qty: 30 TAB | Refills: 4 | Status: SHIPPED | OUTPATIENT
Start: 2019-12-04 | End: 2021-01-04 | Stop reason: DRUGHIGH

## 2020-01-02 ENCOUNTER — HOSPITAL ENCOUNTER (OUTPATIENT)
Dept: GENERAL RADIOLOGY | Age: 50
Discharge: HOME | End: 2020-01-02
Attending: NURSE PRACTITIONER
Payer: MEDICAID

## 2020-01-02 ENCOUNTER — OFFICE VISIT (OUTPATIENT)
Dept: FAMILY MEDICINE CLINIC | Age: 50
End: 2020-01-02

## 2020-01-02 VITALS
HEART RATE: 96 BPM | BODY MASS INDEX: 37.12 KG/M2 | HEIGHT: 66 IN | OXYGEN SATURATION: 98 % | DIASTOLIC BLOOD PRESSURE: 81 MMHG | RESPIRATION RATE: 16 BRPM | SYSTOLIC BLOOD PRESSURE: 117 MMHG | WEIGHT: 231 LBS | TEMPERATURE: 98 F

## 2020-01-02 DIAGNOSIS — M54.50 ACUTE BILATERAL LOW BACK PAIN WITHOUT SCIATICA: ICD-10-CM

## 2020-01-02 DIAGNOSIS — M54.50 ACUTE MIDLINE LOW BACK PAIN WITHOUT SCIATICA: Primary | ICD-10-CM

## 2020-01-02 DIAGNOSIS — M54.50 ACUTE BILATERAL LOW BACK PAIN WITHOUT SCIATICA: Primary | ICD-10-CM

## 2020-01-02 LAB
BILIRUB UR QL STRIP: NEGATIVE
GLUCOSE UR-MCNC: NEGATIVE MG/DL
KETONES P FAST UR STRIP-MCNC: NEGATIVE MG/DL
PH UR STRIP: 6.5 [PH] (ref 4.6–8)
PROT UR QL STRIP: NEGATIVE
SP GR UR STRIP: 1.01 (ref 1–1.03)
UA UROBILINOGEN AMB POC: NORMAL (ref 0.2–1)
URINALYSIS CLARITY POC: CLEAR
URINALYSIS COLOR POC: YELLOW
URINE BLOOD POC: NEGATIVE
URINE LEUKOCYTES POC: NEGATIVE
URINE NITRITES POC: NEGATIVE

## 2020-01-02 PROCEDURE — 72072 X-RAY EXAM THORAC SPINE 3VWS: CPT

## 2020-01-02 PROCEDURE — 72100 X-RAY EXAM L-S SPINE 2/3 VWS: CPT

## 2020-01-02 RX ORDER — LIDOCAINE 40 MG/G
CREAM TOPICAL
Qty: 15 G | Refills: 0 | Status: SHIPPED | OUTPATIENT
Start: 2020-01-02

## 2020-01-02 RX ORDER — SULINDAC 200 MG/1
TABLET ORAL
Refills: 2 | COMMUNITY
Start: 2019-11-01 | End: 2020-07-05

## 2020-01-02 RX ORDER — HYDROXYZINE 50 MG/1
TABLET, FILM COATED ORAL
COMMUNITY
End: 2020-07-05

## 2020-01-02 NOTE — PROGRESS NOTES
HISTORY OF PRESENT ILLNESS  Leia  is a 44 y.o. female. HPI   Pt presents with \"low back pain\"    Pt states that she has had pain for about 3 weeks. She states that she fell at home, and landed on her left elbow and left knee. She feels like this made her back pain worse. She has a hard time describing the pain, but would describe it as a \"Grasping or sharp\" pain. Pain is worse with standing or walking   Pain is in lower back, bilaterally  No numbness or tingling in hands or feet at this time, but a week and a half ago, she had numbness in her left hand. Review of Systems   Constitutional: Negative for fever. HENT: Negative for congestion. Gastrointestinal: Negative for diarrhea and vomiting. Musculoskeletal: Positive for back pain. Physical Exam  Constitutional:       Appearance: Normal appearance. HENT:      Head: Normocephalic and atraumatic. Cardiovascular:      Rate and Rhythm: Normal rate and regular rhythm. Heart sounds: Normal heart sounds. Pulmonary:      Effort: Pulmonary effort is normal.      Breath sounds: Normal breath sounds. Musculoskeletal:      Lumbar back: She exhibits tenderness. She exhibits normal range of motion. Neurological:      Mental Status: She is alert. Psychiatric:         Mood and Affect: Mood normal.         Behavior: Behavior normal.         ASSESSMENT and PLAN    ICD-10-CM ICD-9-CM    1. Acute bilateral low back pain without sciatica M54.5 724.2 AMB POC URINALYSIS DIP STICK MANUAL W/O MICRO     338.19 XR SPINE THORAC 3 V      XR SPINE LUMB 2 OR 3 V     Will notify when x-ray returns, and will inform her when this returns    Pt informed to return to office with worsening of symptoms, or PRN with any questions or concerns. Pt verbalizes understanding of plan of care and denies further questions or concerns at this time.

## 2020-01-02 NOTE — PATIENT INSTRUCTIONS
Back Pain: Care Instructions  Your Care Instructions    Back pain has many possible causes. It is often related to problems with muscles and ligaments of the back. It may also be related to problems with the nerves, discs, or bones of the back. Moving, lifting, standing, sitting, or sleeping in an awkward way can strain the back. Sometimes you don't notice the injury until later. Arthritis is another common cause of back pain. Although it may hurt a lot, back pain usually improves on its own within several weeks. Most people recover in 12 weeks or less. Using good home treatment and being careful not to stress your back can help you feel better sooner. Follow-up care is a key part of your treatment and safety. Be sure to make and go to all appointments, and call your doctor if you are having problems. It's also a good idea to know your test results and keep a list of the medicines you take. How can you care for yourself at home? · Sit or lie in positions that are most comfortable and reduce your pain. Try one of these positions when you lie down:  ? Lie on your back with your knees bent and supported by large pillows. ? Lie on the floor with your legs on the seat of a sofa or chair. ? Lie on your side with your knees and hips bent and a pillow between your legs. ? Lie on your stomach if it does not make pain worse. · Do not sit up in bed, and avoid soft couches and twisted positions. Bed rest can help relieve pain at first, but it delays healing. Avoid bed rest after the first day of back pain. · Change positions every 30 minutes. If you must sit for long periods of time, take breaks from sitting. Get up and walk around, or lie in a comfortable position. · Try using a heating pad on a low or medium setting for 15 to 20 minutes every 2 or 3 hours. Try a warm shower in place of one session with the heating pad. · You can also try an ice pack for 10 to 15 minutes every 2 to 3 hours.  Put a thin cloth between the ice pack and your skin. · Take pain medicines exactly as directed. ? If the doctor gave you a prescription medicine for pain, take it as prescribed. ? If you are not taking a prescription pain medicine, ask your doctor if you can take an over-the-counter medicine. · Take short walks several times a day. You can start with 5 to 10 minutes, 3 or 4 times a day, and work up to longer walks. Walk on level surfaces and avoid hills and stairs until your back is better. · Return to work and other activities as soon as you can. Continued rest without activity is usually not good for your back. · To prevent future back pain, do exercises to stretch and strengthen your back and stomach. Learn how to use good posture, safe lifting techniques, and proper body mechanics. When should you call for help? Call your doctor now or seek immediate medical care if:    · You have new or worsening numbness in your legs.     · You have new or worsening weakness in your legs. (This could make it hard to stand up.)     · You lose control of your bladder or bowels.    Watch closely for changes in your health, and be sure to contact your doctor if:    · You have a fever, lose weight, or don't feel well.     · You do not get better as expected. Where can you learn more? Go to http://daisy-millicent.info/. Enter I780 in the search box to learn more about \"Back Pain: Care Instructions. \"  Current as of: June 26, 2019  Content Version: 12.2  © 0718-0139 [employer]. Care instructions adapted under license by [employer] (which disclaims liability or warranty for this information). If you have questions about a medical condition or this instruction, always ask your healthcare professional. [place] [address] any warranty or liability for your use of this information.

## 2020-01-02 NOTE — PROGRESS NOTES
Please call patient and let her know that her x-rays returned and are stable. I have sent in lidocaine cream and she can use as prescribed.   If she would like PT, I am happy to order, just let me know  Thanks

## 2020-01-02 NOTE — PROGRESS NOTES
Identified pt with two pt identifiers(name and ). Chief Complaint   Patient presents with   [address] Maintenance Due   Topic    Shingrix Vaccine Age 50> (1 of 2)    BREAST CANCER SCRN MAMMOGRAM     FOBT Q 1 YEAR AGE 50-75        Wt Readings from Last 3 Encounters:   20 231 lb (104.8 kg)   19 221 lb (100.2 kg)   10/29/19 221 lb (100.2 kg)     Temp Readings from Last 3 Encounters:   20 98 °F (36.7 °C) (Oral)   19 98.6 °F (37 °C) (Oral)   10/29/19 98.4 °F (36.9 °C) (Oral)     BP Readings from Last 3 Encounters:   20 117/81   19 124/70   10/29/19 131/72     Pulse Readings from Last 3 Encounters:   20 96   19 77   10/29/19 (!) 104         Learning Assessment:  :     Learning Assessment 2013   PRIMARY LEARNER Patient Patient Patient   HIGHEST LEVEL OF EDUCATION - PRIMARY LEARNER  - - > 4 YEARS OF COLLEGE   BARRIERS PRIMARY LEARNER - - NONE   CO-LEARNER CAREGIVER - - No   PRIMARY LANGUAGE ENGLISH ENGLISH ENGLISH    NEED - - No   LEARNER PREFERENCE PRIMARY DEMONSTRATION READING LISTENING     - LISTENING -   ANSWERED BY patient patient self   RELATIONSHIP SELF SELF SELF   ASSESSMENT COMMENT - - no       Depression Screening:  :     3 most recent PHQ Screens 2019   PHQ Not Done -   Little interest or pleasure in doing things Several days   Feeling down, depressed, irritable, or hopeless Several days   Total Score PHQ 2 2       Fall Risk Assessment:  :     Fall Risk Assessment, last 12 mths 2018   Able to walk? Yes   Fall in past 12 months? No       Abuse Screening:  :     Abuse Screening Questionnaire 2019 2018 2017 8/15/2016   Do you ever feel afraid of your partner? N N N N   Are you in a relationship with someone who physically or mentally threatens you? N N N N   Is it safe for you to go home?  Arion Roots       Coordination of Care Questionnaire:  :     1) Have you been to an emergency room, urgent care clinic since your last visit? no   Hospitalized since your last visit? no             2) Have you seen or consulted any other health care providers outside of [address] [place] [address] since your last visit? no  (Include any pap smears or colon screenings in this section.)    3) Do you have an Advance Directive on file? no  Are you interested in receiving information about Advance Directives? no      Reviewed record in preparation for visit and have obtained necessary documentation. Medication reconciliation up to date and corrected with patient at this time. Order for POC Rapid  U/A Testing placed per [facility] verbal order.

## 2020-04-22 ENCOUNTER — TELEPHONE (OUTPATIENT)
Dept: NEUROLOGY | Age: 50
End: 2020-04-22

## 2020-04-22 NOTE — TELEPHONE ENCOUNTER
----- Message from Medina Lopez sent at 4/22/2020 12:48 PM EDT -----  Regarding: Dr Colt Veliz refill  Medication Refill    Caller (if not patient):      Relationship of caller (if not patient):      Best contact number(s):[phone]      Name of medication and dosage if known:Imitrex, does not remember the dosage      Is patient out of this medication (yes/no):yes      Pharmacy name:[facility]     Pharmacy listed in chart? (yes/no):yes  Pharmacy phone RRWYFE:[phone]      Details to clarify the request:      Medina Lopez

## 2020-04-22 NOTE — TELEPHONE ENCOUNTER
Called and spoke with patient. Advised her that her Imitrex was sent to [facility] 3/13/20 with a refill. Patient states she did not know there was a refill, so she will check with [facility] and let me know.

## 2020-07-05 ENCOUNTER — HOSPITAL ENCOUNTER (EMERGENCY)
Age: 50
Discharge: HOME | End: 2020-07-05
Attending: EMERGENCY MEDICINE
Payer: MEDICAID

## 2020-07-05 ENCOUNTER — APPOINTMENT (OUTPATIENT)
Dept: CT IMAGING | Age: 50
End: 2020-07-05
Attending: EMERGENCY MEDICINE
Payer: MEDICAID

## 2020-07-05 VITALS
SYSTOLIC BLOOD PRESSURE: 109 MMHG | WEIGHT: 230 LBS | DIASTOLIC BLOOD PRESSURE: 56 MMHG | BODY MASS INDEX: 36.96 KG/M2 | TEMPERATURE: 97.4 F | HEART RATE: 87 BPM | HEIGHT: 66 IN | OXYGEN SATURATION: 99 % | RESPIRATION RATE: 14 BRPM

## 2020-07-05 DIAGNOSIS — K59.00 CONSTIPATION, UNSPECIFIED CONSTIPATION TYPE: ICD-10-CM

## 2020-07-05 DIAGNOSIS — R10.84 GENERALIZED ABDOMINAL PAIN: Primary | ICD-10-CM

## 2020-07-05 LAB
ALBUMIN SERPL-MCNC: 3.6 G/DL (ref 3.5–5)
ALBUMIN/GLOB SERPL: 1.1 {RATIO} (ref 1.1–2.2)
ALP SERPL-CCNC: 102 U/L (ref 45–117)
ALT SERPL-CCNC: 27 U/L (ref 12–78)
ANION GAP SERPL CALC-SCNC: 7 MMOL/L (ref 5–15)
APPEARANCE UR: CLEAR
AST SERPL-CCNC: 10 U/L (ref 15–37)
BACTERIA URNS QL MICRO: NEGATIVE /HPF
BASOPHILS # BLD: 0 K/UL (ref 0–0.1)
BASOPHILS NFR BLD: 0 % (ref 0–1)
BILIRUB SERPL-MCNC: 0.3 MG/DL (ref 0.2–1)
BILIRUB UR QL: NEGATIVE
BUN SERPL-MCNC: 16 MG/DL (ref 6–20)
BUN/CREAT SERPL: 15 (ref 12–20)
CALCIUM SERPL-MCNC: 9.3 MG/DL (ref 8.5–10.1)
CHLORIDE SERPL-SCNC: 104 MMOL/L (ref 97–108)
CO2 SERPL-SCNC: 32 MMOL/L (ref 21–32)
COLOR UR: ABNORMAL
CREAT SERPL-MCNC: 1.07 MG/DL (ref 0.55–1.02)
DIFFERENTIAL METHOD BLD: ABNORMAL
EOSINOPHIL # BLD: 0 K/UL (ref 0–0.4)
EOSINOPHIL NFR BLD: 0 % (ref 0–7)
EPITH CASTS URNS QL MICRO: ABNORMAL /LPF
ERYTHROCYTE [DISTWIDTH] IN BLOOD BY AUTOMATED COUNT: 12.3 % (ref 11.5–14.5)
GLOBULIN SER CALC-MCNC: 3.2 G/DL (ref 2–4)
GLUCOSE SERPL-MCNC: 113 MG/DL (ref 65–100)
GLUCOSE UR STRIP.AUTO-MCNC: NEGATIVE MG/DL
HCG UR QL: NEGATIVE
HCT VFR BLD AUTO: 39.7 % (ref 35–47)
HGB BLD-MCNC: 12.6 G/DL (ref 11.5–16)
HGB UR QL STRIP: NEGATIVE
IMM GRANULOCYTES # BLD AUTO: 0.2 K/UL (ref 0–0.04)
IMM GRANULOCYTES NFR BLD AUTO: 2 % (ref 0–0.5)
KETONES UR QL STRIP.AUTO: NEGATIVE MG/DL
LEUKOCYTE ESTERASE UR QL STRIP.AUTO: NEGATIVE
LIPASE SERPL-CCNC: 154 U/L (ref 73–393)
LYMPHOCYTES # BLD: 2.6 K/UL (ref 0.8–3.5)
LYMPHOCYTES NFR BLD: 32 % (ref 12–49)
MCH RBC QN AUTO: 32.3 PG (ref 26–34)
MCHC RBC AUTO-ENTMCNC: 31.7 G/DL (ref 30–36.5)
MCV RBC AUTO: 101.8 FL (ref 80–99)
MONOCYTES # BLD: 0.6 K/UL (ref 0–1)
MONOCYTES NFR BLD: 8 % (ref 5–13)
NEUTS SEG # BLD: 4.5 K/UL (ref 1.8–8)
NEUTS SEG NFR BLD: 57 % (ref 32–75)
NITRITE UR QL STRIP.AUTO: NEGATIVE
NRBC # BLD: 0 K/UL (ref 0–0.01)
NRBC BLD-RTO: 0 PER 100 WBC
PH UR STRIP: 6.5 [PH] (ref 5–8)
PLATELET # BLD AUTO: 233 K/UL (ref 150–400)
PMV BLD AUTO: 9.9 FL (ref 8.9–12.9)
POTASSIUM SERPL-SCNC: 4.1 MMOL/L (ref 3.5–5.1)
PROT SERPL-MCNC: 6.8 G/DL (ref 6.4–8.2)
PROT UR STRIP-MCNC: NEGATIVE MG/DL
RBC # BLD AUTO: 3.9 M/UL (ref 3.8–5.2)
RBC #/AREA URNS HPF: ABNORMAL /HPF (ref 0–5)
SODIUM SERPL-SCNC: 143 MMOL/L (ref 136–145)
SP GR UR REFRACTOMETRY: 1.01 (ref 1–1.03)
UR CULT HOLD, URHOLD: NORMAL
UROBILINOGEN UR QL STRIP.AUTO: 0.2 EU/DL (ref 0.2–1)
WBC # BLD AUTO: 7.9 K/UL (ref 3.6–11)
WBC URNS QL MICRO: ABNORMAL /HPF (ref 0–4)

## 2020-07-05 PROCEDURE — 85025 COMPLETE CBC W/AUTO DIFF WBC: CPT

## 2020-07-05 PROCEDURE — 74011250636 HC RX REV CODE- 250/636: Performed by: EMERGENCY MEDICINE

## 2020-07-05 PROCEDURE — 36415 COLL VENOUS BLD VENIPUNCTURE: CPT

## 2020-07-05 PROCEDURE — 83690 ASSAY OF LIPASE: CPT

## 2020-07-05 PROCEDURE — 81001 URINALYSIS AUTO W/SCOPE: CPT

## 2020-07-05 PROCEDURE — 99285 EMERGENCY DEPT VISIT HI MDM: CPT

## 2020-07-05 PROCEDURE — 74176 CT ABD & PELVIS W/O CONTRAST: CPT

## 2020-07-05 PROCEDURE — 96374 THER/PROPH/DIAG INJ IV PUSH: CPT

## 2020-07-05 PROCEDURE — 80053 COMPREHEN METABOLIC PANEL: CPT

## 2020-07-05 PROCEDURE — 81025 URINE PREGNANCY TEST: CPT

## 2020-07-05 RX ORDER — LOPERAMIDE HYDROCHLORIDE 2 MG/1
CAPSULE ORAL
COMMUNITY

## 2020-07-05 RX ORDER — KETOROLAC TROMETHAMINE 30 MG/ML
30 INJECTION, SOLUTION INTRAMUSCULAR; INTRAVENOUS
Status: COMPLETED | OUTPATIENT
Start: 2020-07-05 | End: 2020-07-05

## 2020-07-05 RX ORDER — MAGNESIUM CITRATE
296 SOLUTION, ORAL ORAL
Qty: 296 ML | Refills: 0 | Status: SHIPPED | OUTPATIENT
Start: 2020-07-05 | End: 2020-07-05

## 2020-07-05 RX ADMIN — KETOROLAC TROMETHAMINE 30 MG: 30 INJECTION, SOLUTION INTRAMUSCULAR at 06:33

## 2020-07-05 NOTE — ED NOTES
Bedside and Verbal shift change report given to Stevie Parra (oncoming nurse) by Poonam Justin RN (offgoing nurse). Report included the following information SBAR, ED Summary, MAR and Recent Results.

## 2020-07-05 NOTE — ED TRIAGE NOTES
Patient brought in by EMS for complaint of \"severe abdominal pain for about a month. She is having a hard time having bowel movements. \"

## 2020-07-05 NOTE — DISCHARGE INSTRUCTIONS
Patient Education        Constipation: Care Instructions  Your Care Instructions     Constipation means that you have a hard time passing stools (bowel movements). People pass stools from 3 times a day to once every 3 days. What is normal for you may be different. Constipation may occur with pain in the rectum and cramping. The pain may get worse when you try to pass stools. Sometimes there are small amounts of bright red blood on toilet paper or the surface of stools. This is because of enlarged veins near the rectum (hemorrhoids). A few changes in your diet and lifestyle may help you avoid ongoing constipation. Your doctor may also prescribe medicine to help loosen your stool. Some medicines can cause constipation. These include pain medicines and antidepressants. Tell your doctor about all the medicines you take. Your doctor may want to make a medicine change to ease your symptoms. Follow-up care is a key part of your treatment and safety. Be sure to make and go to all appointments, and call your doctor if you are having problems. It's also a good idea to know your test results and keep a list of the medicines you take. How can you care for yourself at home? · Drink plenty of fluids, enough so that your urine is light yellow or clear like water. If you have kidney, heart, or liver disease and have to limit fluids, talk with your doctor before you increase the amount of fluids you drink. · Include high-fiber foods in your diet each day. These include fruits, vegetables, beans, and whole grains. · Get at least 30 minutes of exercise on most days of the week. Walking is a good choice. You also may want to do other activities, such as running, swimming, cycling, or playing tennis or team sports. · Take a fiber supplement, such as Citrucel or Metamucil, every day. Read and follow all instructions on the label. · Schedule time each day for a bowel movement. A daily routine may help.  Take your time having your bowel movement. · Support your feet with a small step stool when you sit on the toilet. This helps flex your hips and places your pelvis in a squatting position. · Your doctor may recommend an over-the-counter laxative to relieve your constipation. Examples are Milk of Magnesia and MiraLax. Read and follow all instructions on the label. Do not use laxatives on a long-term basis. When should you call for help? Call your doctor now or seek immediate medical care if:  · You have new or worse belly pain. · You have new or worse nausea or vomiting. · You have blood in your stools. Watch closely for changes in your health, and be sure to contact your doctor if:  · Your constipation is getting worse. · You do not get better as expected. Where can you learn more? Go to http://daisy-millicent.info/  Enter P343 in the search box to learn more about \"Constipation: Care Instructions. \"  Current as of: June 26, 2019               Content Version: 12.5  © 3778-6204 [employer]. Care instructions adapted under license by [employer] (which disclaims liability or warranty for this information). If you have questions about a medical condition or this instruction, always ask your healthcare professional. [place] [address] any warranty or liability for your use of this information. Patient Education        Abdominal Pain: Care Instructions  Your Care Instructions     Abdominal pain has many possible causes. Some aren't serious and get better on their own in a few days. Others need more testing and treatment. If your pain continues or gets worse, you need to be rechecked and may need more tests to find out what is wrong. You may need surgery to correct the problem. Don't ignore new symptoms, such as fever, nausea and vomiting, urination problems, pain that gets worse, and dizziness. These may be signs of a more serious problem.   Your doctor may have recommended a follow-up visit in the next 8 to 12 hours. If you are not getting better, you may need more tests or treatment. The doctor has checked you carefully, but problems can develop later. If you notice any problems or new symptoms, get medical treatment right away. Follow-up care is a key part of your treatment and safety. Be sure to make and go to all appointments, and call your doctor if you are having problems. It's also a good idea to know your test results and keep a list of the medicines you take. How can you care for yourself at home? · Rest until you feel better. · To prevent dehydration, drink plenty of fluids, enough so that your urine is light yellow or clear like water. Choose water and other caffeine-free clear liquids until you feel better. If you have kidney, heart, or liver disease and have to limit fluids, talk with your doctor before you increase the amount of fluids you drink. · If your stomach is upset, eat mild foods, such as rice, dry toast or crackers, bananas, and applesauce. Try eating several small meals instead of two or three large ones. · Wait until 48 hours after all symptoms have gone away before you have spicy foods, alcohol, and drinks that contain caffeine. · Do not eat foods that are high in fat. · Avoid anti-inflammatory medicines such as aspirin, ibuprofen (Advil, Motrin), and naproxen (Aleve). These can cause stomach upset. Talk to your doctor if you take daily aspirin for another health problem. When should you call for help? [id number] anytime you think you may need emergency care. For example, call if:  · You passed out (lost consciousness). · You pass maroon or very bloody stools. · You vomit blood or what looks like coffee grounds. · You have new, severe belly pain. Call your doctor now or seek immediate medical care if:  · Your pain gets worse, especially if it becomes focused in one area of your belly. · You have a new or higher fever.   · Your stools are black and look like tar, or they have streaks of blood. · You have unexpected vaginal bleeding. · You have symptoms of a urinary tract infection. These may include:  ? Pain when you urinate. ? Urinating more often than usual.  ? Blood in your urine. · You are dizzy or lightheaded, or you feel like you may faint. Watch closely for changes in your health, and be sure to contact your doctor if:  · You are not getting better after 1 day (24 hours). Where can you learn more? Go to http://www.gray.com/  Enter L618 in the search box to learn more about \"Abdominal Pain: Care Instructions. \"  Current as of: June 26, 2019               Content Version: 12.5  © 3686-2105 [employer]. Care instructions adapted under license by [employer] (which disclaims liability or warranty for this information). If you have questions about a medical condition or this instruction, always ask your healthcare professional. [address] any warranty or liability for your use of this information.

## 2020-07-05 NOTE — ED NOTES
Pt was discharged and given instructions by Dr Eddie Gonzales. Pt verbalized good understanding of all discharge instructions,paper prescription handed to pt and F/U care. All questions answered. Pt in stable condition on discharge.

## 2020-07-05 NOTE — ED PROVIDER NOTES
49-year-old female with a history of multiple psychiatric disorders, peptic ulcer disease presents with abdominal pain that is been ongoing for the past month. It worsened this morning. She rates her pain is severe. She localizes it to the upper abdomen and wraps around to the back. She denies any fevers or chills. She has had nausea but no vomiting. She denies any changes in her urination. She has had alternating episodes of constipation with diarrhea. She arrives via EMS.       Abdominal Pain           Past Medical History:   Diagnosis Date    Back pain     Borderline personality disorder ([address]     Dysthymic disorder     Headache     Insomnia, unspecified     Post traumatic stress disorder (PTSD)     Psychosis [facility]     PUD (peptic ulcer disease)     Schizoaffective disorder [facility] [address] [facility]        Past Surgical History:   Procedure Laterality Date    COLONOSCOPY N/A 11/8/2018    [facility] ENDOSCOPY    HX CHOLECYSTECTOMY  2012    HX GI  1971    Ulcer surgery of stomach         Family History:   Problem Relation Age of Onset    Hypertension Father     Psychiatric Disorder Father         ETOH    Coronary Artery Disease Father     Arthritis-osteo Mother     Thyroid Disease Mother     Stroke Maternal Grandmother     Malignant Hyperthermia Neg Hx     Pseudocholinesterase Deficiency Neg Hx     Delayed Awakening Neg Hx     Post-op Nausea/Vomiting Neg Hx     Emergence Delirium Neg Hx     Post-op Cognitive Dysfunction Neg Hx     Other Neg Hx        Social History     Socioeconomic History    Marital status: SINGLE     Spouse name: Not on file    Number of children: Not on file    Years of education: Not on file    Highest education level: Not on file   Occupational History    Not on file   Social Needs    Financial resource strain: Not on file    Food insecurity     Worry: Not on file     Inability: Not on file    Transportation needs     Medical: Not on file     Non-medical: Not on file   Tobacco Use    Smoking status: Current Every Day Smoker     Packs/day: 1.00     Years: 1.50     Pack years: 1.50     Types: Cigarettes    Smokeless tobacco: Never Used   Substance and Sexual Activity    Alcohol use: No    Drug use: Not Currently     Comment: 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    Sexual activity: Not Currently   Lifestyle    Physical activity     Days per week: Not on file     Minutes per session: Not on file    Stress: Not on file   Relationships    Social connections     Talks on phone: Not on file     Gets together: Not on file     Attends Nondenominational service: Not on file     Active member of club or organization: Not on file     Attends meetings of clubs or organizations: Not on file     Relationship status: Not on file    Intimate partner violence     Fear of current or ex partner: Not on file     Emotionally abused: Not on file     Physically abused: Not on file     Forced sexual activity: Not on file   Other Topics Concern    Not on file   Social History Narrative    Not on file         ALLERGIES: Penicillin g; Aspirin; and Prednisone    Review of Systems   Gastrointestinal: Positive for abdominal pain. All other systems reviewed and are negative. There were no vitals filed for this visit. Physical Exam  Vitals signs and nursing note reviewed. Constitutional:       General: She is not in acute distress. Appearance: She is obese. Comments: Unkempt appearance   HENT:      Head: Normocephalic and atraumatic. Mouth/Throat:      Mouth: Mucous membranes are moist.   Eyes:      General: No scleral icterus. Conjunctiva/sclera: Conjunctivae normal.      Pupils: Pupils are equal, round, and reactive to light. Neck:      Musculoskeletal: Neck supple. Trachea: No tracheal deviation. Cardiovascular:      Rate and Rhythm: Normal rate and regular rhythm.    Pulmonary:      Effort: Pulmonary effort is normal. No respiratory distress. Breath sounds: No wheezing or rales. Abdominal:      General: There is no distension. Palpations: Abdomen is soft. Tenderness: There is abdominal tenderness (Diffuse but greatest in the right lower quadrant, minimal rebound, no guarding). Genitourinary:     Comments: deferred  Musculoskeletal:         General: No deformity. Skin:     General: Skin is warm and dry. Neurological:      General: No focal deficit present. Mental Status: She is alert. Psychiatric:         Mood and Affect: Mood normal.          MDM   Differential diagnosis includes but not limited to appendicitis, diverticulitis, constipation    Procedures      Signed out to Dr. Evans Rosado pending CT scan.

## 2020-07-06 ENCOUNTER — PATIENT OUTREACH (OUTPATIENT)
Dept: INTERNAL MEDICINE CLINIC | Age: 50
End: 2020-07-06

## 2020-07-06 NOTE — PROGRESS NOTES
Patient contacted regarding recent discharge and COVID-19 risk. Did not discuss COVID-19 related testing which was not done at this time. Ambulatory Care Manager contacted the patient by telephone to perform post discharge assessment. Verified name and  with patient as identifiers. Patient has following risk factors of: ED visit 20. ACM reviewed discharge instructions, medical action plan and red flags related to discharge diagnosis. Reviewed and educated them on any new and changed medications related to discharge diagnosis. Patient denied questions or concerns regarding discharge instructions or medications. Patient verified healthcare decision makers as correctly listed in chart: Belkis Bennett (parent) [phone]    Patient reports she will call gastroenterologist office tomorrow to schedule a FU appointment    Education provided regarding infection prevention, and signs and symptoms of COVID-19 and when to seek medical attention with patient who verbalized understanding. Discussed exposure protocols and quarantine from [facility] you at higher risk for severe illness  and given an opportunity for questions and concerns. The patient was supplied the [employer]19 hotline [phone] and [facility] department hotline [phone]. ACM recommended patient follow up with PCP and provided AC contact information for future reference. From CDC: Are you at higher risk for severe illness?  Wash your hands often.  Avoid close contact (6 feet, which is about two arm lengths) with people who are sick.  Put distance between yourself and other people if COVID-19 is spreading in your community.  Clean and disinfect frequently touched surfaces.  Avoid all cruise travel and non-essential air travel.  Call your healthcare professional if you have concerns about COVID-19 and your underlying condition or if you are sick.     For more information on steps you can take to protect yourself, see CDC's How to Protect Yourself      Patient/family/caregiver given information for GetWell Loop and agrees to enroll yes  Patient's preferred e-mail:  [email or web address]  Patient's preferred phone number: [phone]  Based on Loop alert triggers, patient will be contacted by nurse care manager for worsening symptoms. Pt will be further monitored by COVID Loop Team based on severity of symptoms and risk factors.     Kelby Tejada RN  Ambulatory Care Manager

## 2020-07-14 ENCOUNTER — TELEPHONE (OUTPATIENT)
Dept: NEUROLOGY | Age: 50
End: 2020-07-14

## 2020-07-14 DIAGNOSIS — G43.909 MIGRAINE WITHOUT STATUS MIGRAINOSUS, NOT INTRACTABLE, UNSPECIFIED MIGRAINE TYPE: ICD-10-CM

## 2020-07-14 RX ORDER — PROMETHAZINE HYDROCHLORIDE 50 MG/1
TABLET ORAL
Qty: 30 TAB | Refills: 2 | Status: SHIPPED | OUTPATIENT
Start: 2020-07-14 | End: 2020-11-24 | Stop reason: SDUPTHER

## 2020-07-14 RX ORDER — SUMATRIPTAN 100 MG/1
TABLET, FILM COATED ORAL
Qty: 9 TAB | Refills: 4 | Status: SHIPPED | OUTPATIENT
Start: 2020-07-14 | End: 2020-11-24 | Stop reason: SDUPTHER

## 2020-07-14 NOTE — TELEPHONE ENCOUNTER
----- Message from [employer] sent at 7/13/2020  1:58 PM EDT -----  Regarding: Dr. Katelynn Resendez (if not patient):      Relationship of caller (if not patient):      Best contact number(s):     [phone]    Name of medication and dosage if known: Imitrex 100 mg  and Phenergan       Is patient out of this medication (yes/no):    2 left    [facility] listed in chart? (yes/no): yes  Pharmacy phone number: [phone]      Details to clarify the request:      [employer]

## 2020-11-24 ENCOUNTER — TRANSCRIBE ORDER (OUTPATIENT)
Dept: INTERNAL MEDICINE CLINIC | Age: 50
End: 2020-11-24

## 2020-11-24 ENCOUNTER — TELEPHONE (OUTPATIENT)
Dept: NEUROLOGY | Age: 50
End: 2020-11-24

## 2020-11-24 DIAGNOSIS — G43.909 MIGRAINE WITHOUT STATUS MIGRAINOSUS, NOT INTRACTABLE, UNSPECIFIED MIGRAINE TYPE: ICD-10-CM

## 2020-11-24 RX ORDER — SUMATRIPTAN 100 MG/1
TABLET, FILM COATED ORAL
Qty: 9 TAB | Refills: 0 | Status: SHIPPED | OUTPATIENT
Start: 2020-11-24 | End: 2021-01-04 | Stop reason: SDUPTHER

## 2020-11-24 RX ORDER — PROMETHAZINE HYDROCHLORIDE 50 MG/1
TABLET ORAL
Qty: 30 TAB | Refills: 0 | Status: SHIPPED | OUTPATIENT
Start: 2020-11-24 | End: 2021-01-04 | Stop reason: SDUPTHER

## 2020-11-24 NOTE — TELEPHONE ENCOUNTER
----- Message from Willie Zazueta Page sent at 11/24/2020 11:35 AM EST -----  Regarding: Dr. Lorrie Solomon Refill  Medication Refill    Caller (if not patient):n/a      Relationship of caller (if not patient): n/a      Best contact number(s): [phone]       Name of medication and dosage if known: Imitrex, Promethazine      Is patient out of this medication (yes/no): Yes      [facility] listed in chart? (yes/no): Yes  Pharmacy phone number: [phone]       Details to clarify the request:      Willie Burgos

## 2020-11-24 NOTE — TELEPHONE ENCOUNTER
Sent 1 month with 0 refills of Imitrex and Promethazine to [facility]. Follow up appt scheduled for next month.

## 2020-12-11 ENCOUNTER — PATIENT MESSAGE (OUTPATIENT)
Dept: INTERNAL MEDICINE CLINIC | Age: 50
End: 2020-12-11

## 2021-01-04 ENCOUNTER — OFFICE VISIT (OUTPATIENT)
Dept: NEUROLOGY | Age: 51
End: 2021-01-04
Payer: MEDICAID

## 2021-01-04 VITALS
BODY MASS INDEX: 36.96 KG/M2 | TEMPERATURE: 97.8 F | SYSTOLIC BLOOD PRESSURE: 140 MMHG | DIASTOLIC BLOOD PRESSURE: 80 MMHG | OXYGEN SATURATION: 99 % | HEART RATE: 80 BPM | HEIGHT: 66 IN | WEIGHT: 230 LBS | RESPIRATION RATE: 16 BRPM

## 2021-01-04 DIAGNOSIS — G43.909 MIGRAINE WITHOUT STATUS MIGRAINOSUS, NOT INTRACTABLE, UNSPECIFIED MIGRAINE TYPE: ICD-10-CM

## 2021-01-04 PROCEDURE — 99214 OFFICE O/P EST MOD 30 MIN: CPT | Performed by: PSYCHIATRY & NEUROLOGY

## 2021-01-04 RX ORDER — CITALOPRAM 20 MG/1
TABLET, FILM COATED ORAL
COMMUNITY
Start: 2020-12-21

## 2021-01-04 RX ORDER — SUMATRIPTAN 100 MG/1
TABLET, FILM COATED ORAL
Qty: 9 TAB | Refills: 3 | Status: SHIPPED | OUTPATIENT
Start: 2021-01-04 | End: 2021-04-20 | Stop reason: SDUPTHER

## 2021-01-04 RX ORDER — TOPIRAMATE 50 MG/1
150 TABLET, FILM COATED ORAL
Qty: 90 TAB | Refills: 5 | Status: SHIPPED | OUTPATIENT
Start: 2021-01-04 | End: 2022-01-24 | Stop reason: SDUPTHER

## 2021-01-04 RX ORDER — PROMETHAZINE HYDROCHLORIDE 50 MG/1
TABLET ORAL
Qty: 30 TAB | Refills: 3 | Status: SHIPPED | OUTPATIENT
Start: 2021-01-04 | End: 2021-05-17

## 2021-01-04 RX ORDER — CLONAZEPAM 1 MG/1
TABLET ORAL
COMMUNITY
Start: 2020-10-07

## 2021-01-04 NOTE — PROGRESS NOTES
Neurology Progress Note    Patient ID:  Lilliana Sol  [id number]  44 y.o.  1970      Subjective:   History:  Larry Horton a female who  has a past medical history of Back pain; Borderline personality disorder; Dysthymic disorder; Headache; Insomnia, PTSD, PUD (peptic ulcer disease); and Schizoaffective disorder (HCC). who has severe headache, R periorbital radiating to R temple and frontal area, 3-4/ week, lasting 4 hrs, stress seem to be a trigger, Imitrex works, has nausea, photophobia and phonophobia. Consideration include migraine, without visual auras, intractable which may be related to perimenopause and stress due to recent passing of father from a pedestrian accident. Patient also noted cognitive issues for 5 months described as jumbling up words concerning for mild cognitive impairment probably due to her psych medication and again stress from her dad's passing. Propranolol caused SOB and diarrhea. Zofran did not help.      6 months ago, patient ran out of Topamax and has been having 3 headaches/ week. Imitrex 100 mg and Phenergan still work. 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       divalproex ER (DEPAKOTE ER) 500 mg ER tablet Take 500 mg by mouth two (2) times a day.  ziprasidone (GEODON) 80 mg capsule Take 80 mg by mouth once over twenty-four (24) hours.  trospium (SANCTURA) 20 mg tablet Take 20 mg by mouth two (2) times a day.  mirtazapine (REMERON) 30 mg tablet Take 30 mg by mouth nightly.  prazosin (MINIPRESS) 2 mg capsule Take 2 mg by mouth nightly.  benztropine (COGENTIN) 1 mg tablet Take 1 mg by mouth two (2) times a day.  cloZAPine (CLOZARIL) 100 mg tablet Take 200 mg by mouth nightly. 2 tabs @ hs      lithium carbonate 300 mg tablet Take 1 Tab by mouth daily. Indications: mood stabilization (Patient taking differently: Take 300 mg by mouth two (2) times a day. take one twice a day  Indications: mood stabilization) 7 Tab 0    lidocaine (XYLOCAINE) 4 % topical cream Apply  to affected area three (3) times daily as needed for Pain. 15 g 0    DULoxetine (CYMBALTA) 60 mg capsule Take 60 mg by mouth once over twenty-four (24) hours.  diclofenac EC (VOLTAREN) 75 mg EC tablet Take 1 Tab by mouth two (2) times a day. 30 Tab 0    diazePAM (VALIUM) 10 mg tablet three (3) times daily. No current facility-administered medications on file prior to visit. Imaging:    CT Results (recent):  Results from Hospital Encounter encounter on 07/05/20   CT ABD PELV WO CONT    Narrative INDICATION: abdominal pain greatest in RLQ    COMPARISON: August 29, 2018    TECHNIQUE:   Noncontrast thin axial images were obtained through the abdomen and pelvis. Coronal and sagittal reconstructions were generated. CT dose reduction was  achieved through use of a standardized protocol tailored for this examination  and automatic exposure control for dose modulation. FINDINGS:   LUNG BASES: Trace bilateral pleural effusions. LIVER: No mass or biliary dilatation. GALLBLADDER: Surgically absent. SPLEEN: No enlargement or lesion.   PANCREAS: No mass or ductal dilatation. ADRENALS: No mass. KIDNEYS: No nephrolithiasis or hydronephrosis. GI TRACT:  No bowel obstruction. Large amount of stool in the colon. Subtle  haziness with small lymph nodes in the left mid abdominal mesentery. PERITONEUM: No free air or free fluid. APPENDIX: Unremarkable. RETROPERITONEUM: No aortic aneurysm. LYMPH NODES:  None enlarged. ADDITIONAL COMMENTS: N/A.    URINARY BLADDER: Unremarkable. REPRODUCTIVE ORGANS: Unremarkable. LYMPH NODES:  None enlarged. FREE FLUID:  None. BONES: No destructive bone lesion. ADDITIONAL COMMENTS: N/A. Impression IMPRESSION:    1. No nephrolithiasis or hydronephrosis. 2. Normal appendix. 3. Large amount of colonic stool without bowel obstruction. 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 4. Trace pleural effusions. MRI Results (recent):  No results found for this or any previous visit. IR Results (recent):  No results found for this or any previous visit. VAS/US Results (recent):  No results found for this or any previous visit. Reviewed records in [email or web address] and [facility] tab today    Lab Review     No visits with results within 3 Month(s) from this visit.    Latest known visit with results is:   Admission on 07/05/2020, Discharged on 07/05/2020   Component Date Value Ref Range Status    WBC 07/05/2020 7.9  3.6 - 11.0 K/uL Final    RBC 07/05/2020 3.90  3.80 - 5.20 M/uL Final    HGB 07/05/2020 12.6  11.5 - 16.0 g/dL Final    HCT 07/05/2020 39.7  35.0 - 47.0 % Final    MCV 07/05/2020 101.8* 80.0 - 99.0 FL Final    MCH 07/05/2020 32.3  26.0 - 34.0 PG Final    MCHC 07/05/2020 31.7  30.0 - 36.5 g/dL Final    RDW 07/05/2020 12.3  11.5 - 14.5 % Final    PLATELET 71/87/4510 140  150 - 400 K/uL Final    MPV 07/05/2020 9.9  8.9 - 12.9 FL Final    NRBC 07/05/2020 0.0  0.0  WBC Final    ABSOLUTE NRBC 07/05/2020 0.00  0.00 - 0.01 K/uL Final    NEUTROPHILS 07/05/2020 57  32 - 75 % Final    LYMPHOCYTES 07/05/2020 32  12 - 49 % Final    MONOCYTES 07/05/2020 8  5 - 13 % Final    EOSINOPHILS 07/05/2020 0  0 - 7 % Final    BASOPHILS 07/05/2020 0  0 - 1 % Final    IMMATURE GRANULOCYTES 07/05/2020 2* 0 - 0.5 % Final    ABS. NEUTROPHILS 07/05/2020 4.5  1.8 - 8.0 K/UL Final    ABS. LYMPHOCYTES 07/05/2020 2.6  0.8 - 3.5 K/UL Final    ABS. MONOCYTES 07/05/2020 0.6  0.0 - 1.0 K/UL Final    ABS. EOSINOPHILS 07/05/2020 0.0  0.0 - 0.4 K/UL Final    ABS. BASOPHILS 07/05/2020 0.0  0.0 - 0.1 K/UL Final    ABS. IMM. GRANS. 07/05/2020 0.2* 0.00 - 0.04 K/UL Final    DF 07/05/2020 AUTOMATED    Final    Sodium 07/05/2020 143  136 - 145 mmol/L Final    Potassium 07/05/2020 4.1  3.5 - 5.1 mmol/L Final    Chloride 07/05/2020 104  97 - 108 mmol/L Final    CO2 07/05/2020 32  21 - 32 mmol/L Final    Anion gap 07/05/2020 7  5 - 15 mmol/L Final    Glucose 07/05/2020 113* 65 - 100 mg/dL Final    BUN 07/05/2020 16  6 - 20 MG/DL Final    Creatinine 07/05/2020 1.07* 0.55 - 1.02 MG/DL Final    BUN/Creatinine ratio 07/05/2020 15  12 - 20   Final    GFR est AA 07/05/2020 >60  >60 ml/min/1.73m2 Final    GFR est non-AA 07/05/2020 54* >60 ml/min/1.73m2 Final    Comment: Estimated GFR is calculated using the IDMS-traceable Modification of Diet in Renal Disease (MDRD) Study equation, reported for both  Americans (GFRAA) and non- Americans (GFRNA), and normalized to 1.73m2 body surface area. The physician must decide which value applies to the patient. The MDRD study equation should only be used in individuals age 25 or older. It has not been validated for the following: pregnant women, patients with serious comorbid conditions, or on certain medications, or persons with extremes of body size, muscle mass, or nutritional status.       Calcium 07/05/2020 9.3  8.5 - 10.1 MG/DL Final    Bilirubin, total 07/05/2020 0.3  0.2 - 1.0 MG/DL Final    ALT (SGPT) 07/05/2020 27  12 - 78 U/L Final    AST (SGOT) 07/05/2020 10* 15 - 37 U/L Final    Alk. phosphatase 07/05/2020 102  45 - 117 U/L Final    Protein, total 07/05/2020 6.8  6.4 - 8.2 g/dL Final    Albumin 07/05/2020 3.6  3.5 - 5.0 g/dL Final    Globulin 07/05/2020 3.2  2.0 - 4.0 g/dL Final    A-G Ratio 07/05/2020 1.1  1.1 - 2.2   Final    Lipase 07/05/2020 154  73 - 393 U/L Final    Color 07/05/2020 YELLOW/STRAW    Final    Color Reference Range: Straw, Yellow or Dark Yellow    Appearance 07/05/2020 CLEAR  CLEAR   Final    Specific gravity 07/05/2020 1.015  1.003 - 1.030   Final    pH (UA) 07/05/2020 6.5  5.0 - 8.0   Final    Protein 07/05/2020 Negative  NEG mg/dL Final    Glucose 07/05/2020 Negative  NEG mg/dL Final    Ketone 07/05/2020 Negative  NEG mg/dL Final    Bilirubin 07/05/2020 Negative  NEG   Final    Blood 07/05/2020 Negative  NEG   Final    Urobilinogen 07/05/2020 0.2  0.2 - 1.0 EU/dL Final    Nitrites 07/05/2020 Negative  NEG   Final    Leukocyte Esterase 07/05/2020 Negative  NEG   Final    WBC 07/05/2020 0-4  0 - 4 /hpf Final    RBC 07/05/2020 0-5  0 - 5 /hpf Final    Epithelial cells 07/05/2020 MANY* FEW /lpf Final    Epithelial cell category consists of squamous cells and /or transitional urothelial cells. Renal tubular cells, if present, are separately identified as such.  Bacteria 07/05/2020 Negative  NEG /hpf Final    Urine culture hold 07/05/2020 Urine on hold in Microbiology dept for 2 days. If unpreserved urine is submitted, it cannot be used for addtional testing after 24 hours, recollection will be required.     Final    Pregnancy test,urine (POC) 07/05/2020 Negative  NEG   Final         Objective:       Exam:  Visit Vitals  BP (!) 140/80 (BP 1 Location: Right arm, BP Patient Position: Sitting)   Pulse 80   Temp 97.8 °F (36.6 °C) (Temporal)   Resp 16   Ht 5' 6\" (1.676 m)   Wt 104.3 kg (230 lb)   SpO2 99%   BMI 37.12 kg/m²     Gen: Awake, alert, follows commands  Motor function: 5/5 in all extremities  Gait: Normal    Assessment:       ICD-10-CM ICD-9-CM    1. Migraine without status migrainosus, not intractable, unspecified migraine type  G43.909 346.90 SUMAtriptan (IMITREX) 100 mg tablet      promethazine (PHENERGAN) 50 mg tablet           Plan:   1. Will put back on Topamax 50 mg 3 tabs QHS for migraine prevention  2. Continue Imitrex 100 mg prn to abort headaches. 3. Continue Phenergan 50 mg prn for nausea  4. Continue headache diary and went through the list that can trigger headache (stress)      Follow-up and Dispositions    · Return in about 6 months (around 7/4/2021).                Lyndsey Dsouza MD  Diplomate, American Board of Psychiatry and Neurology  Diplomate, Neuromuscular Medicine  Diplomate, American Board of Electrodiagnostic Medicine

## 2021-01-04 NOTE — PROGRESS NOTES
Chief Complaint   Patient presents with    Migraine     States doing well. Has 2 migraines a week.      Visit Vitals  BP (!) 140/80 (BP 1 Location: Right arm, BP Patient Position: Sitting)   Pulse 80   Temp 97.8 °F (36.6 °C) (Temporal)   Resp 16   Ht 5' 6\" (1.676 m)   Wt 104.3 kg (230 lb)   SpO2 99%   BMI 37.12 kg/m²

## 2021-01-04 NOTE — LETTER
1/4/2021 Patient: Aj Arora YOB: 1970 Date of Visit: 1/4/2021 Cristiana Higgins NP 
[address] [address] [facility] [phone] [address] [employer] Dear Cristiana Higgins NP, Thank you for referring Ms. Maribell Monge to [facility] for evaluation. My notes for this consultation are attached. If you have questions, please do not hesitate to call me. I look forward to following your patient along with you. Sincerely, Lakesha Jacome MD 
 
 Private Vehicle

## 2021-04-20 ENCOUNTER — TELEPHONE (OUTPATIENT)
Dept: NEUROLOGY | Age: 51
End: 2021-04-20

## 2021-04-20 DIAGNOSIS — G43.909 MIGRAINE WITHOUT STATUS MIGRAINOSUS, NOT INTRACTABLE, UNSPECIFIED MIGRAINE TYPE: ICD-10-CM

## 2021-04-20 RX ORDER — SUMATRIPTAN 100 MG/1
TABLET, FILM COATED ORAL
Qty: 9 TAB | Refills: 1 | Status: SHIPPED | OUTPATIENT
Start: 2021-04-20 | End: 2021-07-27 | Stop reason: SDUPTHER

## 2021-04-20 NOTE — TELEPHONE ENCOUNTER
Called and spoke to patient. I advised her that I would send in her Imitrex #9 for now because Dr. Red Mcburney is out of the office this afternoon. I stated I would call her back sometime tomorrow if Dr. Red Mcburney has another suggestion.

## 2021-04-20 NOTE — TELEPHONE ENCOUNTER
Patients has more migraines and more pain, it has not gotten better just worse. She only has 3 Imitrex left.  she's having kronic  back pain as well and she is not sure if it has to do with the migraines. Please call her to let her know what else she can do and if she can get refills on her Imitrex.  Call her at [phone]

## 2021-04-21 NOTE — TELEPHONE ENCOUNTER
Received: Today     Schedule follow-up appointment  Message Contents   Lc Fox MD  [facility], April M, LPN   Caller: Unspecified (Yesterday, 11:26 AM)             Ok to fill Imitrex. Schedule a follow up appointment (in person)       Called patient and left a detailed message with MDs advise.

## 2021-07-26 DIAGNOSIS — G43.909 MIGRAINE WITHOUT STATUS MIGRAINOSUS, NOT INTRACTABLE, UNSPECIFIED MIGRAINE TYPE: ICD-10-CM

## 2021-07-26 RX ORDER — PROMETHAZINE HYDROCHLORIDE 50 MG/1
TABLET ORAL
Qty: 30 TABLET | Refills: 0 | Status: SHIPPED | OUTPATIENT
Start: 2021-07-26 | End: 2021-09-23 | Stop reason: SDUPTHER

## 2021-07-28 RX ORDER — SUMATRIPTAN 100 MG/1
TABLET, FILM COATED ORAL
Qty: 9 TABLET | Refills: 1 | Status: SHIPPED | OUTPATIENT
Start: 2021-07-28 | End: 2021-09-23 | Stop reason: SDUPTHER

## 2021-11-11 DIAGNOSIS — G43.909 MIGRAINE WITHOUT STATUS MIGRAINOSUS, NOT INTRACTABLE, UNSPECIFIED MIGRAINE TYPE: ICD-10-CM

## 2021-11-11 RX ORDER — PROMETHAZINE HYDROCHLORIDE 50 MG/1
TABLET ORAL
Qty: 30 TABLET | Refills: 0 | Status: SHIPPED | OUTPATIENT
Start: 2021-11-11 | End: 2021-12-20 | Stop reason: SDUPTHER

## 2021-12-02 ENCOUNTER — TELEPHONE (OUTPATIENT)
Dept: FAMILY MEDICINE CLINIC | Age: 51
End: 2021-12-02

## 2021-12-02 NOTE — TELEPHONE ENCOUNTER
----- Message from Odell Grimm sent at 12/2/2021  3:44 PM EST -----  Subject: Message to Provider    QUESTIONS  Information for Provider? Pt has syatica and states that it's getting   worse. New Pt  ---------------------------------------------------------------------------  --------------  CALL BACK INFO  What is the best way for the office to contact you? OK to leave message on   voicemail  Preferred Call Back Phone Number? [phone]  ---------------------------------------------------------------------------  --------------  SCRIPT ANSWERS  Relationship to Patient? Self  (Is the patient requesting to be seen urgently for their symptoms?)? No  Is this follow up request related to routine Diabetes Management? No  Are you having any new concerns about your existing condition?  Yes

## 2021-12-20 ENCOUNTER — TELEPHONE (OUTPATIENT)
Dept: NEUROLOGY | Age: 51
End: 2021-12-20

## 2021-12-20 DIAGNOSIS — G43.909 MIGRAINE WITHOUT STATUS MIGRAINOSUS, NOT INTRACTABLE, UNSPECIFIED MIGRAINE TYPE: ICD-10-CM

## 2021-12-20 RX ORDER — PROMETHAZINE HYDROCHLORIDE 50 MG/1
50 TABLET ORAL
Qty: 30 TABLET | Refills: 0 | Status: SHIPPED | OUTPATIENT
Start: 2021-12-20 | End: 2022-01-24 | Stop reason: SDUPTHER

## 2021-12-20 RX ORDER — PROMETHAZINE HYDROCHLORIDE 50 MG/1
50 TABLET ORAL
Qty: 30 TABLET | Refills: 0 | Status: SHIPPED | OUTPATIENT
Start: 2021-12-20 | End: 2021-12-20 | Stop reason: SDUPTHER

## 2021-12-20 RX ORDER — SUMATRIPTAN 100 MG/1
TABLET, FILM COATED ORAL
Qty: 9 TABLET | Refills: 3 | Status: SHIPPED | OUTPATIENT
Start: 2021-12-20 | End: 2022-01-24 | Stop reason: SDUPTHER

## 2022-01-24 ENCOUNTER — OFFICE VISIT (OUTPATIENT)
Dept: NEUROLOGY | Age: 52
End: 2022-01-24
Payer: COMMERCIAL

## 2022-01-24 VITALS
DIASTOLIC BLOOD PRESSURE: 78 MMHG | BODY MASS INDEX: 38.57 KG/M2 | SYSTOLIC BLOOD PRESSURE: 128 MMHG | RESPIRATION RATE: 16 BRPM | WEIGHT: 240 LBS | OXYGEN SATURATION: 95 % | HEIGHT: 66 IN | HEART RATE: 100 BPM

## 2022-01-24 DIAGNOSIS — G43.909 MIGRAINE WITHOUT STATUS MIGRAINOSUS, NOT INTRACTABLE, UNSPECIFIED MIGRAINE TYPE: ICD-10-CM

## 2022-01-24 PROCEDURE — 99214 OFFICE O/P EST MOD 30 MIN: CPT | Performed by: PSYCHIATRY & NEUROLOGY

## 2022-01-24 RX ORDER — SUMATRIPTAN 100 MG/1
TABLET, FILM COATED ORAL
Qty: 9 TABLET | Refills: 3 | Status: SHIPPED | OUTPATIENT
Start: 2022-01-24 | End: 2022-07-25

## 2022-01-24 RX ORDER — TOPIRAMATE 50 MG/1
150 TABLET, FILM COATED ORAL
Qty: 90 TABLET | Refills: 5 | Status: SHIPPED | OUTPATIENT
Start: 2022-01-24

## 2022-01-24 RX ORDER — PROMETHAZINE HYDROCHLORIDE 50 MG/1
50 TABLET ORAL
Qty: 30 TABLET | Refills: 3 | Status: SHIPPED | OUTPATIENT
Start: 2022-01-24 | End: 2022-03-16 | Stop reason: SDUPTHER

## 2022-01-24 NOTE — PROGRESS NOTES
Chief Complaint   Patient presents with    Follow-up     patient states migraines are more frequent lately, atleast one a week      Visit Vitals  /78 (BP 1 Location: Left upper arm)   Pulse 100   Resp 16   Ht 5' 6\" (1.676 m)   Wt 108.9 kg (240 lb)   SpO2 95%   BMI 38.74 kg/m²

## 2022-01-24 NOTE — PROGRESS NOTES
Neurology Progress Note    Patient ID:  Siobhan Ascencio  [id number]  46 y.o.  1970      Subjective:   History:  Hamida Justin a female who  has a past medical history of Back pain; Borderline personality disorder; Dysthymic disorder; Headache; Insomnia, PTSD, PUD (peptic ulcer disease); and Schizoaffective disorder (HCC). who has severe headache, R periorbital radiating to R temple and frontal area, 3-4/ week, lasting 4 hrs, stress seem to be a trigger, Imitrex works, has nausea, photophobia and phonophobia. Consideration include migraine, without visual auras, intractable which may be related to perimenopause and stress due to recent passing of father from a pedestrian accident. Patient also noted cognitive issues for 5 months described as jumbling up words concerning for mild cognitive impairment probably due to her psych medication and again stress from her dad's passing. Propranolol caused SOB and diarrhea. Zofran did not help. Last seen Jan 2021.      Patient has been off Topamax for 8 months and currently getting 2-3/ week. Sumatriptan and Phenergan worked in the past.       ROS:  Per HPI-  Otherwise the remainder of ROS was negative    Social Hx  Social History     Socioeconomic History    Marital status: SINGLE   Tobacco Use    Smoking status: Former Smoker     Packs/day: 1.00     Years: 1.50     Pack years: 1.50     Types: Cigarettes    Smokeless tobacco: Never Used   Vaping Use    Vaping Use: Never used   Substance and Sexual Activity    Alcohol use: No    Drug use: Not Currently     Comment: 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    Sexual activity: Not Currently       Meds:  Current Outpatient Medications on File Prior to Visit   Medication Sig Dispense Refill    clonazePAM (KlonoPIN) 1 mg tablet       divalproex ER (DEPAKOTE ER) 500 mg ER tablet Take 500 mg by mouth two (2) times a day.       ziprasidone (GEODON) 80 mg capsule Take 80 mg by mouth once over twenty-four (24) hours.      trospium (SANCTURA) 20 mg tablet Take 20 mg by mouth two (2) times a day.  mirtazapine (REMERON) 30 mg tablet Take 30 mg by mouth nightly.  benztropine (COGENTIN) 1 mg tablet Take 1 mg by mouth two (2) times a day.  cloZAPine (CLOZARIL) 100 mg tablet Take 200 mg by mouth nightly. 2 tabs @ hs      citalopram (CELEXA) 20 mg tablet  (Patient not taking: Reported on 1/24/2022)      loperamide (IMODIUM) 2 mg capsule Take  by mouth. (Patient not taking: Reported on 1/24/2022)      lidocaine (XYLOCAINE) 4 % topical cream Apply  to affected area three (3) times daily as needed for Pain. 15 g 0    DULoxetine (CYMBALTA) 60 mg capsule Take 60 mg by mouth once over twenty-four (24) hours.  diclofenac EC (VOLTAREN) 75 mg EC tablet Take 1 Tab by mouth two (2) times a day. (Patient not taking: Reported on 1/24/2022) 30 Tab 0    diazePAM (VALIUM) 10 mg tablet three (3) times daily. (Patient not taking: Reported on 1/24/2022)      prazosin (MINIPRESS) 2 mg capsule Take 2 mg by mouth nightly. (Patient not taking: Reported on 1/24/2022)      lithium carbonate 300 mg tablet Take 1 Tab by mouth daily. Indications: mood stabilization (Patient taking differently: Take 300 mg by mouth two (2) times a day. take one twice a day  Indications: mood stabilization) 7 Tab 0     No current facility-administered medications on file prior to visit. Imaging:    CT Results (recent):  Results from Hospital Encounter encounter on 07/05/20    CT ABD PELV WO CONT    Narrative  INDICATION: abdominal pain greatest in RLQ    COMPARISON: August 29, 2018    TECHNIQUE:  Noncontrast thin axial images were obtained through the abdomen and pelvis. Coronal and sagittal reconstructions were generated. CT dose reduction was  achieved through use of a standardized protocol tailored for this examination  and automatic exposure control for dose modulation. FINDINGS:  LUNG BASES: Trace bilateral pleural effusions.   LIVER: No mass or biliary dilatation. GALLBLADDER: Surgically absent. SPLEEN: No enlargement or lesion. PANCREAS: No mass or ductal dilatation. ADRENALS: No mass. KIDNEYS: No nephrolithiasis or hydronephrosis. GI TRACT:  No bowel obstruction. Large amount of stool in the colon. Subtle  haziness with small lymph nodes in the left mid abdominal mesentery. PERITONEUM: No free air or free fluid. APPENDIX: Unremarkable. RETROPERITONEUM: No aortic aneurysm. LYMPH NODES:  None enlarged. ADDITIONAL COMMENTS: N/A.    URINARY BLADDER: Unremarkable. REPRODUCTIVE ORGANS: Unremarkable. LYMPH NODES:  None enlarged. FREE FLUID:  None. BONES: No destructive bone lesion. ADDITIONAL COMMENTS: N/A. Impression  IMPRESSION:    1. No nephrolithiasis or hydronephrosis. 2. Normal appendix. 3. Large amount of colonic stool without bowel obstruction. 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 4. Trace pleural effusions. MRI Results (recent):  No results found for this or any previous visit. IR Results (recent):  No results found for this or any previous visit. VAS/US Results (recent):  No results found for this or any previous visit. Reviewed records in [facility] tab today    Lab Review     No visits with results within 3 Month(s) from this visit.    Latest known visit with results is:   Admission on 07/05/2020, Discharged on 07/05/2020   Component Date Value Ref Range Status    WBC 07/05/2020 7.9  3.6 - 11.0 K/uL Final    RBC 07/05/2020 3.90  3.80 - 5.20 M/uL Final    HGB 07/05/2020 12.6  11.5 - 16.0 g/dL Final    HCT 07/05/2020 39.7  35.0 - 47.0 % Final    MCV 07/05/2020 101.8* 80.0 - 99.0 FL Final    MCH 07/05/2020 32.3  26.0 - 34.0 PG Final    MCHC 07/05/2020 31.7  30.0 - 36.5 g/dL Final    RDW 07/05/2020 12.3  11.5 - 14.5 % Final    PLATELET 29/05/2447 581  150 - 400 K/uL Final    MPV 07/05/2020 9.9  8.9 - 12.9 FL Final    NRBC 07/05/2020 0.0  0.0  WBC Final    ABSOLUTE NRBC 07/05/2020 0.00  0.00 - 0.01 K/uL Final    NEUTROPHILS 07/05/2020 57  32 - 75 % Final    LYMPHOCYTES 07/05/2020 32  12 - 49 % Final    MONOCYTES 07/05/2020 8  5 - 13 % Final    EOSINOPHILS 07/05/2020 0  0 - 7 % Final    BASOPHILS 07/05/2020 0  0 - 1 % Final    IMMATURE GRANULOCYTES 07/05/2020 2* 0 - 0.5 % Final    ABS. NEUTROPHILS 07/05/2020 4.5  1.8 - 8.0 K/UL Final    ABS. LYMPHOCYTES 07/05/2020 2.6  0.8 - 3.5 K/UL Final    ABS. MONOCYTES 07/05/2020 0.6  0.0 - 1.0 K/UL Final    ABS. EOSINOPHILS 07/05/2020 0.0  0.0 - 0.4 K/UL Final    ABS. BASOPHILS 07/05/2020 0.0  0.0 - 0.1 K/UL Final    ABS. IMM. GRANS. 07/05/2020 0.2* 0.00 - 0.04 K/UL Final    DF 07/05/2020 AUTOMATED    Final    Sodium 07/05/2020 143  136 - 145 mmol/L Final    Potassium 07/05/2020 4.1  3.5 - 5.1 mmol/L Final    Chloride 07/05/2020 104  97 - 108 mmol/L Final    CO2 07/05/2020 32  21 - 32 mmol/L Final    Anion gap 07/05/2020 7  5 - 15 mmol/L Final    Glucose 07/05/2020 113* 65 - 100 mg/dL Final    BUN 07/05/2020 16  6 - 20 MG/DL Final    Creatinine 07/05/2020 1.07* 0.55 - 1.02 MG/DL Final    BUN/Creatinine ratio 07/05/2020 15  12 - 20   Final    GFR est AA 07/05/2020 >60  >60 ml/min/1.73m2 Final    GFR est non-AA 07/05/2020 54* >60 ml/min/1.73m2 Final    Comment: Estimated GFR is calculated using the IDMS-traceable Modification of Diet in Renal Disease (MDRD) Study equation, reported for both  Americans (GFRAA) and non- Americans (GFRNA), and normalized to 1.73m2 body surface area. The physician must decide which value applies to the patient. The MDRD study equation should only be used in individuals age 25 or older.  It has not been validated for the following: pregnant women, patients with serious comorbid conditions, or on certain medications, or persons with extremes of body size, muscle mass, or nutritional status.  Calcium 07/05/2020 9.3  8.5 - 10.1 MG/DL Final    Bilirubin, total 07/05/2020 0.3  0.2 - 1.0 MG/DL Final    ALT (SGPT) 07/05/2020 27  12 - 78 U/L Final    AST (SGOT) 07/05/2020 10* 15 - 37 U/L Final    Alk. phosphatase 07/05/2020 102  45 - 117 U/L Final    Protein, total 07/05/2020 6.8  6.4 - 8.2 g/dL Final    Albumin 07/05/2020 3.6  3.5 - 5.0 g/dL Final    Globulin 07/05/2020 3.2  2.0 - 4.0 g/dL Final    A-G Ratio 07/05/2020 1.1  1.1 - 2.2   Final    Lipase 07/05/2020 154  73 - 393 U/L Final    Color 07/05/2020 YELLOW/STRAW    Final    Color Reference Range: Straw, Yellow or Dark Yellow    Appearance 07/05/2020 CLEAR  CLEAR   Final    Specific gravity 07/05/2020 1.015  1.003 - 1.030   Final    pH (UA) 07/05/2020 6.5  5.0 - 8.0   Final    Protein 07/05/2020 Negative  NEG mg/dL Final    Glucose 07/05/2020 Negative  NEG mg/dL Final    Ketone 07/05/2020 Negative  NEG mg/dL Final    Bilirubin 07/05/2020 Negative  NEG   Final    Blood 07/05/2020 Negative  NEG   Final    Urobilinogen 07/05/2020 0.2  0.2 - 1.0 EU/dL Final    Nitrites 07/05/2020 Negative  NEG   Final    Leukocyte Esterase 07/05/2020 Negative  NEG   Final    WBC 07/05/2020 0-4  0 - 4 /hpf Final    RBC 07/05/2020 0-5  0 - 5 /hpf Final    Epithelial cells 07/05/2020 MANY* FEW /lpf Final    Epithelial cell category consists of squamous cells and /or transitional urothelial cells. Renal tubular cells, if present, are separately identified as such.  Bacteria 07/05/2020 Negative  NEG /hpf Final    Urine culture hold 07/05/2020 Urine on hold in Microbiology dept for 2 days. If unpreserved urine is submitted, it cannot be used for addtional testing after 24 hours, recollection will be required.     Final    Pregnancy test,urine (POC) 07/05/2020 Negative  NEG   Final         Objective:       Exam:  Visit Vitals  /78 (BP 1 Location: Left upper arm)   Pulse 100   Resp 16   Ht 5' 6\" (1.676 m)   Wt 108.9 kg (240 lb)   SpO2 95%   BMI 38.74 kg/m²     Gen: Awake, alert, follows commands  Appropriate appearance, normal speech. Oriented to all spheres. No visual field defect on confrontation exam.  Full eyes movement, with no nystagmus, no diplopia, no ptosis. Normal gag and swallow. All remaining cranial nerves were normal  Motor function: 5/5 in all extremities  Sensory: intact to LT, PP and JPS  Good FTN and HTS   Gait: Normal    Assessment:       ICD-10-CM ICD-9-CM    1. Migraine without status migrainosus, not intractable, unspecified migraine type  G43.909 346.90 SUMAtriptan (IMITREX) 100 mg tablet      promethazine (PHENERGAN) 50 mg tablet           Plan:   1. Will put back on Topamax 50 mg 3 tabs QHS for migraine prevention  2. Continue Imitrex 100 mg prn to abort headaches. 3. Continue Phenergan 50 mg prn for nausea  4. Continue headache diary and went through the list that can trigger headache (stress)             Follow-up and Dispositions    · Return in about 6 months (around 7/24/2022).                Raza Beach MD  Diplomate, American Board of Psychiatry and Neurology  Diplomate, Neuromuscular Medicine  Diplomate, American Board of Electrodiagnostic Medicine

## 2022-03-10 NOTE — PATIENT INSTRUCTIONS
Foot Pain: Care Instructions  Your Care Instructions  Foot injuries that cause pain and swelling are fairly common. Almost all sports or home repair projects can cause a misstep that ends up as foot pain. Normal wear and tear, especially as you get older, also can cause foot pain. Most minor foot injuries will heal on their own, and home treatment is usually all you need to do. If you have a severe injury, you may need tests and treatment. Follow-up care is a key part of your treatment and safety. Be sure to make and go to all appointments, and call your doctor if you are having problems. It's also a good idea to know your test results and keep a list of the medicines you take. How can you care for yourself at home? · Take pain medicines exactly as directed. ¨ If the doctor gave you a prescription medicine for pain, take it as prescribed. ¨ If you are not taking a prescription pain medicine, ask your doctor if you can take an over-the-counter medicine. · Rest and protect your foot. Take a break from any activity that may cause pain. · Put ice or a cold pack on your foot for 10 to 20 minutes at a time. Put a thin cloth between the ice and your skin. · Prop up the sore foot on a pillow when you ice it or anytime you sit or lie down during the next 3 days. Try to keep it above the level of your heart. This will help reduce swelling. · Your doctor may recommend that you wrap your foot with an elastic bandage. Keep your foot wrapped for as long as your doctor advises. · If your doctor recommends crutches, use them as directed. · Wear roomy footwear. · As soon as pain and swelling end, begin gentle exercises of your foot. Your doctor can tell you which exercises will help. When should you call for help? Call 911 anytime you think you may need emergency care. For example, call if:  ? · Your foot turns pale, white, blue, or cold.    ?Call your doctor now or seek immediate medical care if:  ? · You cannot move or stand on your foot. ? · Your foot looks twisted or out of its normal position. ? · Your foot is not stable when you step down. ? · You have signs of infection, such as:  ¨ Increased pain, swelling, warmth, or redness. ¨ Red streaks leading from the sore area. ¨ Pus draining from a place on your foot. ¨ A fever. ? · Your foot is numb or tingly. ? Watch closely for changes in your health, and be sure to contact your doctor if:  ? · You do not get better as expected. ? · You have bruises from an injury that last longer than 2 weeks. Where can you learn more? Go to http://daisy-millicent.info/. Enter V433 in the search box to learn more about \"Foot Pain: Care Instructions. \"  Current as of: March 21, 2017  Content Version: 11.4  © 6789-0368 [employer]. Care instructions adapted under license by [employer] (which disclaims liability or warranty for this information). If you have questions about a medical condition or this instruction, always ask your healthcare professional. [address] any warranty or liability for your use of this information. Him/He

## 2022-03-16 DIAGNOSIS — G43.909 MIGRAINE WITHOUT STATUS MIGRAINOSUS, NOT INTRACTABLE, UNSPECIFIED MIGRAINE TYPE: ICD-10-CM

## 2022-03-16 NOTE — TELEPHONE ENCOUNTER
Requested Prescriptions     Pending Prescriptions Disp Refills    promethazine (PHENERGAN) 50 mg tablet 30 Tablet 3     Sig: Take 1 Tablet by mouth every eight (8) hours as needed for Nausea.  1 po q 6 to 8 hours prn vomit

## 2022-03-19 PROBLEM — G43.909 MIGRAINE WITHOUT STATUS MIGRAINOSUS, NOT INTRACTABLE: Status: ACTIVE | Noted: 2017-07-05

## 2022-03-21 RX ORDER — PROMETHAZINE HYDROCHLORIDE 50 MG/1
50 TABLET ORAL
Qty: 30 TABLET | Refills: 3 | Status: SHIPPED | OUTPATIENT
Start: 2022-03-21 | End: 2022-06-20 | Stop reason: SDUPTHER

## 2022-06-20 DIAGNOSIS — G43.909 MIGRAINE WITHOUT STATUS MIGRAINOSUS, NOT INTRACTABLE, UNSPECIFIED MIGRAINE TYPE: ICD-10-CM

## 2022-06-20 RX ORDER — PROMETHAZINE HYDROCHLORIDE 50 MG/1
50 TABLET ORAL
Qty: 30 TABLET | Refills: 3 | Status: SHIPPED | OUTPATIENT
Start: 2022-06-20 | End: 2022-09-08 | Stop reason: SDUPTHER

## 2022-06-20 NOTE — TELEPHONE ENCOUNTER
Patient requesting refill on :    Requested Prescriptions     Pending Prescriptions Disp Refills    promethazine (PHENERGAN) 50 mg tablet 30 Tablet 3     Sig: Take 1 Tablet by mouth every eight (8) hours as needed for Nausea.  1 po q 6 to 8 hours prn vomit

## 2022-09-08 DIAGNOSIS — G43.909 MIGRAINE WITHOUT STATUS MIGRAINOSUS, NOT INTRACTABLE, UNSPECIFIED MIGRAINE TYPE: ICD-10-CM

## 2022-09-08 RX ORDER — PROMETHAZINE HYDROCHLORIDE 50 MG/1
50 TABLET ORAL
Qty: 30 TABLET | Refills: 3 | Status: SHIPPED | OUTPATIENT
Start: 2022-09-08

## 2022-09-08 NOTE — TELEPHONE ENCOUNTER
Patient calling requesting a refill on:    Requested Prescriptions     Pending Prescriptions Disp Refills    promethazine (PHENERGAN) 50 mg tablet 30 Tablet 3     Sig: Take 1 Tablet by mouth every eight (8) hours as needed for Nausea.  1 po q 6 to 8 hours prn vomit

## 2022-10-04 ENCOUNTER — HOSPITAL ENCOUNTER (EMERGENCY)
Age: 52
Discharge: HOME | End: 2022-10-04
Attending: EMERGENCY MEDICINE
Payer: COMMERCIAL

## 2022-10-04 ENCOUNTER — APPOINTMENT (OUTPATIENT)
Dept: GENERAL RADIOLOGY | Age: 52
End: 2022-10-04
Attending: EMERGENCY MEDICINE
Payer: COMMERCIAL

## 2022-10-04 VITALS
HEART RATE: 95 BPM | HEIGHT: 66 IN | TEMPERATURE: 99.4 F | OXYGEN SATURATION: 97 % | BODY MASS INDEX: 40.18 KG/M2 | SYSTOLIC BLOOD PRESSURE: 134 MMHG | WEIGHT: 250 LBS | RESPIRATION RATE: 16 BRPM | DIASTOLIC BLOOD PRESSURE: 65 MMHG

## 2022-10-04 DIAGNOSIS — S93.601A SPRAIN OF RIGHT FOOT, INITIAL ENCOUNTER: ICD-10-CM

## 2022-10-04 DIAGNOSIS — S93.401A SPRAIN OF RIGHT ANKLE, UNSPECIFIED LIGAMENT, INITIAL ENCOUNTER: Primary | ICD-10-CM

## 2022-10-04 PROCEDURE — 73630 X-RAY EXAM OF FOOT: CPT

## 2022-10-04 PROCEDURE — 74011250637 HC RX REV CODE- 250/637: Performed by: EMERGENCY MEDICINE

## 2022-10-04 PROCEDURE — 99283 EMERGENCY DEPT VISIT LOW MDM: CPT

## 2022-10-04 RX ORDER — HYDROCODONE BITARTRATE AND ACETAMINOPHEN 5; 325 MG/1; MG/1
1 TABLET ORAL
Status: COMPLETED | OUTPATIENT
Start: 2022-10-04 | End: 2022-10-04

## 2022-10-04 RX ADMIN — HYDROCODONE BITARTRATE AND ACETAMINOPHEN 1 TABLET: 5; 325 TABLET ORAL at 20:08

## 2022-10-04 NOTE — ED PROVIDER NOTES
Date of Service:  10/4/2022    Patient:  Rachael Ceja    Chief Complaint:  Foot Injury (right)       HPI:  Rachael Ceja is a 45 y.o.  female who presents for evaluation of right foot pain. Patient states over a week ago she started having pain in her right foot. She denies any type of trauma to the pain. She states that pain is 8 out of 10, worse when she walks on it and when she is laying in bed after she gets off of it she describes a throbbing type discomfort. Patient [facility] but then was called and  Told she did not have a fracture. Patient continues to have discomfort today.        Past Medical History:   Diagnosis Date    Back pain     Borderline personality disorder ([address]     Dysthymic disorder     Headache     Insomnia, unspecified     Post traumatic stress disorder (PTSD)     Psychosis ([address]     PUD (peptic ulcer disease)     Schizoaffective disorder ([address]        Past Surgical History:   Procedure Laterality Date    COLONOSCOPY N/A 11/8/2018    COLONOSCOPY performed by Richard Salgado MD at [facility] ENDOSCOPY    HX CHOLECYSTECTOMY  2012    HX GI  1971    Ulcer surgery of stomach         Family History:   Problem Relation Age of Onset    Hypertension Father     Psychiatric Disorder Father         ETOH    Coronary Art Dis Father     OSTEOARTHRITIS Mother     Thyroid Disease Mother     Stroke Maternal Grandmother     Malignant Hyperthermia Neg Hx     Pseudocholinesterase Deficiency Neg Hx     Delayed Awakening Neg Hx     Post-op Nausea/Vomiting Neg Hx     Emergence Delirium Neg Hx     Post-op Cognitive Dysfunction Neg Hx     Other Neg Hx        Social History     Socioeconomic History    Marital status: SINGLE     Spouse name: Not on file    Number of children: Not on file    Years of education: Not on file    Highest education level: Not on file   Occupational History    Not on file   Tobacco Use    Smoking status: Former     Packs/day: 1.00 Years: 1.50     Pack years: 1.50     Types: Cigarettes    Smokeless tobacco: Never   Vaping Use    Vaping Use: Never used   Substance and Sexual Activity    Alcohol use: No    Drug use: Not Currently     Comment: 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    Sexual activity: Not Currently   Other Topics Concern    Not on file   Social History Narrative    Not on file     Social Determinants of Health     Financial Resource Strain: Not on file   Food Insecurity: Not on file   Transportation Needs: Not on file   Physical Activity: Not on file   Stress: Not on file   Social Connections: Not on file   Intimate Partner Violence: Not on file   Housing Stability: Not on file         ALLERGIES: Penicillin g, Aspirin, and Prednisone    Review of Systems   All other systems reviewed and are negative. Vitals:    10/04/22 1747   BP: 134/65   Pulse: 95   Resp: 16   Temp: 99.4 °F (37.4 °C)   SpO2: 97%   Weight: 113.4 kg (250 lb)   Height: 5' 6\" (1.676 m)            Physical Exam  Vitals and nursing note reviewed. Constitutional:       Appearance: Normal appearance. HENT:      Head: Normocephalic and atraumatic. Nose: Nose normal.      Mouth/Throat:      Mouth: Mucous membranes are moist.   Cardiovascular:      Rate and Rhythm: Normal rate. Pulmonary:      Effort: Pulmonary effort is normal.   Musculoskeletal:         General: Swelling and tenderness present. No deformity or signs of injury. Normal range of motion. Skin:     General: Skin is warm. Findings: Bruising (Bruising to the dorsum of the distal portion of the right foot and around the lateral ankle) present. Neurological:      Mental Status: She is alert. Psychiatric:         Mood and Affect: Mood normal.        MDM     VITAL SIGNS:  No data found. LABS:  No results found for this or any previous visit (from the past 6 hour(s)). IMAGING:  XR FOOT RT MIN 3 V   Final Result   1.  Degenerative change at the 1st metatarsophalangeal joint.   2. Soft tissue swelling over the forefoot. 3. Achilles heel spur            Medications During Visit:  Medications   HYDROcodone-acetaminophen (NORCO) 5-325 mg per tablet 1 Tablet (1 Tablet Oral Given 10/4/22 2008)         DECISION MAKING:  Meir Swanson is a 45 y.o. female who comes in as above. Well-appearing patient. .  Swelling to the foot and ankle. No definitive fracture. Patient already in a walking boot. Most likely a sprain. Patient to follow-up as an outpatient with providers as below. IMPRESSION:  1. Sprain of right ankle, unspecified ligament, initial encounter    2. Sprain of right foot, initial encounter        DISPOSITION:  Discharged      Discharge Medication List as of 10/4/2022  7:48 PM           Follow-up Information       Follow up With Specialties Details Why Contact Info    Your PCP  Schedule an appointment as soon as possible for a visit       Zayra Boogie DPM Orthopedic Surgery Schedule an appointment as soon as possible for a visit   [address] [place] [address]  [phone]                The patient is asked to follow-up with their primary care provider in the next several days. They are to call tomorrow for an appointment. The patient is asked to return promptly for any increased concerns or worsening of symptoms. They can return to this emergency department or any other emergency department.       Procedures

## 2022-10-04 NOTE — ED TRIAGE NOTES
Unknown injury to right foot seen Thursday at [facility] xrayed was told fractured placed in a walking boot. Radha Pallas was called and told not fractured. Still having pain and swelling.

## 2022-10-05 NOTE — ED NOTES
The patient was discharged home by [facility]  in stable condition. The patient is alert and oriented, in no respiratory distress. The patient's diagnosis, condition and treatment were explained. The patient expressed understanding. No prescriptions given/e-scribed to pharmacy. No work/school note given. A discharge plan has been developed. A  was not involved in the process. Aftercare instructions were given. Transportation home was arranged. Pt discharged from the ED via w/c by Yulissa Rader RN.

## 2022-11-14 DIAGNOSIS — G43.909 MIGRAINE WITHOUT STATUS MIGRAINOSUS, NOT INTRACTABLE, UNSPECIFIED MIGRAINE TYPE: ICD-10-CM

## 2022-11-14 NOTE — TELEPHONE ENCOUNTER
Patient calling requesting for more medications. The name of the medication is (Promethazine 50 mg tablet)    Please call to discuss.

## 2022-11-16 RX ORDER — PROMETHAZINE HYDROCHLORIDE 50 MG/1
50 TABLET ORAL
Qty: 30 TABLET | Refills: 3 | Status: SHIPPED | OUTPATIENT
Start: 2022-11-16

## 2022-11-16 NOTE — DISCHARGE INSTRUCTIONS
November 28, 2022      [facility]tero  [address] [place] [id number]        Dear ,    We are writing to inform you of your test results.    FIT test came back negative for colon cancer.    Resulted Orders   PSA, screen   Result Value Ref Range    Prostate Specific Antigen Screen 0.20 0.00 - 6.50 ng/mL    Narrative    This result is obtained using the Roche Elecsys total PSA method on the melanie e601 immunoassay analyzer. Results obtained with different assay methods or kits cannot be used interchangeably.   Lipid panel   Result Value Ref Range    Cholesterol 138 <200 mg/dL    Triglycerides 85 <150 mg/dL    Direct Measure HDL 51 >=40 mg/dL    LDL Cholesterol Calculated 70 <=100 mg/dL    Non HDL Cholesterol 87 <130 mg/dL    Narrative    Cholesterol  Desirable:  <200 mg/dL    Triglycerides  Normal:  Less than 150 mg/dL  Borderline High:  150-199 mg/dL  High:  200-499 mg/dL  Very High:  Greater than or equal to 500 mg/dL    Direct Measure HDL  Female:  Greater than or equal to 50 mg/dL   Male:  Greater than or equal to 40 mg/dL    LDL Cholesterol  Desirable:  <100mg/dL  Above Desirable:  100-129 mg/dL   Borderline High:  130-159 mg/dL   High:  160-189 mg/dL   Very High:  >= 190 mg/dL    Non HDL Cholesterol  Desirable:  130 mg/dL  Above Desirable:  130-159 mg/dL  Borderline High:  160-189 mg/dL  High:  190-219 mg/dL  Very High:  Greater than or equal to 220 mg/dL   Glucose   Result Value Ref Range    Glucose 98 70 - 99 mg/dL    Patient Fasting > 8hrs? Yes    Fecal colorectal cancer screen (FIT)   Result Value Ref Range    Occult Blood Screen FIT Negative Negative       If you have any questions or concerns, please call the clinic at the number listed above.       Sincerely,      Daniel Gallagher MD           Stop robaxin  Start flexeril  Can change ibuprofen to 600 mg 4 times daily as needed  Add lidocaine patch as well      Patient Education   Patient Education        Learning About Relief for Back Pain  What is back tension and strain? Back strain happens when you overstretch, or pull, a muscle in your back. You may hurt your back in an accident or when you exercise or lift something. Most back pain will get better with rest and time. You can take care of yourself at home to help your back heal.  What can you do first to relieve back pain? When you first feel back pain, try these steps:  · Walk. Take a short walk (10 to 20 minutes) on a level surface (no slopes, hills, or stairs) every 2 to 3 hours. Walk only distances you can manage without pain, especially leg pain. · Relax. Find a comfortable position for rest. Some people are comfortable on the floor or a medium-firm bed with a small pillow under their head and another under their knees. Some people prefer to lie on their side with a pillow between their knees. Don't stay in one position for too long. · Try heat or ice. Try using a heating pad on a low or medium setting, or take a warm shower, for 15 to 20 minutes every 2 to 3 hours. Or you can buy single-use heat wraps that last up to 8 hours. You can also try an ice pack for 10 to 15 minutes every 2 to 3 hours. You can use an ice pack or a bag of frozen vegetables wrapped in a thin towel. There is not strong evidence that either heat or ice will help, but you can try them to see if they help. You may also want to try switching between heat and cold. · Take pain medicine exactly as directed. ? If the doctor gave you a prescription medicine for pain, take it as prescribed. ? If you are not taking a prescription pain medicine, ask your doctor if you can take an over-the-counter medicine. What else can you do? · Stretch and exercise.  Exercises that increase flexibility may relieve your pain and make it easier for your muscles to keep your spine in a good, neutral position. And don't forget to keep walking. · Do self-massage. You can use self-massage to unwind after work or school or to energize yourself in the morning. You can easily massage your feet, hands, or neck. Self-massage works best if you are in comfortable clothes and are sitting or lying in a comfortable position. Use oil or lotion to massage bare skin. · Reduce stress. Back pain can lead to a vicious [place]: Distress about the pain tenses the muscles in your back, which in turn causes more pain. Learn how to relax your mind and your muscles to lower your stress. Where can you learn more? Go to http://daisy-millicent.info/. Enter U995 in the search box to learn more about \"Learning About Relief for Back Pain. \"  Current as of: September 20, 2018  Content Version: 11.9  © 9028-4867 [employer]. Care instructions adapted under license by [employer] (which disclaims liability or warranty for this information). If you have questions about a medical condition or this instruction, always ask your healthcare professional. [place] [address] any warranty or liability for your use of this information. Acute Low Back Pain: Exercises  Your Care Instructions  Here are some examples of typical rehabilitation exercises for your condition. Start each exercise slowly. Ease off the exercise if you start to have pain. Your doctor or physical therapist will tell you when you can start these exercises and which ones will work best for you. When you are not being active, find a comfortable position for rest. Some people are comfortable on the floor or a medium-firm bed with a small pillow under their head and another under their knees. Some people prefer to lie on their side with a pillow between their knees. Don't stay in one position for too long.   Take short walks (10 to 20 minutes) every 2 to 3 hours. Avoid slopes, hills, and stairs until you feel better. Walk only distances you can manage without pain, especially leg pain. How to do the exercises  Back stretches    1. Get down on your hands and knees on the floor. 2. Relax your head and allow it to droop. Round your back up toward the ceiling until you feel a nice stretch in your upper, middle, and lower back. Hold this stretch for as long as it feels comfortable, or about 15 to 30 seconds. 3. Return to the starting position with a flat back while you are on your hands and knees. 4. Let your back sway by pressing your stomach toward the floor. Lift your buttocks toward the ceiling. 5. Hold this position for 15 to 30 seconds. 6. Repeat 2 to 4 times. Follow-up care is a key part of your treatment and safety. Be sure to make and go to all appointments, and call your doctor if you are having problems. It's also a good idea to know your test results and keep a list of the medicines you take. Where can you learn more? Go to http://daisy-millicent.info/. Enter V688 in the search box to learn more about \"Acute Low Back Pain: Exercises. \"  Current as of: September 20, 2018  Content Version: 11.9  © 5056-2304 [employer]. Care instructions adapted under license by [employer] (which disclaims liability or warranty for this information). If you have questions about a medical condition or this instruction, always ask your healthcare professional. [address] any warranty or liability for your use of this information.

## 2022-12-28 DIAGNOSIS — G43.909 MIGRAINE WITHOUT STATUS MIGRAINOSUS, NOT INTRACTABLE, UNSPECIFIED MIGRAINE TYPE: ICD-10-CM

## 2022-12-28 RX ORDER — PROMETHAZINE HYDROCHLORIDE 50 MG/1
50 TABLET ORAL
Qty: 30 TABLET | Refills: 3 | Status: SHIPPED | OUTPATIENT
Start: 2022-12-28

## 2022-12-28 NOTE — TELEPHONE ENCOUNTER
Requested Prescriptions     Pending Prescriptions Disp Refills    promethazine (PHENERGAN) 50 mg tablet 30 Tablet 3     Sig: Take 1 Tablet by mouth every eight (8) hours as needed for Nausea. 1 po q 6 to 8 hours prn vomit     Pt states she is unable to find medication and is requesting a refill.  Please contact

## 2023-01-06 ENCOUNTER — OFFICE VISIT (OUTPATIENT)
Dept: FAMILY MEDICINE CLINIC | Age: 53
End: 2023-01-06
Payer: COMMERCIAL

## 2023-01-06 ENCOUNTER — APPOINTMENT (OUTPATIENT)
Dept: FAMILY MEDICINE CLINIC | Age: 53
End: 2023-01-06

## 2023-01-06 VITALS
HEART RATE: 78 BPM | BODY MASS INDEX: 41.42 KG/M2 | SYSTOLIC BLOOD PRESSURE: 110 MMHG | OXYGEN SATURATION: 97 % | HEIGHT: 66 IN | TEMPERATURE: 97.4 F | DIASTOLIC BLOOD PRESSURE: 70 MMHG | WEIGHT: 257.7 LBS | RESPIRATION RATE: 16 BRPM

## 2023-01-06 DIAGNOSIS — Z01.818 PRE-OPERATIVE CLEARANCE: Primary | ICD-10-CM

## 2023-01-06 DIAGNOSIS — Z01.818 PRE-OPERATIVE CLEARANCE: ICD-10-CM

## 2023-01-06 PROCEDURE — 93000 ELECTROCARDIOGRAM COMPLETE: CPT | Performed by: FAMILY MEDICINE

## 2023-01-06 PROCEDURE — 99203 OFFICE O/P NEW LOW 30 MIN: CPT | Performed by: FAMILY MEDICINE

## 2023-01-06 NOTE — PROGRESS NOTES
Identified pt with two pt identifiers(name and ). Chief Complaint   Patient presents with    Pre-op Exam     Patient is having surgery on right foot with Dr. Carol Godoy.         Health Maintenance Due   Topic    COVID-19 Vaccine (1)    Depression Monitoring     Cervical cancer screen     Shingles Vaccine (1 of 2)    Breast Cancer Screen Mammogram     Lipid Screen     Flu Vaccine (1)       Wt Readings from Last 3 Encounters:   23 257 lb 11.2 oz (116.9 kg)   10/04/22 250 lb (113.4 kg)   22 240 lb (108.9 kg)     Temp Readings from Last 3 Encounters:   23 97.4 °F (36.3 °C) (Temporal)   10/04/22 99.4 °F (37.4 °C)   21 97.8 °F (36.6 °C) (Temporal)     BP Readings from Last 3 Encounters:   23 110/70   10/04/22 134/65   22 128/78     Pulse Readings from Last 3 Encounters:   23 78   10/04/22 95   22 100         Learning Assessment:  :     Learning Assessment 2013   PRIMARY LEARNER Patient Patient Patient   HIGHEST LEVEL OF EDUCATION - PRIMARY LEARNER  - - > 4 YEARS OF COLLEGE   BARRIERS PRIMARY LEARNER - - NONE   CO-LEARNER CAREGIVER - - No   PRIMARY LANGUAGE ENGLISH ENGLISH ENGLISH    NEED - - No   LEARNER PREFERENCE PRIMARY DEMONSTRATION READING LISTENING     - LISTENING -   ANSWERED BY patient patient self   RELATIONSHIP SELF SELF SELF   ASSESSMENT COMMENT - - no       Depression Screening:  :     3 most recent PHQ Screens 2023   PHQ Not Done -   Little interest or pleasure in doing things Not at all   Feeling down, depressed, irritable, or hopeless Not at all   Total Score PHQ 2 0       Fall Risk Assessment:  :     Fall Risk Assessment, last 12 mths 2018   Able to walk? Yes   Fall in past 12 months? No       Abuse Screening:  :     Abuse Screening Questionnaire 2023 2019 2018 2017 8/15/2016   Do you ever feel afraid of your partner?  N N N N N   Are you in a relationship with someone who physically or mentally threatens you? N N N N N   Is it safe for you to go home? Y Y Y Y N       Coordination of Care Questionnaire:  :     1) Have you been to an emergency room, urgent care clinic since your last visit? no   Hospitalized since your last visit? no             2) Have you seen or consulted any other health care providers outside of [address] [place] [address] since your last visit? no  Dr Sinai De La Garza psychiatrist (Include any pap smears or colon screenings in this section.)    3) Do you have an Advance Directive on file? no  Are you interested in receiving information about Advance Directives? no    Patient is accompanied by self I have received verbal consent from Tamanna Sánchez to discuss any/all medical information while they are present in the room. 4.  For patients aged 39-70: Has the patient had a colonoscopy / FIT/ Cologuard? Yes - no Care Gap present      If the patient is female:    5. For patients aged 41-77: Has the patient had a mammogram within the past 2 years? No      6. For patients aged 21-65: Has the patient had a pap smear?  No

## 2023-01-07 LAB
ANION GAP SERPL CALC-SCNC: 5 MMOL/L (ref 5–15)
BUN SERPL-MCNC: 22 MG/DL (ref 6–20)
BUN/CREAT SERPL: 20 (ref 12–20)
CALCIUM SERPL-MCNC: 9.1 MG/DL (ref 8.5–10.1)
CHLORIDE SERPL-SCNC: 105 MMOL/L (ref 97–108)
CO2 SERPL-SCNC: 26 MMOL/L (ref 21–32)
CREAT SERPL-MCNC: 1.08 MG/DL (ref 0.55–1.02)
GLUCOSE SERPL-MCNC: 106 MG/DL (ref 65–100)
INR PPP: 1 (ref 0.9–1.1)
POTASSIUM SERPL-SCNC: 4.9 MMOL/L (ref 3.5–5.1)
PROTHROMBIN TIME: 10.6 SEC (ref 9–11.1)
SODIUM SERPL-SCNC: 136 MMOL/L (ref 136–145)

## 2023-01-09 ENCOUNTER — HOSPITAL ENCOUNTER (OUTPATIENT)
Dept: PREADMISSION TESTING | Age: 53
Discharge: HOME | End: 2023-01-09

## 2023-01-09 VITALS
BODY MASS INDEX: 40.77 KG/M2 | SYSTOLIC BLOOD PRESSURE: 124 MMHG | WEIGHT: 253.7 LBS | DIASTOLIC BLOOD PRESSURE: 58 MMHG | RESPIRATION RATE: 18 BRPM | TEMPERATURE: 97.5 F | OXYGEN SATURATION: 99 % | HEART RATE: 80 BPM | HEIGHT: 66 IN

## 2023-01-09 RX ORDER — HYDROXYZINE 50 MG/1
50 TABLET, FILM COATED ORAL
COMMUNITY

## 2023-01-09 RX ORDER — ASPIRIN 325 MG
650 TABLET ORAL AS NEEDED
COMMUNITY

## 2023-01-09 RX ORDER — MIRTAZAPINE 45 MG/1
22.5 TABLET, FILM COATED ORAL
COMMUNITY

## 2023-01-09 NOTE — PERIOP NOTES
[address] [facility], [address] [facility]   MAIN OR                                  [phone]   MAIN PRE OP                          [phone]                                                                                AMBULATORY PRE OP          [phone]  PRE-ADMISSION TESTING    [phone]   Surgery Date:  Friday 1/20/23       Is surgery arrival time given by surgeon? YES 6:00 AM  If NO, [facility] staff will call you between 3 and 7pm the day before your surgery with your arrival time. Call [phone] after 7pm Monday-Friday if you did not receive this call. INSTRUCTIONS BEFORE YOUR SURGERY   When You  Arrive Arrive at the [address] [facility] on the day of your surgery  Have your insurance card, photo ID, and any copayment (if needed)   Food   and   Drink NO food or drink after midnight the night before surgery    This means NO water, gum, mints, coffee, juice, etc.  No alcohol (beer, wine, liquor) 24 hours before and after surgery   Medications to   TAKE   Morning of Surgery MEDICATIONS TO TAKE THE MORNING OF SURGERY WITH A SIP OF WATER:   Depakote  Benztropine  Duloxetine  Clonazepam   Medications  To  STOP      7 days before surgery Non-Steroidal anti-inflammatory Drugs (NSAID's): for example, Ibuprofen (Advil, Motrin), Naproxen (Aleve)  Aspirin, if taking for pain   Herbal supplements, vitamins, and fish oil  Other:  (Pain medications not listed above, including Tylenol may be taken)   Blood  Thinners N/A   Bathing Clothing  Jewelry  Valuables     If you shower the morning of surgery, please do not apply anything to your skin (lotions, powders, deodorant, or makeup, especially mascara)  Follow Chlorhexidine Care Fusion body wash instructions provided to you during PAT appointment. Begin 3 days prior to surgery.   Do not shave or trim anywhere 24 hours before surgery  Wear your hair loose or down; no pony-tails, buns, or metal hair clips  Wear loose, comfortable, clean clothes  Wear glasses instead of contacts  Leave money, valuables, and jewelry, including body piercings, at home. Going Home - or Spending the Night SAME-DAY SURGERY: You must have a responsible adult drive you home and stay with you 24 hours after surgery     Special Instructions Bring completed PTA medication list with you on the day of your surgery. Free  parking 7AM-5PM.        Follow all instructions so your surgery wont be cancelled. Please, be on time. If a situation occurs and you are delayed the day of surgery, call [phone] . If your physical condition changes (like a fever, cold, flu, etc.) call your surgeon. Home medication(s) reviewed and verified via LIST   VERBAL   during PAT appointment. The patient was contacted by  IN-PERSON  The patient verbalizes understanding of all instructions and   DOES NOT   need reinforcement.

## 2023-01-09 NOTE — PROGRESS NOTES
Preoperative Evaluation    Date of Exam: 2023    Brandon Walsh is a 45 y.o. female (:1970) who presents for preoperative evaluation. Procedure/Surgery:TBD  Date of Procedure/Surgery: TBD  Surgeon: Dr. Arjun Swanson: Marla 86  Primary Physician: Eric Pham MD  Latex Allergy: no    Problem List:     Patient Active Problem List    Diagnosis Date Noted    Severe obesity (BMI 35.0-39.9) 2018    Migraine without status migrainosus, not intractable 2017    Lower abdominal pain 08/10/2016    Gastroesophageal reflux disease without esophagitis 08/10/2016    Suicidal overdose ([address] 2015    Schizoaffective disorder ([facility] 2015    Cough 2015    Leukocytosis 2015    Acute respiratory failure (HCC) 2015    Normocytic anemia 2015    Toxic encephalopathy 2015    Overdose of muscle relaxant 03/15/2015    Suicidal intent 03/15/2015    Airway compromise 03/15/2015    PTSD (post-traumatic stress disorder) 2015    Borderline personality disorder     PUD (peptic ulcer disease)      Medical History:     Past Medical History:   Diagnosis Date    Back pain     Borderline personality disorder ([address]     Dysthymic disorder     Headache     Insomnia, unspecified     Post traumatic stress disorder (PTSD)     Psychosis ([address]     PUD (peptic ulcer disease)     Schizoaffective disorder ([address]      Allergies: Allergies   Allergen Reactions    Penicillin G Anaphylaxis    Aspirin Nausea and Vomiting     Pt states she isn't allergic to ASA she just can't take  due to gastric ulcers. Prednisone Other (comments)     Pt does not like the way it makes her feel. Medications:     Current Outpatient Medications   Medication Sig    promethazine (PHENERGAN) 50 mg tablet Take 1 Tablet by mouth every eight (8) hours as needed for Nausea.  1 po q 6 to 8 hours prn vomit    SUMAtriptan (IMITREX) 100 mg tablet take 1 tablet by mouth at the onset of headache. may repeat 1 tablet after 2 hours. max 2 tabs daily    clonazePAM (KlonoPIN) 1 mg tablet     DULoxetine (CYMBALTA) 60 mg capsule Take 60 mg by mouth once over twenty-four (24) hours. divalproex ER (DEPAKOTE ER) 500 mg ER tablet Take 500 mg by mouth two (2) times a day. mirtazapine (REMERON) 30 mg tablet Take 30 mg by mouth nightly. benztropine (COGENTIN) 1 mg tablet Take 1 mg by mouth two (2) times a day. cloZAPine (CLOZARIL) 100 mg tablet Take 200 mg by mouth nightly. 2 tabs @ hs    loperamide (IMODIUM) 2 mg capsule Take  by mouth. (Patient not taking: No sig reported)    lidocaine (XYLOCAINE) 4 % topical cream Apply  to affected area three (3) times daily as needed for Pain. (Patient not taking: Reported on 1/6/2023)    diclofenac EC (VOLTAREN) 75 mg EC tablet Take 1 Tab by mouth two (2) times a day. (Patient not taking: No sig reported)    lithium carbonate 300 mg tablet Take 1 Tab by mouth daily. Indications: mood stabilization (Patient taking differently: Take 300 mg by mouth two (2) times a day. take one twice a day  Indications: mood stabilization)     No current facility-administered medications for this visit.      Surgical History:     Past Surgical History:   Procedure Laterality Date    COLONOSCOPY N/A 11/8/2018    COLONOSCOPY performed by Flip Martinez MD at [facility] ENDOSCOPY    HX CHOLECYSTECTOMY  2012    HX GI  1971    Ulcer surgery of stomach     Social History:     Social History     Socioeconomic History    Marital status: SINGLE   Tobacco Use    Smoking status: Former     Packs/day: 1.00     Years: 1.50     Pack years: 1.50     Types: Cigarettes     Quit date: 01/2013     Years since quitting: 10.0    Smokeless tobacco: Never   Vaping Use    Vaping Use: Never used   Substance and Sexual Activity    Alcohol use: No    Drug use: Not Currently     Comment: 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    Sexual activity: Not Currently     Social Determinants of Health     Financial Resource Strain: Low Risk     Difficulty of Paying Living Expenses: Not hard at all   Food Insecurity: No Food Insecurity    Worried About Running Out of Food in the Last Year: Never true    Ran Out of Food in the Last Year: Never true       Recent use of: No recent use of aspirin (ASA), NSAIDS or steroids    Tetanus up to date: last tetanus booster within 10 years      Anesthesia Complications: None  History of abnormal bleeding : None  History of Blood Transfusions: no  Health Care Directive or Living Will: no    REVIEW OF SYSTEMS:  Respiratory: positive for dyspnea on exertion or apnea  Cardiovascular: positive for dyspnea, fatigue, orthopnea  Gastrointestinal: negative    EXAM:   Visit Vitals  /70 (BP 1 Location: Right upper arm, BP Patient Position: Sitting, BP Cuff Size: Adult)   Pulse 78   Temp 97.4 °F (36.3 °C) (Temporal)   Resp 16   Ht 5' 6\" (1.676 m)   Wt 257 lb 11.2 oz (116.9 kg)   SpO2 97%   BMI 41.59 kg/m²     General appearance - alert, well appearing, and in no distress, oriented to person, place, and time, overweight, and well hydrated  Mental status - alert, oriented to person, place, and time, normal mood, behavior, speech, dress, motor activity, and thought processes, drowsy  Eyes - pupils equal and reactive, extraocular eye movements intact  Ears - bilateral TM's and external ear canals normal  Nose - normal and patent, no erythema, discharge or polyps  Mouth - mucous membranes moist, pharynx normal without lesions  Neck - supple, no significant adenopathy  Lymphatics - no palpable lymphadenopathy, no hepatosplenomegaly  Chest - clear to auscultation, no wheezes, rales or rhonchi, symmetric air entry  Heart - normal rate, regular rhythm, normal S1, S2, no murmurs, rubs, clicks or gallops  Abdomen - soft, nontender, nondistended, no masses or organomegaly  Back exam - full range of motion, no tenderness, palpable spasm or pain on motion  Neurological - alert, oriented, normal speech, no focal findings or movement disorder noted  Musculoskeletal - no joint tenderness, deformity or swelling  Extremities - peripheral pulses normal, no pedal edema, no clubbing or cyanosis  Skin - normal coloration and turgor, no rashes, no suspicious skin lesions noted      DIAGNOSTICS:   1. EKG: EKG FINDINGS - unchanged from previous tracings  2. CXR: not indicated  3.  Labs:   Lab Results   Component Value Date/Time    WBC 7.9 07/05/2020 06:25 AM    HGB 12.6 07/05/2020 06:25 AM    HCT 39.7 07/05/2020 06:25 AM    Hematocrit (POC) 38 03/12/2018 12:26 PM    PLATELET 038 93/93/5850 06:25 AM    .8 (H) 07/05/2020 06:25 AM     Lab Results   Component Value Date/Time    Hemoglobin A1c 5.1 06/24/2016 09:33 AM    Glucose 106 (H) 01/06/2023 09:30 AM    Glucose 95 02/11/2015 06:10 AM    Glucose (POC) 98 03/12/2018 12:26 PM    LDL, calculated 94 06/24/2016 09:33 AM    Creatinine (POC) 1.1 03/12/2018 12:26 PM    Creatinine 1.08 (H) 01/06/2023 09:30 AM      Lab Results   Component Value Date/Time    Cholesterol, total 166 06/24/2016 09:33 AM    HDL Cholesterol 37 (L) 06/24/2016 09:33 AM    LDL, calculated 94 06/24/2016 09:33 AM    Triglyceride 177 (H) 06/24/2016 09:33 AM    CHOL/HDL Ratio 3.7 02/11/2015 06:10 AM     Lab Results   Component Value Date/Time    INR 1.0 01/06/2023 09:30 AM    Prothrombin time 10.6 01/06/2023 09:30 AM      Lab Results   Component Value Date/Time    GFR est non-AA 54 (L) 07/05/2020 06:25 AM    GFRNA, POC 53 (L) 03/12/2018 12:26 PM    GFR est AA >60 07/05/2020 06:25 AM    GFRAA, POC >60 03/12/2018 12:26 PM    Creatinine 1.08 (H) 01/06/2023 09:30 AM    Creatinine (POC) 1.1 03/12/2018 12:26 PM    BUN 22 (H) 01/06/2023 09:30 AM    BUN (POC) 18 03/12/2018 12:26 PM    Sodium 136 01/06/2023 09:30 AM    Sodium (POC) 138 03/12/2018 12:26 PM    Potassium 4.9 01/06/2023 09:30 AM    Potassium (POC) 4.5 03/12/2018 12:26 PM    Chloride 105 01/06/2023 09:30 AM    Chloride (POC) 101 03/12/2018 12:26 PM    CO2 26 01/06/2023 09:30 AM    Magnesium 2.2 03/17/2015 03:50 AM    Phosphorus 2.0 (L) 03/17/2015 03:50 AM     Lab Results   Component Value Date/Time    TSH 4.290 07/18/2019 02:16 PM    TSH 1.54 02/10/2015 05:38 PM        IMPRESSION:   Obese TITA and obesity hypoventillation  Patient has TITA and obesity hypoventillation    Darron Clements MD   1/6/2023

## 2023-01-09 NOTE — H&P (VIEW-ONLY)
Preoperative Evaluation    Date of Exam: 2023    Russel Darby is a 45 y.o. female (:1970) who presents for preoperative evaluation. Procedure/Surgery:TBD  Date of Procedure/Surgery: TBD  Surgeon: Dr. Juan Jose Hall: [facility]  Primary Physician: Kajal Yen MD  Latex Allergy: no    Problem List:     Patient Active Problem List    Diagnosis Date Noted    Severe obesity (BMI 35.0-39.9) 2018    Migraine without status migrainosus, not intractable 2017    Lower abdominal pain 08/10/2016    Gastroesophageal reflux disease without esophagitis 08/10/2016    Suicidal overdose ([facility] [address] [facility] 2015    Schizoaffective disorder ([facility] [address] [facility] 2015    Cough 2015    Leukocytosis 2015    Acute respiratory failure (HCC) 2015    Normocytic anemia 2015    Toxic encephalopathy 2015    Overdose of muscle relaxant 03/15/2015    Suicidal intent 03/15/2015    Airway compromise 03/15/2015    PTSD (post-traumatic stress disorder) 2015    Borderline personality disorder     PUD (peptic ulcer disease)      Medical History:     Past Medical History:   Diagnosis Date    Back pain     Borderline personality disorder ([facility] [address] [facility]     Dysthymic disorder     Headache     Insomnia, unspecified     Post traumatic stress disorder (PTSD)     Psychosis ([facility] [address]     PUD (peptic ulcer disease)     Schizoaffective disorder ([facility] [address] [facility]      Allergies: Allergies   Allergen Reactions    Penicillin G Anaphylaxis    Aspirin Nausea and Vomiting     Pt states she isn't allergic to ASA she just can't take  due to gastric ulcers. Prednisone Other (comments)     Pt does not like the way it makes her feel. Medications:     Current Outpatient Medications   Medication Sig    promethazine (PHENERGAN) 50 mg tablet Take 1 Tablet by mouth every eight (8) hours as needed for Nausea.  1 po q 6 to 8 hours prn vomit    SUMAtriptan (IMITREX) 100 mg tablet take 1 tablet by mouth at the onset of headache. may repeat 1 tablet after 2 hours. max 2 tabs daily    clonazePAM (KlonoPIN) 1 mg tablet     DULoxetine (CYMBALTA) 60 mg capsule Take 60 mg by mouth once over twenty-four (24) hours. divalproex ER (DEPAKOTE ER) 500 mg ER tablet Take 500 mg by mouth two (2) times a day. mirtazapine (REMERON) 30 mg tablet Take 30 mg by mouth nightly. benztropine (COGENTIN) 1 mg tablet Take 1 mg by mouth two (2) times a day. cloZAPine (CLOZARIL) 100 mg tablet Take 200 mg by mouth nightly. 2 tabs @ hs    loperamide (IMODIUM) 2 mg capsule Take  by mouth. (Patient not taking: No sig reported)    lidocaine (XYLOCAINE) 4 % topical cream Apply  to affected area three (3) times daily as needed for Pain. (Patient not taking: Reported on 1/6/2023)    diclofenac EC (VOLTAREN) 75 mg EC tablet Take 1 Tab by mouth two (2) times a day. (Patient not taking: No sig reported)    lithium carbonate 300 mg tablet Take 1 Tab by mouth daily. Indications: mood stabilization (Patient taking differently: Take 300 mg by mouth two (2) times a day. take one twice a day  Indications: mood stabilization)     No current facility-administered medications for this visit.      Surgical History:     Past Surgical History:   Procedure Laterality Date    COLONOSCOPY N/A 11/8/2018    COLONOSCOPY performed by Naa Aguilar MD at [facility] ENDOSCOPY    HX CHOLECYSTECTOMY  2012    HX GI  1971    Ulcer surgery of stomach     Social History:     Social History     Socioeconomic History    Marital status: SINGLE   Tobacco Use    Smoking status: Former     Packs/day: 1.00     Years: 1.50     Pack years: 1.50     Types: Cigarettes     Quit date: 01/2013     Years since quitting: 10.0    Smokeless tobacco: Never   Vaping Use    Vaping Use: Never used   Substance and Sexual Activity    Alcohol use: No    Drug use: Not Currently     Comment: 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    Sexual activity: Not Currently     Social Determinants of Health     Financial Resource Strain: Low Risk     Difficulty of Paying Living Expenses: Not hard at all   Food Insecurity: No Food Insecurity    Worried About Running Out of Food in the Last Year: Never true    Ran Out of Food in the Last Year: Never true       Recent use of: No recent use of aspirin (ASA), NSAIDS or steroids    Tetanus up to date: last tetanus booster within 10 years      Anesthesia Complications: None  History of abnormal bleeding : None  History of Blood Transfusions: no  Health Care Directive or Living Will: no    REVIEW OF SYSTEMS:  Respiratory: positive for dyspnea on exertion or apnea  Cardiovascular: positive for dyspnea, fatigue, orthopnea  Gastrointestinal: negative    EXAM:   Visit Vitals  /70 (BP 1 Location: Right upper arm, BP Patient Position: Sitting, BP Cuff Size: Adult)   Pulse 78   Temp 97.4 °F (36.3 °C) (Temporal)   Resp 16   Ht 5' 6\" (1.676 m)   Wt 257 lb 11.2 oz (116.9 kg)   SpO2 97%   BMI 41.59 kg/m²     General appearance - alert, well appearing, and in no distress, oriented to person, place, and time, overweight, and well hydrated  Mental status - alert, oriented to person, place, and time, normal mood, behavior, speech, dress, motor activity, and thought processes, drowsy  Eyes - pupils equal and reactive, extraocular eye movements intact  Ears - bilateral TM's and external ear canals normal  Nose - normal and patent, no erythema, discharge or polyps  Mouth - mucous membranes moist, pharynx normal without lesions  Neck - supple, no significant adenopathy  Lymphatics - no palpable lymphadenopathy, no hepatosplenomegaly  Chest - clear to auscultation, no wheezes, rales or rhonchi, symmetric air entry  Heart - normal rate, regular rhythm, normal S1, S2, no murmurs, rubs, clicks or gallops  Abdomen - soft, nontender, nondistended, no masses or organomegaly  Back exam - full range of motion, no tenderness, palpable spasm or pain on motion  Neurological - alert, oriented, normal speech, no focal findings or movement disorder noted  Musculoskeletal - no joint tenderness, deformity or swelling  Extremities - peripheral pulses normal, no pedal edema, no clubbing or cyanosis  Skin - normal coloration and turgor, no rashes, no suspicious skin lesions noted      DIAGNOSTICS:   1. EKG: EKG FINDINGS - unchanged from previous tracings  2. CXR: not indicated  3.  Labs:   Lab Results   Component Value Date/Time    WBC 7.9 07/05/2020 06:25 AM    HGB 12.6 07/05/2020 06:25 AM    HCT 39.7 07/05/2020 06:25 AM    Hematocrit (POC) 38 03/12/2018 12:26 PM    PLATELET 321 50/38/8310 06:25 AM    .8 (H) 07/05/2020 06:25 AM     Lab Results   Component Value Date/Time    Hemoglobin A1c 5.1 06/24/2016 09:33 AM    Glucose 106 (H) 01/06/2023 09:30 AM    Glucose 95 02/11/2015 06:10 AM    Glucose (POC) 98 03/12/2018 12:26 PM    LDL, calculated 94 06/24/2016 09:33 AM    Creatinine (POC) 1.1 03/12/2018 12:26 PM    Creatinine 1.08 (H) 01/06/2023 09:30 AM      Lab Results   Component Value Date/Time    Cholesterol, total 166 06/24/2016 09:33 AM    HDL Cholesterol 37 (L) 06/24/2016 09:33 AM    LDL, calculated 94 06/24/2016 09:33 AM    Triglyceride 177 (H) 06/24/2016 09:33 AM    CHOL/HDL Ratio 3.7 02/11/2015 06:10 AM     Lab Results   Component Value Date/Time    INR 1.0 01/06/2023 09:30 AM    Prothrombin time 10.6 01/06/2023 09:30 AM      Lab Results   Component Value Date/Time    GFR est non-AA 54 (L) 07/05/2020 06:25 AM    GFRNA, POC 53 (L) 03/12/2018 12:26 PM    GFR est AA >60 07/05/2020 06:25 AM    GFRAA, POC >60 03/12/2018 12:26 PM    Creatinine 1.08 (H) 01/06/2023 09:30 AM    Creatinine (POC) 1.1 03/12/2018 12:26 PM    BUN 22 (H) 01/06/2023 09:30 AM    BUN (POC) 18 03/12/2018 12:26 PM    Sodium 136 01/06/2023 09:30 AM    Sodium (POC) 138 03/12/2018 12:26 PM    Potassium 4.9 01/06/2023 09:30 AM    Potassium (POC) 4.5 03/12/2018 12:26 PM    Chloride 105 01/06/2023 09:30 AM    Chloride (POC) 101 03/12/2018 12:26 PM    CO2 26 01/06/2023 09:30 AM    Magnesium 2.2 03/17/2015 03:50 AM    Phosphorus 2.0 (L) 03/17/2015 03:50 AM     Lab Results   Component Value Date/Time    TSH 4.290 07/18/2019 02:16 PM    TSH 1.54 02/10/2015 05:38 PM        IMPRESSION:   Obese TITA and obesity hypoventillation  Patient has TITA and obesity hypoventillation    Jose De La Rosa MD   1/6/2023

## 2023-01-18 ENCOUNTER — ANESTHESIA EVENT (OUTPATIENT)
Dept: SURGERY | Age: 53
End: 2023-01-18
Payer: COMMERCIAL

## 2023-01-20 ENCOUNTER — HOSPITAL ENCOUNTER (OUTPATIENT)
Age: 53
Setting detail: OUTPATIENT SURGERY
Discharge: HOME | End: 2023-01-20
Attending: PODIATRIST | Admitting: PODIATRIST
Payer: COMMERCIAL

## 2023-01-20 ENCOUNTER — ANESTHESIA (OUTPATIENT)
Dept: SURGERY | Age: 53
End: 2023-01-20
Payer: COMMERCIAL

## 2023-01-20 ENCOUNTER — APPOINTMENT (OUTPATIENT)
Dept: GENERAL RADIOLOGY | Age: 53
End: 2023-01-20
Attending: PODIATRIST
Payer: COMMERCIAL

## 2023-01-20 VITALS
SYSTOLIC BLOOD PRESSURE: 108 MMHG | BODY MASS INDEX: 40.77 KG/M2 | TEMPERATURE: 97.4 F | HEART RATE: 81 BPM | OXYGEN SATURATION: 96 % | DIASTOLIC BLOOD PRESSURE: 61 MMHG | RESPIRATION RATE: 16 BRPM | HEIGHT: 66 IN | WEIGHT: 253.68 LBS

## 2023-01-20 PROCEDURE — 74011250636 HC RX REV CODE- 250/636: Performed by: ANESTHESIOLOGY

## 2023-01-20 PROCEDURE — 77030000032 HC CUF TRNQT ZIMM -B: Performed by: PODIATRIST

## 2023-01-20 PROCEDURE — 77030031139 HC SUT VCRL2 J&J -A: Performed by: PODIATRIST

## 2023-01-20 PROCEDURE — 77030019908 HC STETH ESOPH SIMS -A: Performed by: ANESTHESIOLOGY

## 2023-01-20 PROCEDURE — 77030026438 HC STYL ET INTUB CARD -A: Performed by: ANESTHESIOLOGY

## 2023-01-20 PROCEDURE — 77030002916 HC SUT ETHLN J&J -A: Performed by: PODIATRIST

## 2023-01-20 PROCEDURE — 76210000020 HC REC RM PH II FIRST 0.5 HR: Performed by: PODIATRIST

## 2023-01-20 PROCEDURE — C1713 ANCHOR/SCREW BN/BN,TIS/BN: HCPCS | Performed by: PODIATRIST

## 2023-01-20 PROCEDURE — 77030020061 HC IV BLD WRMR ADMIN SET 3M -B: Performed by: ANESTHESIOLOGY

## 2023-01-20 PROCEDURE — 77030041049 HC RMR DISP STRY -D: Performed by: PODIATRIST

## 2023-01-20 PROCEDURE — 77030040198: Performed by: PODIATRIST

## 2023-01-20 PROCEDURE — 74011250636 HC RX REV CODE- 250/636: Performed by: NURSE ANESTHETIST, CERTIFIED REGISTERED

## 2023-01-20 PROCEDURE — 77030020268 HC MISC GENERAL SUPPLY: Performed by: PODIATRIST

## 2023-01-20 PROCEDURE — 77030002933 HC SUT MCRYL J&J -A: Performed by: PODIATRIST

## 2023-01-20 PROCEDURE — 77030008684 HC TU ET CUF COVD -B: Performed by: ANESTHESIOLOGY

## 2023-01-20 PROCEDURE — 74011000250 HC RX REV CODE- 250: Performed by: NURSE ANESTHETIST, CERTIFIED REGISTERED

## 2023-01-20 PROCEDURE — 77030003601 HC NDL NRV BLK BBMI -A

## 2023-01-20 PROCEDURE — 77030006773 HC BLD SAW OSC BRSM -A: Performed by: PODIATRIST

## 2023-01-20 PROCEDURE — 76060000035 HC ANESTHESIA 2 TO 2.5 HR: Performed by: PODIATRIST

## 2023-01-20 PROCEDURE — C1769 GUIDE WIRE: HCPCS | Performed by: PODIATRIST

## 2023-01-20 PROCEDURE — 76010000131 HC OR TIME 2 TO 2.5 HR: Performed by: PODIATRIST

## 2023-01-20 PROCEDURE — 76210000016 HC OR PH I REC 1 TO 1.5 HR: Performed by: PODIATRIST

## 2023-01-20 PROCEDURE — 74011000258 HC RX REV CODE- 258: Performed by: NURSE ANESTHETIST, CERTIFIED REGISTERED

## 2023-01-20 PROCEDURE — 64445 NJX AA&/STRD SCIATIC NRV IMG: CPT

## 2023-01-20 PROCEDURE — 77030039543 HC COUNTRSNK FIXOS DISP STRY -C: Performed by: PODIATRIST

## 2023-01-20 PROCEDURE — 77030036719 HC BIT DRL FIXOS STRY -C: Performed by: PODIATRIST

## 2023-01-20 PROCEDURE — 77030020539: Performed by: PODIATRIST

## 2023-01-20 PROCEDURE — 2709999900 HC NON-CHARGEABLE SUPPLY: Performed by: PODIATRIST

## 2023-01-20 PROCEDURE — 77030020138

## 2023-01-20 DEVICE — AUTOGRAFT HARVESTING SYSTEM
Type: IMPLANTABLE DEVICE | Site: FOOT | Status: FUNCTIONAL
Brand: FASTGRAFTER

## 2023-01-20 DEVICE — CANNULATED SCREW
Type: IMPLANTABLE DEVICE | Site: FOOT | Status: FUNCTIONAL
Brand: ASNIS

## 2023-01-20 DEVICE — LOCKING SCREW
Type: IMPLANTABLE DEVICE | Site: FOOT | Status: FUNCTIONAL
Brand: ANCHORAGE

## 2023-01-20 DEVICE — NON LOCKING SCREW
Type: IMPLANTABLE DEVICE | Site: FOOT | Status: FUNCTIONAL
Brand: ANCHORAGE

## 2023-01-20 RX ORDER — LIDOCAINE HYDROCHLORIDE 10 MG/ML
0.1 INJECTION, SOLUTION EPIDURAL; INFILTRATION; INTRACAUDAL; PERINEURAL AS NEEDED
Status: DISCONTINUED | OUTPATIENT
Start: 2023-01-20 | End: 2023-01-20 | Stop reason: HOSPADM

## 2023-01-20 RX ORDER — PROPOFOL 10 MG/ML
INJECTION, EMULSION INTRAVENOUS AS NEEDED
Status: DISCONTINUED | OUTPATIENT
Start: 2023-01-20 | End: 2023-01-20 | Stop reason: HOSPADM

## 2023-01-20 RX ORDER — HYDROMORPHONE HYDROCHLORIDE 1 MG/ML
.25-1 INJECTION, SOLUTION INTRAMUSCULAR; INTRAVENOUS; SUBCUTANEOUS
Status: DISCONTINUED | OUTPATIENT
Start: 2023-01-20 | End: 2023-01-20 | Stop reason: HOSPADM

## 2023-01-20 RX ORDER — FENTANYL CITRATE 50 UG/ML
INJECTION, SOLUTION INTRAMUSCULAR; INTRAVENOUS AS NEEDED
Status: DISCONTINUED | OUTPATIENT
Start: 2023-01-20 | End: 2023-01-20 | Stop reason: HOSPADM

## 2023-01-20 RX ORDER — ONDANSETRON 2 MG/ML
INJECTION INTRAMUSCULAR; INTRAVENOUS AS NEEDED
Status: DISCONTINUED | OUTPATIENT
Start: 2023-01-20 | End: 2023-01-20 | Stop reason: HOSPADM

## 2023-01-20 RX ORDER — DROPERIDOL 2.5 MG/ML
0.62 INJECTION, SOLUTION INTRAMUSCULAR; INTRAVENOUS ONCE
Status: COMPLETED | OUTPATIENT
Start: 2023-01-20 | End: 2023-01-20

## 2023-01-20 RX ORDER — SODIUM CHLORIDE, SODIUM LACTATE, POTASSIUM CHLORIDE, CALCIUM CHLORIDE 600; 310; 30; 20 MG/100ML; MG/100ML; MG/100ML; MG/100ML
75 INJECTION, SOLUTION INTRAVENOUS CONTINUOUS
Status: DISCONTINUED | OUTPATIENT
Start: 2023-01-20 | End: 2023-01-20 | Stop reason: HOSPADM

## 2023-01-20 RX ORDER — LIDOCAINE HYDROCHLORIDE 20 MG/ML
INJECTION, SOLUTION EPIDURAL; INFILTRATION; INTRACAUDAL; PERINEURAL AS NEEDED
Status: DISCONTINUED | OUTPATIENT
Start: 2023-01-20 | End: 2023-01-20 | Stop reason: HOSPADM

## 2023-01-20 RX ORDER — MIDAZOLAM HYDROCHLORIDE 1 MG/ML
INJECTION, SOLUTION INTRAMUSCULAR; INTRAVENOUS AS NEEDED
Status: DISCONTINUED | OUTPATIENT
Start: 2023-01-20 | End: 2023-01-20 | Stop reason: HOSPADM

## 2023-01-20 RX ORDER — ONDANSETRON 2 MG/ML
4 INJECTION INTRAMUSCULAR; INTRAVENOUS AS NEEDED
Status: DISCONTINUED | OUTPATIENT
Start: 2023-01-20 | End: 2023-01-20 | Stop reason: HOSPADM

## 2023-01-20 RX ORDER — DIPHENHYDRAMINE HYDROCHLORIDE 50 MG/ML
12.5 INJECTION, SOLUTION INTRAMUSCULAR; INTRAVENOUS AS NEEDED
Status: DISCONTINUED | OUTPATIENT
Start: 2023-01-20 | End: 2023-01-20 | Stop reason: HOSPADM

## 2023-01-20 RX ORDER — SUCCINYLCHOLINE CHLORIDE 20 MG/ML
INJECTION INTRAMUSCULAR; INTRAVENOUS AS NEEDED
Status: DISCONTINUED | OUTPATIENT
Start: 2023-01-20 | End: 2023-01-20 | Stop reason: HOSPADM

## 2023-01-20 RX ORDER — ROPIVACAINE HYDROCHLORIDE 5 MG/ML
INJECTION, SOLUTION EPIDURAL; INFILTRATION; PERINEURAL AS NEEDED
Status: DISCONTINUED | OUTPATIENT
Start: 2023-01-20 | End: 2023-01-20 | Stop reason: HOSPADM

## 2023-01-20 RX ORDER — ROCURONIUM BROMIDE 10 MG/ML
INJECTION, SOLUTION INTRAVENOUS AS NEEDED
Status: DISCONTINUED | OUTPATIENT
Start: 2023-01-20 | End: 2023-01-20 | Stop reason: HOSPADM

## 2023-01-20 RX ORDER — SODIUM CHLORIDE, SODIUM LACTATE, POTASSIUM CHLORIDE, CALCIUM CHLORIDE 600; 310; 30; 20 MG/100ML; MG/100ML; MG/100ML; MG/100ML
125 INJECTION, SOLUTION INTRAVENOUS CONTINUOUS
Status: DISCONTINUED | OUTPATIENT
Start: 2023-01-20 | End: 2023-01-20 | Stop reason: HOSPADM

## 2023-01-20 RX ADMIN — LIDOCAINE HYDROCHLORIDE 40 MG: 20 INJECTION, SOLUTION EPIDURAL; INFILTRATION; INTRACAUDAL; PERINEURAL at 07:39

## 2023-01-20 RX ADMIN — DROPERIDOL 0.62 MG: 2.5 INJECTION, SOLUTION INTRAMUSCULAR; INTRAVENOUS at 10:39

## 2023-01-20 RX ADMIN — ONDANSETRON HYDROCHLORIDE 4 MG: 2 SOLUTION INTRAMUSCULAR; INTRAVENOUS at 09:26

## 2023-01-20 RX ADMIN — CLINDAMYCIN PHOSPHATE 900 G: 150 INJECTION, SOLUTION INTRAVENOUS at 07:52

## 2023-01-20 RX ADMIN — ONDANSETRON 4 MG: 2 INJECTION INTRAMUSCULAR; INTRAVENOUS at 09:57

## 2023-01-20 RX ADMIN — PROPOFOL 160 MG: 10 INJECTION, EMULSION INTRAVENOUS at 07:39

## 2023-01-20 RX ADMIN — FENTANYL CITRATE 50 MCG: 50 INJECTION, SOLUTION INTRAMUSCULAR; INTRAVENOUS at 06:56

## 2023-01-20 RX ADMIN — MIDAZOLAM HYDROCHLORIDE 2 MG: 1 INJECTION, SOLUTION INTRAMUSCULAR; INTRAVENOUS at 06:54

## 2023-01-20 RX ADMIN — ROPIVACAINE HYDROCHLORIDE 30 ML: 5 INJECTION, SOLUTION EPIDURAL; INFILTRATION; PERINEURAL at 06:59

## 2023-01-20 RX ADMIN — SODIUM CHLORIDE, POTASSIUM CHLORIDE, SODIUM LACTATE AND CALCIUM CHLORIDE 125 ML/HR: 600; 310; 30; 20 INJECTION, SOLUTION INTRAVENOUS at 06:55

## 2023-01-20 RX ADMIN — FENTANYL CITRATE 50 MCG: 50 INJECTION, SOLUTION INTRAMUSCULAR; INTRAVENOUS at 06:54

## 2023-01-20 RX ADMIN — ROCURONIUM BROMIDE 10 MG: 10 INJECTION INTRAVENOUS at 07:39

## 2023-01-20 RX ADMIN — ROPIVACAINE HYDROCHLORIDE 10 ML: 5 INJECTION, SOLUTION EPIDURAL; INFILTRATION; PERINEURAL at 07:04

## 2023-01-20 RX ADMIN — SUCCINYLCHOLINE CHLORIDE 160 MG: 20 INJECTION, SOLUTION INTRAMUSCULAR; INTRAVENOUS at 07:39

## 2023-01-20 RX ADMIN — SODIUM CHLORIDE, POTASSIUM CHLORIDE, SODIUM LACTATE AND CALCIUM CHLORIDE: 600; 310; 30; 20 INJECTION, SOLUTION INTRAVENOUS at 06:53

## 2023-01-20 NOTE — ANESTHESIA PROCEDURE NOTES
Peripheral Block    Start time: 1/20/2023 6:54 AM  End time: 1/20/2023 7:04 AM  Performed by: Evelyn Monterroso MD  Authorized by: Evelyn Monterroso MD       Pre-procedure: Indications: at surgeon's request and post-op pain management    Preanesthetic Checklist: patient identified, risks and benefits discussed, site marked, timeout performed, anesthesia consent given, patient being monitored and fire risk safety assessment completed and verbalized    Timeout Time: 06:54 EST      Block Type:   Block Type:  Popliteal and saphenous  Laterality:  Right  Monitoring:  Continuous pulse ox, frequent vital sign checks, heart rate, responsive to questions and oxygen  Injection Technique:  Single shot  Procedures: ultrasound guided and nerve stimulator    Patient Position: supine  Prep: chlorhexidine    Needle Type:  Stimuplex  Needle Gauge:  21 G  Needle Localization:  Nerve stimulator and ultrasound guidance  Med Admin Time: 1/20/2023 7:04 AM    Assessment:  Number of attempts:  1  Injection Assessment:  Incremental injection every 5 mL, local visualized surrounding nerve on ultrasound, negative aspiration for blood, no paresthesia and no intravascular symptoms  Patient tolerance:  Patient tolerated the procedure well with no immediate complications  Saphenous block performed with ultrasound guidance; 4\" stimuplex 21g needle used, 10cc 0.5% ropivacaine injected slowly with intermittent aspiration. Isaac Garcia RN witnessed time out and block written on correct side.

## 2023-01-20 NOTE — BRIEF OP NOTE
Brief Postoperative Note    Patient: Obed Armstrong  YOB: 1970  MRN: [id number]    Date of Procedure: 1/20/2023     Pre-Op Diagnosis:   Right foot first metatarsophalangeal joint osteoarthritis  Right foot hallux rigidus    Post-Op Diagnosis: Same as preoperative diagnosis. Procedure(s):  Right foot first metatarsophalangeal joint arthrodesis  Right foot harvest of bone graft    Surgeon(s):  Shagufta Araujo DPM    Surgical Assistant: Surg Asst-1: Richard Bullock I    Anesthesia: General     Estimated Blood Loss (mL): Minimal    Complications: None    Specimens: * No specimens in log *     Implants:   Implant Name Type Inv. Item Serial No.  Lot No. LRB No. Used Action   fast grafter autograft harvesting system    [employer] [id number] Right 1 Implanted       Drains: * No LDAs found *    Findings: Right foot first metatarsophalangeal joint osteoarthritis. See operative report for details.     Electronically Signed by Edin Sheehan DPM on 1/20/2023 at 9:29 AM

## 2023-01-20 NOTE — DISCHARGE INSTRUCTIONS
Foot and Ankle Surgery Postop Care  Dr. Duyen Martino      Most importantly, go home and rest.    Elevate your foot to heart level as much as possible. You may use ice to help with the pain. If in posterior splint or soft cast, you may place ice behind the knee. Note: Frozen peas work fine :)    Loosen and rewrap ace bandage in 4 to 5 hours if it feels too tight. Do NOT bear weight on your foot unless specifically allowed to do so. (Touch down for balance is okay.)    You may bathe, but you MUST keep the dressing dry. You may take one Percocet 5/325 mg every 6 hours prn pain. Please take one Aspirin 325 mg every twelve hours until completion. Avoid making important decisions or driving until you are no longer taking the prescribed pain medication. Important signs and symptoms:      If any of the following signs and symptoms occurs, you should contact Dr. Veronica Mosquera office. Please be advised if a problem arises which you feel requires immediate medical attention or you are unable to contact Dr. Veronica Mosquera office you should seek immediate medical attention. Signs and symptoms to watch for include:    1. A sudden increase in swelling and /or redness or warmth at the area your surgery was performed which isnt relieved by rest, ice and elevation. 2.  Oral temperature greater than 101 degrees for 12 hours or more which isnt relieved by an increase in fluid intake and taking two Tylenol every 4-6 hours. Do not exceed 4000mg of Tylenol per day. 3.  Excessive drainage from your incisions or drainage that hasnt stopped by 72 hours. 4.  Calf pain, tenderness, redness or swelling which isnt relieved with rest and elevation. 5.  Fever, chills, nausea, vomiting or other signs and symptoms which are of concern to you. 6.  If sudden shortness of breath or chest pain occur, go to the ER or call an ambulance immediately! Call Dr. Denia Martino on your way to the hospital or after you arrive. Follow up:     Your Post-op appointment date & time: As scheduled  Location: [address] [facility], [address], [place], [place]    Call our office at [phone] at any time should any complications occur, especially if you develop a fever above 101° F. Dr. Wilda Chavez from your Nurse      PATIENT INSTRUCTIONS    After general anesthesia or intravenous sedation, for 24 hours or while taking prescription Narcotics:  Limit your activities  Do not drive and operate hazardous machinery  Do not make important personal or business decisions  Do  not drink alcoholic beverages  If you have not urinated within 8 hours after discharge, please contact your surgeon on call. Report the following to your surgeon:  Excessive pain, swelling, redness or odor of or around the surgical area  Temperature over 100.5  Nausea and vomiting lasting longer than 4 hours or if unable to take medications  Any signs of decreased circulation or nerve impairment to extremity: change in color, persistent  numbness, tingling, coldness or increase pain  Any questions      GOOD HELP TO FIGHT AN INFECTION  Here are a few tip to help reduce the chance of getting an infection after surgery:  Wash Your Hands  Good handwashing is the most important thing you and your caregiver can do. Wash before and after caring for any wounds. Dry your hand with a clean towel. Wash with soap and water for at least 20 seconds. A TIP: sing the \"Happy Birthday\" song through one time while washing to help with the timing. Use a hand  in between washings. Shower  When your surgeon says it is OK to take a shower, use a new bar of antibacterial soap (if that is what you use, and keep that bar of soap ONLY for your use), or antibacterial body wash. Use a clean wash cloth or sponge when you bathe.   Dry off with a clean towel  after every bath - be careful around any wounds, skin staples, sutures or surgical glue over/on wounds. Do not enter swimming pools, hot tubs, lakes, rivers and/or ocean until wounds are healed and your doctor/surgeon says it is OK. Use Clean Sheets  Sleep on freshly laundered sheets after your surgery. Keep the surgery site covered with a clean, dry bandage (if instructed to do so). If the bandage becomes soiled, reapply a new, dry, clean bandage. Do not allow pets to sleep with you while your wound is healing. Lifestyle Modification and Controlling Your Blood Sugar  Smoking slows wound healing. Stop smoking and limit exposure to second-hand smoke. High blood sugar slows wound healing. Eat a well-balanced diet to provide proper nutrition while healing  Monitor your blood sugar (if you are a diabetic) and take your medications as you are suppose to so you can control you blood sugar after surgery. COUGH AND DEEP BREATHE    Breathing deeply and coughing are very important exercises to do after surgery. Deep breathing and coughing open the little air tubes and air sacks in your lungs. You take deep breaths every day. You may not even notice - it is just something you do when you sigh or yawn. It is a natural exercise you do to keep these air passages open. After surgery, take deep breaths and cough, on purpose. DIRECTIONS:  Take 10 to 15 slow deep breaths every hour while awake. Breathe in deeply, and hold it for 2 seconds. Exhale slowly through puckered lips, like blowing up a balloon. After every 4th or 5th deep breath, hug your pillow to your chest or belly and give a hard, deep cough. Yes, it will probably hurt. But doing this exercise is a very important part of healing after surgery. Take your pain medicine to help you do this exercise without too much pain. Coughing and deep breathing help prevent bronchitis and pneumonia after surgery.   If you had chest or belly surgery, use a pillow as a \"hug jaja\" and hold it tightly to your chest or belly when you cough.       ANKLE PUMPS    Ankle pumps increase the circulation of oxygenated blood to your lower extremities and decrease your risk for circulation problems such as blood clots. They also stretch the muscles, tendons and ligaments in your foot and ankle, and prevent joint contracture in the ankle and foot, especially after surgeries on the legs. It is important to do ankle pump exercises regularly after surgery because immobility increases your risk for developing a blood clot. Your doctor may also have you take an Aspirin for the next few days as well. If your doctor did not ask you to take an Aspirin, consult with him before starting Aspirin therapy on your own. The exercise is quite simple. Slowly point your foot forward, feeling the muscles on the top of your lower leg stretch, and hold this position for 5 seconds. Next, pull your foot back toward you as far as possible, stretching the calf muscles, and hold that position for 5 seconds. Repeat with the other foot. Perform 10 repetitions every hour while awake for both ankles if possible (down and then up with the foot once is one repetition). You should feel gentle stretching of the muscles in your lower leg when doing this exercise. If you feel pain, or your range of motion is limited, don't push too hard. Only go the limit your joint and muscles will let you go. If you have increasing pain, progressively worsening leg warmth or swelling, STOP the exercise and call your doctor. MEDICATION AND   SIDE EFFECT GUIDE    The [facility] MEDICATION AND SIDE EFFECT GUIDE was provided to the PATIENT AND CARE PROVIDER. Information provided includes instruction about drug purpose and common side effects for the following medications:   none        These are general instructions for a healthy lifestyle:    *   Please give a list of your current medications to your Primary Care Provider.   *   Please update this list whenever your medications are discontinued, doses are changed, or new medications (including over-the-counter products) are added. *   Please carry medication information at all times in case of emergency situations. About Smoking  No smoking / No tobacco products  Avoid exposure to second hand smoke     Surgeon General's Warning:  Quitting smoking now greatly reduces serious risk to your health. Obesity, smoking, and sedentary lifestyle greatly increases your risk for illness and disease. A healthy diet, regular physical exercise & weight monitoring are important for maintaining a healthy lifestyle. Congestive Heart Failure  You may be retaining fluid if you have a history of heart failure or if you experience any of the following symptoms:  Weight gain of 3 pounds or more overnight or 5 pounds in a week, increased swelling in your hands or feet or shortness of breath while lying flat in bed. Please call your doctor as soon as you notice any of these symptoms; do not wait until your next office visit. Recognize signs and symptoms of STROKE:  F -  Face looks uneven  A -  Arms unable to move or move evenly  S -  Speech slurred or non-existent  T -  Time-call 911 as soon as signs and symptoms begin-DO NOT go          back to bed or wait to see if you get better-TIME IS BRAIN. Warning Signs of HEART ATTACK   Call 911 if you have these symptoms:    Chest discomfort. Most heart attacks involve discomfort in the center of the chest that lasts more than a few minutes, or that goes away and comes back. It can feel like uncomfortable pressure, squeezing, fullness, or pain. Discomfort in other areas of the upper body. Symptoms can include pain or discomfort in one or both arms, the back, neck, jaw, or stomach. Shortness of breath with or without chest discomfort. Other signs may include breaking out in a cold sweat, nausea, or lightheadedness.     Don't wait more than five minutes to call 911 - MINUTES MATTER! Fast action can save your life. Calling 911 is almost always the fastest way to get lifesaving treatment. Emergency Medical Services staff can begin treatment when they arrive -- up to an hour sooner than if someone gets to the hospital by car. Learning About Coronavirus [phone])  Coronavirus [phone]): Overview  What is coronavirus (COVID-19)? The coronavirus disease (COVID-19) is caused by a virus. It is an illness that was first found in [place], [place], in December 2019. It has since spread worldwide. The virus can cause fever, cough, and trouble breathing. In severe cases, it can cause pneumonia and make it hard to breathe without help. It can cause death. Coronaviruses are a large group of viruses. They cause the common cold. They also cause more serious illnesses like Middle East respiratory syndrome (MERS) and severe acute respiratory syndrome (SARS). COVID-19 is caused by a novel coronavirus. That means it's a new type that has not been seen in people before. This virus spreads person-to-person through droplets from coughing and sneezing. It can also spread when you are close to someone who is infected. And it can spread when you touch something that has the virus on it, such as a doorknob or a tabletop. What can you do to protect yourself from coronavirus (COVID-19)? The best way to protect yourself from getting sick is to: Avoid areas where there is an outbreak. Avoid contact with people who may be infected. Wash your hands often with soap or alcohol-based hand sanitizers. Avoid crowds and try to stay at least 6 feet away from other people. Wash your hands often, especially after you cough or sneeze. Use soap and water, and scrub for at least 20 seconds. If soap and water aren't available, use an alcohol-based hand . Avoid touching your mouth, nose, and eyes. What can you do to avoid spreading the virus to others?   To help avoid spreading the virus to others:  Cover your mouth with a tissue when you cough or sneeze. Then throw the tissue in the trash. Use a disinfectant to clean things that you touch often. Stay home if you are sick or have been exposed to the virus. Don't go to school, work, or public areas. And don't use public transportation. If you are sick:  Leave your home only if you need to get medical care. But call the doctor's office first so they know you're coming. And wear a face mask, if you have one. If you have a face mask, wear it whenever you're around other people. It can help stop the spread of the virus when you cough or sneeze. Clean and disinfect your home every day. Use household  and disinfectant wipes or sprays. Take special care to clean things that you grab with your hands. These include doorknobs, remote controls, phones, and handles on your refrigerator and microwave. And don't forget countertops, tabletops, bathrooms, and computer keyboards. When to call for help  Call 911 anytime you think you may need emergency care. For example, call if:  You have severe trouble breathing. (You can't talk at all.)  You have constant chest pain or pressure. You are severely dizzy or lightheaded. You are confused or can't think clearly. Your face and lips have a blue color. You pass out (lose consciousness) or are very hard to wake up. Call your doctor now if you develop symptoms such as:  Shortness of breath. Fever. Cough. If you need to get care, call ahead to the doctor's office for instructions before you go. Make sure you wear a face mask, if you have one, to prevent exposing other people to the virus. Where can you get the latest information? The following health organizations are tracking and studying this virus. Their websites contain the most up-to-date information. Suzanne Hoffman also learn what to do if you think you may have been exposed to the virus. U.S. Centers for Disease Control and Prevention (CDC):  The CDC provides updated news about the disease and travel advice. The website also tells you how to prevent the spread of infection. www.cdc.gov  World Health Organization [facility]): WHO offers information about the virus outbreaks. WHO also has travel advice. www.who.int  Current as of: April 1, 2020               Content Version: 12.4  © 2006-2020 [employer]. Care instructions adapted under license by your healthcare professional. If you have questions about a medical condition or this instruction, always ask your healthcare professional. [address] any warranty or liability for your use of this information. The discharge information has been reviewed with the friends. Any questions and concerns from the friends have been addressed. The friends verbalized understanding. CONTENTS FOUND IN YOUR DISCHARGE ENVELOPE:  [x]     Surgeon and Hospital Discharge Instructions  [x]     [facility] Surgical Services Care Provider Card  [x]     Medication & Side Effect Guide            (your newly prescribed medications have been marked/highlighted showing the most common side effects from   the classes of drugs on your prescriptions)  []     Medication Prescription(s) x 0 ( [] These have been sent electronically to your pharmacy by your surgeon,   - OR -       your surgeon has already provided these to you during a previous/pre-op office visit)  []     [address]  []     Physical Therapy Prescription  []     Follow-up Appointment Cards  []     Surgery-related Pictures/Media  []     Pain block and/or block with On-Q Catheter from Anesthesia Service (information included in your instructions above)  []     Medical device information sheets/pamphlets from their    []     School/work excuse note. []     /parent work excuse note.       The following personal items collected during your admission are returned to you:   Dental Appliance: Dental Appliances: None  Vision: Visual Aid: None  Hearing Aid:    Jewelry:    Clothing:    Other Valuables:    Valuables sent to safe:

## 2023-01-20 NOTE — ANESTHESIA PREPROCEDURE EVALUATION
Anesthetic History   No history of anesthetic complications            Review of Systems / Medical History  Patient summary reviewed, nursing notes reviewed and pertinent labs reviewed    Pulmonary  Within defined limits                 Neuro/Psych         Headaches and psychiatric history     Cardiovascular  Within defined limits                     GI/Hepatic/Renal           PUD     Endo/Other        Obesity     Other Findings              Physical Exam    Airway  Mallampati: III  TM Distance: > 6 cm  Neck ROM: normal range of motion   Mouth opening: Normal     Cardiovascular  Regular rate and rhythm,  S1 and S2 normal,  no murmur, click, rub, or gallop             Dental    Dentition: Edentulous     Pulmonary  Breath sounds clear to auscultation               Abdominal  GI exam deferred       Other Findings            Anesthetic Plan    ASA: 3  Anesthesia type: general      Post-op pain plan if not by surgeon: peripheral nerve block single    Induction: Intravenous  Anesthetic plan and risks discussed with: Patient

## 2023-01-20 NOTE — INTERVAL H&P NOTE
Update History & Physical    The Patient's History and Physical of January 6, 2023 was reviewed with the patient and I examined the patient. There was no change. The surgical site was confirmed by the patient and me. Plan:  The risk, benefits, expected outcome, and alternative to the recommended procedure have been discussed with the patient. Patient understands and wants to proceed with the procedure.     Electronically signed by Elyse Pryor DPM on 1/20/2023 at 7:35 AM

## 2023-01-20 NOTE — ANESTHESIA POSTPROCEDURE EVALUATION
Procedure(s):  RIGHT FOOT FIRST METATARSOPHALANGEAL JOINT ARTHRODESIS, RIGHT FOOT HARVEST OF BONE GRAFT. general, regional    Anesthesia Post Evaluation        Patient location during evaluation: bedside  Level of consciousness: awake  Pain management: satisfactory to patient  Airway patency: patent  Anesthetic complications: no  Cardiovascular status: acceptable  Respiratory status: acceptable  Hydration status: acceptable        INITIAL Post-op Vital signs:   Vitals Value Taken Time   /62 01/20/23 1045   Temp 36.3 °C (97.4 °F) 01/20/23 1030   Pulse 81 01/20/23 1052   Resp 17 01/20/23 1052   SpO2 97 % 01/20/23 1052   Vitals shown include unvalidated device data.

## 2023-01-21 NOTE — OP NOTES
Shayne Crews [facility] 77  OPERATIVE REPORT    Name:  Suze Vieira  MR#:  [id number]  :  1970  ACCOUNT #:  [de-identified]  DATE OF SERVICE:  2023    PREOPERATIVE DIAGNOSES:  1. Right foot first metatarsophalangeal joint osteoarthritis. 2.  Right foot hallux rigidus. POSTOPERATIVE DIAGNOSES:  1. Right foot first metatarsophalangeal joint osteoarthritis. 2.  Right foot hallux rigidus. PROCEDURES PERFORMED:  1. Right foot first metatarsophalangeal joint arthrodesis. 2.  Right foot harvest of bone graft major. SURGEON:  Sujata Jenkins DPM    ASSISTANT:  Danica Rondon. ANESTHESIA:  General with popliteal block. COMPLICATIONS:  None. SPECIMENS REMOVED:  None. IMPLANTS/MATERIALS:  1.  Gloria metatarsophalangeal joint plate with associated locking and nonlocking screws. 2.  One 4.0 mm x 30 mm cannulated partially threaded Gibsonia screw. ESTIMATED BLOOD LOSS:  Minimal.    HEMOSTASIS:  Ankle tourniquet at 250 mmHg. INJECTABLES:  None. INDICATIONS FOR PROCEDURE:  This patient is a 73-year-old female patient of mine presenting with right foot pain secondary to above-mentioned diagnoses. The patient has exhausted all conservative measures at this time and has elected to undergo surgical intervention. All risks, benefits, indications, complications and alternatives were discussed at length with the patient. All questions were answered to the patient's apparent satisfaction. The signed informed consent was placed in the chart. PROCEDURE IN DETAIL:  The patient was brought from the preoperative holding area to the operating room and placed on the operating table and secured in supine position. The above-mentioned anesthesia was administered per the Anesthesia team.  A well-padded pneumatic ankle tourniquet was applied to the right lower extremity. The right lower extremity was then prepped and draped utilizing sterile aseptic technique and ChloraPrep scrub.   A time-out was performed and all were in agreement. The right lower extremity was then elevated and the tourniquet was inflated to 250 mmHg. Attention was directed to the lateral aspect of the calcaneus. Utilizing a #15 blade, a small incision was made to the lateral aspect of the calcaneus. The incision was carried down through skin and subcutaneous tissue with care to protect all neurovascular structures. The incision was deepened down to the level of the joint utilizing a curved hemostat. I then utilized the [employer] to obtain calcaneal autograft. I made multiple passes with the grafter and obtained graft that was larger than the size of a button or a dowel. This was then passed to the back table to be saved for later use in the arthrodesis site. The incision site was then flushed with copious amounts of normal sterile saline. The incision sites were then closed utilizing nylon for skin. Attention was directed to the first metatarsophalangeal joint. There was noted to be a palpable spurring as well as significant limitation with range of motion of the first metatarsophalangeal joint. Utilizing #15 blade, a linear incision was made over the first metatarsophalangeal joint. The incision was carried down through skin and subcutaneous tissue with care to protect all neurovascular structures. A linear capsulotomy was then performed and the soft tissue and periosteum were then reflected off of the head of the first metatarsal base of the proximal phalanx. There was noted to be significant cartilage loss to the head of the first metatarsal and the base of the proximal phalanx. Utilizing the cup and cone reamers, any remaining cartilage was then resected off the head of the first metatarsal and base of the proximal phalanx. I then utilized the K-wire to fenestrate the joint on either side. I then placed a 4.0 mm *** wire across the joint.   I then drilled across the joint and placed a 4.0 mm x 30 mm cannulated partially-threaded Gloria screw. There was noted to be adequate compression across the arthrodesis site. I then placed the Gloria first MTP plate over the first metatarsophalangeal joint and the associated locking and nonlocking screws into the plate holes. There was adequate positioning of the guidewire and adequate compression across the first tarsometatarsal joint as verified on intraoperative fluoroscopy. Prior to placing a plate, I had obtained calcaneal autograft within the fusion site. The incision site was then flushed with copious amounts of normal sterile saline. Incision sites were then closed in layers utilizing Vicryl for deep tissue and nylon for skin. The incision was then dressed with Xeroform, 4x4's, Camilo, and 4-inch Ace bandage. The tourniquet was let down and a prompt hyperemic response was noted to all the digits. The patient tolerated the procedure and anesthesia well without complications. The patient was transferred from the OR to the PACU with vital signs stable and vascular status unchanged to the right lower extremity. The patient is to be discharged home per PACU protocol and will follow up in my office for postoperative care.         JESUS Styles/[id number]  D:  01/20/2023 11:33  T:  01/20/2023 23:59  JOB #:  [id number]

## 2023-01-28 NOTE — ED NOTES
Patient discharged by provider. Papers given, education provided, and questions answered. Discharged home with family. Pt continues on AVAPS continuously throughout entirety of shift. Pt remains  On AVAPS 16/500/PMAX/40 PMIN12/EPAP 8, 30%. Aggressive diuresis down 11,000+ mL since midnight.     Pt only triggering 30-60% breaths at max. arousable to voice and touch but does fall back asleep quickly. Seen by treatment team and plan is to continue current settings. Vent outside of room for low threshold of intubation. RN aware of findings and discussed with RT at bedside.         Sheila Majano, LRT, BSRT-RRT

## 2023-02-02 ENCOUNTER — TELEPHONE (OUTPATIENT)
Dept: FAMILY MEDICINE CLINIC | Age: 53
End: 2023-02-02

## 2023-02-02 NOTE — TELEPHONE ENCOUNTER
----- Message from Robert Bender MD sent at 2/1/2023  6:29 PM EST -----  You kidney function shows that your body is not storing enough proteins and your kidneys are dehydrated. I would advise to start by increasing fluid intake to 64 ounces, increase you fiber intake by taking an over the counter fiber supplement. Decrease sugary foods, milk, creamer and fried foods.

## 2023-03-27 ENCOUNTER — TELEPHONE (OUTPATIENT)
Dept: NEUROLOGY | Age: 53
End: 2023-03-27

## 2023-03-27 DIAGNOSIS — G43.909 MIGRAINE WITHOUT STATUS MIGRAINOSUS, NOT INTRACTABLE, UNSPECIFIED MIGRAINE TYPE: ICD-10-CM

## 2023-03-27 NOTE — TELEPHONE ENCOUNTER
Patient left a message with the answering service that she needs more migraine medication. She didn't leave a name of which one.

## 2023-03-29 RX ORDER — SUMATRIPTAN 100 MG/1
TABLET, FILM COATED ORAL
Qty: 9 TABLET | Refills: 2 | Status: CANCELLED | OUTPATIENT
Start: 2023-03-29

## 2023-03-29 NOTE — TELEPHONE ENCOUNTER
Called pt. Verified. Pt states that she needs a refill on the Imitrex. Pt also states that she has change pharmacy to [facility].

## 2023-03-30 NOTE — TELEPHONE ENCOUNTER
Called pt. Verified. Inform pt that a follow up appointment needs to be made to receive refills on medication. Inform pt that it has been 1 year since seen in office. Pt verbalizes understanding and states that she will call the office back to schedule an appt.

## 2023-04-18 ENCOUNTER — TELEPHONE (OUTPATIENT)
Dept: NEUROLOGY | Age: 53
End: 2023-04-18

## 2023-04-18 DIAGNOSIS — G43.909 MIGRAINE WITHOUT STATUS MIGRAINOSUS, NOT INTRACTABLE, UNSPECIFIED MIGRAINE TYPE: ICD-10-CM

## 2023-04-18 NOTE — TELEPHONE ENCOUNTER
Patient needs a refill on the following medications    Promethazine & Imitrex    Patient has a follow up appointment in June.       Please contact

## 2023-04-20 RX ORDER — SUMATRIPTAN 100 MG/1
TABLET, FILM COATED ORAL
Qty: 9 TABLET | Refills: 2 | Status: SHIPPED | OUTPATIENT
Start: 2023-04-20

## 2023-04-20 RX ORDER — PROMETHAZINE HYDROCHLORIDE 50 MG/1
TABLET ORAL
Qty: 30 TABLET | Refills: 1 | Status: SHIPPED | OUTPATIENT
Start: 2023-04-20

## 2023-04-20 NOTE — TELEPHONE ENCOUNTER
Called pt. Verified. Inform pt that Dr. Ward Gave sent Promethazine and Imitrex to pharmacy. Pt verbalizes understanding and states that she is aware.
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
## 2023-05-18 NOTE — TELEPHONE ENCOUNTER
Pt states her migraines have gotten worse and having them more frequently. Requesting call back to discuss. Also requesting refill on promethazine (PHENERGAN) 50 mg tablet and SUMAtriptan (IMITREX) 100 mg tablet. Please send refill to [facility] on file.

## 2023-05-19 RX ORDER — PROMETHAZINE HCL 50 MG
TABLET ORAL
Qty: 30 TABLET | Refills: 1 | Status: SHIPPED | OUTPATIENT
Start: 2023-05-19

## 2023-05-19 RX ORDER — SUMATRIPTAN 100 MG/1
TABLET, FILM COATED ORAL
Qty: 9 TABLET | Refills: 3 | Status: SHIPPED | OUTPATIENT
Start: 2023-05-19

## 2023-06-02 NOTE — TELEPHONE ENCOUNTER
----- Message from [employer] sent at 7/26/2021  2:23 PM EDT -----  Regarding: Dr. Robert Laughlin (if not patient):      Relationship of caller (if not patient):      Best contact number(s): [phone]      Name of medication and dosage if known: Phenagren and Imitrex       Is patient out of this medication (yes/no): yes      Pharmacy name: [facility] listed in chart? (yes/no): yes  Pharmacy phone number:      Details to clarify the request:      Branden Lopez Goal Outcome Evaluation:  Plan of Care Reviewed With: patient        Progress: improving   Pt being d/c home with HH today. Pt is a&ox4. Ambulating x1 with walker. Up in chair today. PRN Percocet given once this morning for c/o pain with good relief noted. PRICE LAGOS.

## 2023-07-03 NOTE — TELEPHONE ENCOUNTER
Patient needs a refill on her Promethazine she is completley out. Patient has an appointment in September.     Please contact

## 2023-07-07 RX ORDER — PROMETHAZINE HCL 50 MG
TABLET ORAL
Qty: 30 TABLET | Refills: 1 | Status: SHIPPED | OUTPATIENT
Start: 2023-07-07

## 2023-07-28 RX ORDER — PROMETHAZINE HCL 50 MG
TABLET ORAL
Qty: 30 TABLET | Refills: 11 | OUTPATIENT
Start: 2023-07-28

## 2023-10-23 NOTE — TELEPHONE ENCOUNTER
Patient is requesting medication refills on Imitrex 100mg and Promethazine 50mg.  Has a f/u appt scheduled for 1/23/23 at 10am.

## 2023-10-25 RX ORDER — PROMETHAZINE HCL 50 MG
TABLET ORAL
Qty: 30 TABLET | Refills: 1 | Status: SHIPPED | OUTPATIENT
Start: 2023-10-25

## 2023-10-25 RX ORDER — SUMATRIPTAN 100 MG/1
TABLET, FILM COATED ORAL
Qty: 9 TABLET | Refills: 3 | Status: SHIPPED | OUTPATIENT
Start: 2023-10-25

## 2023-11-27 RX ORDER — PROMETHAZINE HCL 50 MG
TABLET ORAL
Qty: 30 TABLET | Refills: 4 | Status: SHIPPED | OUTPATIENT
Start: 2023-11-27

## 2024-03-06 RX ORDER — PROMETHAZINE HCL 50 MG
TABLET ORAL
Qty: 30 TABLET | Refills: 4 | Status: SHIPPED | OUTPATIENT
Start: 2024-03-06

## 2024-04-03 RX ORDER — SUMATRIPTAN 100 MG/1
TABLET, FILM COATED ORAL
Qty: 9 TABLET | Refills: 3 | Status: SHIPPED | OUTPATIENT
Start: 2024-04-03

## 2024-04-18 ENCOUNTER — TELEPHONE (OUTPATIENT)
Age: 54
End: 2024-04-18

## 2024-04-23 NOTE — TELEPHONE ENCOUNTER
Called pt.  verified. Inform pt that Dr. Hancock sent the Sumatriptan medication to the [place] pharmacy on 4/3/24 with 3 refills. Pt states that she has received medication.
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
## 2024-04-26 ENCOUNTER — OFFICE VISIT (OUTPATIENT)
Age: 54
End: 2024-04-26
Payer: COMMERCIAL

## 2024-04-26 VITALS
DIASTOLIC BLOOD PRESSURE: 70 MMHG | TEMPERATURE: 96 F | HEART RATE: 87 BPM | HEIGHT: 66 IN | SYSTOLIC BLOOD PRESSURE: 120 MMHG | OXYGEN SATURATION: 98 % | BODY MASS INDEX: 40.95 KG/M2

## 2024-04-26 DIAGNOSIS — G43.E09 CHRONIC MIGRAINE WITH AURA WITHOUT STATUS MIGRAINOSUS, NOT INTRACTABLE: Primary | ICD-10-CM

## 2024-04-26 PROCEDURE — 99214 OFFICE O/P EST MOD 30 MIN: CPT | Performed by: PSYCHIATRY & NEUROLOGY

## 2024-04-26 RX ORDER — RIMEGEPANT SULFATE 75 MG/75MG
1 TABLET, ORALLY DISINTEGRATING ORAL EVERY OTHER DAY
Qty: 16 TABLET | Refills: 5 | Status: SHIPPED | OUTPATIENT
Start: 2024-04-26

## 2024-04-27 ENCOUNTER — TELEPHONE (OUTPATIENT)
Age: 54
End: 2024-04-27

## 2024-06-10 RX ORDER — SUMATRIPTAN 100 MG/1
TABLET, FILM COATED ORAL
Qty: 9 TABLET | Refills: 3 | Status: SHIPPED | OUTPATIENT
Start: 2024-06-10

## 2024-07-08 ENCOUNTER — TELEPHONE (OUTPATIENT)
Age: 54
End: 2024-07-08

## 2024-07-08 NOTE — TELEPHONE ENCOUNTER
Called pt.  verified. Inform pt that the nurtec does not come in the injectable and that Dr. Hancock sent in a nurtec on 24 with 5 refills. Inform pt to contact [facility] pharmacy for medication refill.   Verified Results  COMP METABOLIC PANEL WITH CBCA, LIPID PANEL AND TSH (CMP,CBCA,LIPFA,TSH) 07Mar2017 10:05AM GERRI ONEAL   [Mar 8, 2017 11:19AM GERRI ONEAL]  blood tests are stable.  thank you     Test Name Result Flag Reference   WHITE BLOOD COUNT 5.5 K/mcL  4.2-11.0   RED CELL COUNT 4.08 mil/mcL  4.00-5.20   HEMOGLOBIN 12.4 g/dl  12.0-15.5   HEMATOCRIT 38.0 %  36.0-46.5   MEAN CORPUSCULAR VOLUME 93.1 fL  78.0-100.0   MEAN CORPUSCULAR HEMOGLOBIN 30.4 pg  26.0-34.0   MEAN CORPUSCULAR HGB CONC 32.6 g/dl  32.0-36.5   RDW-CV 14.2 %  11.0-15.0   PLATELET COUNT 333 K/mcL  140-450   DIFF TYPE      AUTOMATED DIFFERENTIAL   ROEL% 27 %     LYM% 65 %     MON% 7 %     EOS% 1 %     BASO% 0 %     ROEL ABS 1.5 K/mcL L 1.8-7.7   LYM ABS 3.6 K/mcL  1.0-4.0   MON ABS 0.4 K/mcL  0.3-0.9   EOS ABS 0.1 K/mcL  0.1-0.5   BASO ABS 0.0 K/mcL  0.0-0.3   FASTING STATUS UNKNOWN hrs     SODIUM 139 mmol/L  135-145   POTASSIUM 3.9 mmol/L  3.4-5.1   CHLORIDE 100 mmol/L     CARBON DIOXIDE 26 mmol/L  21-32   ANION GAP 17 mmol/L  10-20   GLUCOSE 136 mg/dl H 65-99   BUN 9 mg/dl  6-20   CREATININE 0.98 mg/dl H 0.51-0.95   GFR EST. AMER 61     In patients with known chronic kidney disease, the stage of disease based on eGFR is interpreted as follows:  eGFR results 60-89 mL/min/1.73m2 = Stage II CKD (chronic kidney disease), or mild kidney disease.   GFR EST.NONAFRI AMER 52     In patients with known chronic kidney disease, the stage of disease based on eGFR is interpreted as follows:  eGFR results 30 - 59 mL/min/1.73m2 = Stage III CKD (chronic kidney disease), or moderate kidney disease.   BUN/CREATININE RATIO 9  7-25   CALCIUM 9.6 mg/dl  8.4-10.2   BILIRUBIN TOTAL 0.4 mg/dl  0.2-1.0   GOT/AST 20 Units/L  <38   GPT/ALT 36 Units/L  <79   ALKALINE PHOSPHATASE 76 Units/L     TOTAL PROTEIN 7.0 g/dl  6.4-8.2   ALBUMIN 3.7 g/dl  3.6-5.1   GLOBULIN (CALCULATED) 3.3 g/dl  2.0-4.0   A/G RATIO 1.1  1.0-2.4   TSH 2.045 mcUnits/mL  0.350-5.000    FASTING STATUS UNKNOWN hrs     CHOLESTEROL 189 mg/dl  <200   Desirable            <200  Borderline High      200 to 239  High                 >=240   LDL CHOLESTEROL (CALCULATED) 96 mg/dl  <130   OPTIMAL               <100  NEAR OPTIMAL          100-129  BORDERLINE HIGH       130-159  HIGH                  160-189  VERY HIGH             >=190   HDL CHOLESTEROL 79 mg/dl  >59   Low            <40  Borderline Low 40 to 49  Near Optimal   50 to 59  Optimal        >=60   TRIGLYCERIDES 72 mg/dl  <150   Normal                   <150  Borderline High          150 to 199  High                     200 to 499  Very High                >=500   NON-HDL CHOLESTEROL 110 mg/dl     Therapeutic Target:  CHD and risk equivalents <130  Multiple risk factors    <160  0 to 1 risk factors      <190   CHOLESTEROL/HDL RATIO 2.4  <4.5     MICROALBUMIN, URINE 07Mar2017 10:05AM GERRI ONEAL   [Mar 8, 2017 11:19AM GERRI ONEAL]  blood tests are stable.  thank you     Test Name Result Flag Reference   MICROALBUMIN 1.30 mg/dl     CREATININE RANDOM URINE 110.00 mg/dl     MICROALB/CREAT RATIO 11.8 mcg/mg  <30     HEMOGLOBIN A1C GLYCOSYLATED 07Mar2017 10:05AM GERRI ONEAL   [Mar 8, 2017 11:19AM GERRI ONEAL]  blood tests are stable.  thank you     Test Name Result Flag Reference   HEMOGLOBIN A1C GLYH 7.6 % H 4.5-5.6   ----DIABETIC SCREENING---  NON DIABETIC                 <5.7%  INCREASED RISK                5.7-6.4%  DIAGNOSTIC FOR DIABETES      >6.4%     ----DIABETIC CONTROL---     A1C%           eAG mg/dL  6.0            126  6.5            140  7.0            154  7.5            169  8.0            183  8.5            197  9.0            212  9.5            226  10.0           240     VITAMIN B12 07Mar2017 10:05AM GERRI ONEAL   [Mar 8, 2017 11:19AM GERRI ONEAL]  blood tests are stable.  thank you     Test Name Result Flag Reference   VITAMIN B12 560 pg/ml  211-911

## 2024-07-20 DIAGNOSIS — G43.E09 CHRONIC MIGRAINE WITH AURA WITHOUT STATUS MIGRAINOSUS, NOT INTRACTABLE: Primary | ICD-10-CM

## 2024-07-22 RX ORDER — PROMETHAZINE HCL 50 MG
TABLET ORAL
Qty: 30 TABLET | Refills: 4 | Status: SHIPPED | OUTPATIENT
Start: 2024-07-22

## 2024-09-19 ENCOUNTER — APPOINTMENT (OUTPATIENT)
Facility: HOSPITAL | Age: 54
End: 2024-09-19
Payer: COMMERCIAL

## 2024-09-19 ENCOUNTER — HOSPITAL ENCOUNTER (EMERGENCY)
Facility: HOSPITAL | Age: 54
Discharge: HOME | End: 2024-09-19
Attending: EMERGENCY MEDICINE
Payer: COMMERCIAL

## 2024-09-19 VITALS
DIASTOLIC BLOOD PRESSURE: 60 MMHG | HEART RATE: 78 BPM | BODY MASS INDEX: 38.74 KG/M2 | SYSTOLIC BLOOD PRESSURE: 126 MMHG | WEIGHT: 240 LBS | OXYGEN SATURATION: 98 % | RESPIRATION RATE: 12 BRPM

## 2024-09-19 DIAGNOSIS — R19.7 DIARRHEA, UNSPECIFIED TYPE: ICD-10-CM

## 2024-09-19 DIAGNOSIS — R07.9 CHEST PAIN, UNSPECIFIED TYPE: Primary | ICD-10-CM

## 2024-09-19 LAB
ALBUMIN SERPL-MCNC: 3.4 G/DL (ref 3.5–5)
ALBUMIN/GLOB SERPL: 1.1 (ref 1.1–2.2)
ALP SERPL-CCNC: 116 U/L (ref 45–117)
ALT SERPL-CCNC: 23 U/L (ref 12–78)
AMPHET UR QL SCN: NEGATIVE
ANION GAP SERPL CALC-SCNC: 5 MMOL/L (ref 2–12)
AST SERPL-CCNC: 22 U/L (ref 15–37)
BARBITURATES UR QL SCN: NEGATIVE
BASOPHILS # BLD: 0 K/UL (ref 0–0.1)
BASOPHILS NFR BLD: 0 % (ref 0–1)
BENZODIAZ UR QL: NEGATIVE
BILIRUB SERPL-MCNC: 0.4 MG/DL (ref 0.2–1)
BUN SERPL-MCNC: 15 MG/DL (ref 6–20)
BUN/CREAT SERPL: 15 (ref 12–20)
CALCIUM SERPL-MCNC: 9.3 MG/DL (ref 8.5–10.1)
CANNABINOIDS UR QL SCN: NEGATIVE
CHLORIDE SERPL-SCNC: 102 MMOL/L (ref 97–108)
CK SERPL-CCNC: 89 U/L (ref 26–192)
CO2 SERPL-SCNC: 32 MMOL/L (ref 21–32)
COCAINE UR QL SCN: NEGATIVE
CREAT SERPL-MCNC: 1.03 MG/DL (ref 0.55–1.02)
D DIMER PPP FEU-MCNC: 0.37 MG/L FEU (ref 0–0.65)
DIFFERENTIAL METHOD BLD: ABNORMAL
EOSINOPHIL # BLD: 0.1 K/UL (ref 0–0.4)
EOSINOPHIL NFR BLD: 2 % (ref 0–7)
ERYTHROCYTE [DISTWIDTH] IN BLOOD BY AUTOMATED COUNT: 13.1 % (ref 11.5–14.5)
ETHANOL SERPL-MCNC: <10 MG/DL (ref 0–0.08)
GLOBULIN SER CALC-MCNC: 3.2 G/DL (ref 2–4)
GLUCOSE SERPL-MCNC: 141 MG/DL (ref 65–100)
HCT VFR BLD AUTO: 38.4 % (ref 35–47)
HGB BLD-MCNC: 12.6 G/DL (ref 11.5–16)
IMM GRANULOCYTES # BLD AUTO: 0 K/UL (ref 0–0.04)
IMM GRANULOCYTES NFR BLD AUTO: 1 % (ref 0–0.5)
LYMPHOCYTES # BLD: 2.7 K/UL (ref 0.8–3.5)
LYMPHOCYTES NFR BLD: 39 % (ref 12–49)
Lab: NORMAL
MAGNESIUM SERPL-MCNC: 2 MG/DL (ref 1.6–2.4)
MCH RBC QN AUTO: 31.3 PG (ref 26–34)
MCHC RBC AUTO-ENTMCNC: 32.8 G/DL (ref 30–36.5)
MCV RBC AUTO: 95.5 FL (ref 80–99)
METHADONE UR QL: NEGATIVE
MONOCYTES # BLD: 0.7 K/UL (ref 0–1)
MONOCYTES NFR BLD: 10 % (ref 5–13)
NEUTS SEG # BLD: 3.2 K/UL (ref 1.8–8)
NEUTS SEG NFR BLD: 48 % (ref 32–75)
NRBC # BLD: 0 K/UL (ref 0–0.01)
NRBC BLD-RTO: 0 PER 100 WBC
NT PRO BNP: 50 PG/ML (ref 0–125)
OPIATES UR QL: NEGATIVE
PCP UR QL: NEGATIVE
PLATELET # BLD AUTO: 165 K/UL (ref 150–400)
PMV BLD AUTO: 11 FL (ref 8.9–12.9)
POTASSIUM SERPL-SCNC: 4.9 MMOL/L (ref 3.5–5.1)
PROT SERPL-MCNC: 6.6 G/DL (ref 6.4–8.2)
RBC # BLD AUTO: 4.02 M/UL (ref 3.8–5.2)
SODIUM SERPL-SCNC: 139 MMOL/L (ref 136–145)
TROPONIN I SERPL HS-MCNC: <4 NG/L (ref 0–51)
TROPONIN I SERPL HS-MCNC: <4 NG/L (ref 0–51)
WBC # BLD AUTO: 6.8 K/UL (ref 3.6–11)

## 2024-09-19 PROCEDURE — 82550 ASSAY OF CK (CPK): CPT

## 2024-09-19 PROCEDURE — 82077 ASSAY SPEC XCP UR&BREATH IA: CPT

## 2024-09-19 PROCEDURE — 71045 X-RAY EXAM CHEST 1 VIEW: CPT

## 2024-09-19 PROCEDURE — 93005 ELECTROCARDIOGRAM TRACING: CPT | Performed by: EMERGENCY MEDICINE

## 2024-09-19 PROCEDURE — 84484 ASSAY OF TROPONIN QUANT: CPT

## 2024-09-19 PROCEDURE — 99285 EMERGENCY DEPT VISIT HI MDM: CPT

## 2024-09-19 PROCEDURE — 2580000003 HC RX 258: Performed by: EMERGENCY MEDICINE

## 2024-09-19 PROCEDURE — 80053 COMPREHEN METABOLIC PANEL: CPT

## 2024-09-19 PROCEDURE — 6370000000 HC RX 637 (ALT 250 FOR IP): Performed by: EMERGENCY MEDICINE

## 2024-09-19 PROCEDURE — 83735 ASSAY OF MAGNESIUM: CPT

## 2024-09-19 PROCEDURE — 83880 ASSAY OF NATRIURETIC PEPTIDE: CPT

## 2024-09-19 PROCEDURE — 36415 COLL VENOUS BLD VENIPUNCTURE: CPT

## 2024-09-19 PROCEDURE — 85379 FIBRIN DEGRADATION QUANT: CPT

## 2024-09-19 PROCEDURE — 85025 COMPLETE CBC W/AUTO DIFF WBC: CPT

## 2024-09-19 PROCEDURE — 80307 DRUG TEST PRSMV CHEM ANLYZR: CPT

## 2024-09-19 RX ORDER — 0.9 % SODIUM CHLORIDE 0.9 %
1000 INTRAVENOUS SOLUTION INTRAVENOUS ONCE
Status: COMPLETED | OUTPATIENT
Start: 2024-09-19 | End: 2024-09-19

## 2024-09-19 RX ORDER — ASPIRIN 81 MG/1
324 TABLET, CHEWABLE ORAL ONCE
Status: COMPLETED | OUTPATIENT
Start: 2024-09-19 | End: 2024-09-19

## 2024-09-19 RX ADMIN — SODIUM CHLORIDE 1000 ML: 9 INJECTION, SOLUTION INTRAVENOUS at 02:00

## 2024-09-19 RX ADMIN — NITROGLYCERIN 1 INCH: 20 OINTMENT TOPICAL at 01:50

## 2024-09-19 RX ADMIN — ASPIRIN 324 MG: 81 TABLET, CHEWABLE ORAL at 01:49

## 2024-09-19 ASSESSMENT — PAIN DESCRIPTION - FREQUENCY
FREQUENCY: INTERMITTENT
FREQUENCY: INTERMITTENT

## 2024-09-19 ASSESSMENT — PAIN - FUNCTIONAL ASSESSMENT
PAIN_FUNCTIONAL_ASSESSMENT: ACTIVITIES ARE NOT PREVENTED
PAIN_FUNCTIONAL_ASSESSMENT: 0-10
PAIN_FUNCTIONAL_ASSESSMENT: PREVENTS OR INTERFERES SOME ACTIVE ACTIVITIES AND ADLS

## 2024-09-19 ASSESSMENT — PAIN SCALES - GENERAL
PAINLEVEL_OUTOF10: 5
PAINLEVEL_OUTOF10: 8

## 2024-09-19 ASSESSMENT — PAIN DESCRIPTION - ORIENTATION
ORIENTATION: LEFT
ORIENTATION: LEFT

## 2024-09-19 ASSESSMENT — PAIN DESCRIPTION - DESCRIPTORS
DESCRIPTORS: ACHING
DESCRIPTORS: ACHING

## 2024-09-19 ASSESSMENT — PAIN DESCRIPTION - LOCATION
LOCATION: CHEST
LOCATION: CHEST

## 2024-09-19 ASSESSMENT — PAIN DESCRIPTION - ONSET
ONSET: SUDDEN
ONSET: ON-GOING

## 2024-09-19 ASSESSMENT — PAIN DESCRIPTION - PAIN TYPE
TYPE: ACUTE PAIN
TYPE: ACUTE PAIN

## 2024-09-20 LAB
EKG ATRIAL RATE: 76 BPM
EKG ATRIAL RATE: 83 BPM
EKG DIAGNOSIS: NORMAL
EKG DIAGNOSIS: NORMAL
EKG P AXIS: 57 DEGREES
EKG P AXIS: 61 DEGREES
EKG P-R INTERVAL: 146 MS
EKG P-R INTERVAL: 158 MS
EKG Q-T INTERVAL: 370 MS
EKG Q-T INTERVAL: 398 MS
EKG QRS DURATION: 94 MS
EKG QRS DURATION: 96 MS
EKG QTC CALCULATION (BAZETT): 434 MS
EKG QTC CALCULATION (BAZETT): 447 MS
EKG R AXIS: 37 DEGREES
EKG R AXIS: 40 DEGREES
EKG T AXIS: 73 DEGREES
EKG T AXIS: 85 DEGREES
EKG VENTRICULAR RATE: 76 BPM
EKG VENTRICULAR RATE: 83 BPM

## 2024-09-27 ENCOUNTER — TELEPHONE (OUTPATIENT)
Age: 54
End: 2024-09-27

## 2024-09-30 ENCOUNTER — TELEPHONE (OUTPATIENT)
Age: 54
End: 2024-09-30

## 2024-09-30 NOTE — TELEPHONE ENCOUNTER
RE:Nurtec  Key: [id number]    Requested medication has been approved 10/27/20247-10/27/2025. Letter in media    Nurse notified

## 2024-09-30 NOTE — TELEPHONE ENCOUNTER
Patient requesting a call regarding her recent she has had migraines on anf off the last 3 days.    Bisi would also like to know should she be taking still the medication Imitrex?

## 2024-10-07 NOTE — TELEPHONE ENCOUNTER
Called pt.  verified. Pt states that she has been having headache and migraine. Pt states that she has been taking her nurtec as prescribe but has not been doing the sumatriptan. Pt states that she will call her pharmacy regarding getting her sumatriptan filled. Pt states that she has an appt with her PCP on 10/10/24 regarding her headache.

## 2024-10-10 ENCOUNTER — OFFICE VISIT (OUTPATIENT)
Age: 54
End: 2024-10-10
Payer: COMMERCIAL

## 2024-10-10 ENCOUNTER — LAB (OUTPATIENT)
Age: 54
End: 2024-10-10
Payer: COMMERCIAL

## 2024-10-10 VITALS
TEMPERATURE: 98.4 F | HEIGHT: 66 IN | OXYGEN SATURATION: 97 % | HEART RATE: 91 BPM | BODY MASS INDEX: 43.71 KG/M2 | RESPIRATION RATE: 16 BRPM | WEIGHT: 272 LBS | DIASTOLIC BLOOD PRESSURE: 70 MMHG | SYSTOLIC BLOOD PRESSURE: 100 MMHG

## 2024-10-10 DIAGNOSIS — M79.89 LEG SWELLING: ICD-10-CM

## 2024-10-10 DIAGNOSIS — M79.89 LEG SWELLING: Primary | ICD-10-CM

## 2024-10-10 PROCEDURE — 99214 OFFICE O/P EST MOD 30 MIN: CPT | Performed by: FAMILY MEDICINE

## 2024-10-10 PROCEDURE — 90732 PPSV23 VACC 2 YRS+ SUBQ/IM: CPT | Performed by: FAMILY MEDICINE

## 2024-10-10 PROCEDURE — 90661 CCIIV3 VAC ABX FR 0.5 ML IM: CPT | Performed by: FAMILY MEDICINE

## 2024-10-10 SDOH — ECONOMIC STABILITY: FOOD INSECURITY: WITHIN THE PAST 12 MONTHS, YOU WORRIED THAT YOUR FOOD WOULD RUN OUT BEFORE YOU GOT MONEY TO BUY MORE.: NEVER TRUE

## 2024-10-10 SDOH — ECONOMIC STABILITY: INCOME INSECURITY: HOW HARD IS IT FOR YOU TO PAY FOR THE VERY BASICS LIKE FOOD, HOUSING, MEDICAL CARE, AND HEATING?: NOT HARD AT ALL

## 2024-10-10 SDOH — ECONOMIC STABILITY: FOOD INSECURITY: WITHIN THE PAST 12 MONTHS, THE FOOD YOU BOUGHT JUST DIDN'T LAST AND YOU DIDN'T HAVE MONEY TO GET MORE.: NEVER TRUE

## 2024-10-10 ASSESSMENT — PATIENT HEALTH QUESTIONNAIRE - PHQ9
SUM OF ALL RESPONSES TO PHQ QUESTIONS 1-9: 9
6. FEELING BAD ABOUT YOURSELF - OR THAT YOU ARE A FAILURE OR HAVE LET YOURSELF OR YOUR FAMILY DOWN: NEARLY EVERY DAY
8. MOVING OR SPEAKING SO SLOWLY THAT OTHER PEOPLE COULD HAVE NOTICED. OR THE OPPOSITE, BEING SO FIGETY OR RESTLESS THAT YOU HAVE BEEN MOVING AROUND A LOT MORE THAN USUAL: NOT AT ALL
1. LITTLE INTEREST OR PLEASURE IN DOING THINGS: NOT AT ALL
10. IF YOU CHECKED OFF ANY PROBLEMS, HOW DIFFICULT HAVE THESE PROBLEMS MADE IT FOR YOU TO DO YOUR WORK, TAKE CARE OF THINGS AT HOME, OR GET ALONG WITH OTHER PEOPLE: EXTREMELY DIFFICULT
7. TROUBLE CONCENTRATING ON THINGS, SUCH AS READING THE NEWSPAPER OR WATCHING TELEVISION: SEVERAL DAYS
3. TROUBLE FALLING OR STAYING ASLEEP: NOT AT ALL
SUM OF ALL RESPONSES TO PHQ QUESTIONS 1-9: 7
SUM OF ALL RESPONSES TO PHQ QUESTIONS 1-9: 9
9. THOUGHTS THAT YOU WOULD BE BETTER OFF DEAD, OR OF HURTING YOURSELF: MORE THAN HALF THE DAYS
SUM OF ALL RESPONSES TO PHQ9 QUESTIONS 1 & 2: 2
2. FEELING DOWN, DEPRESSED OR HOPELESS: MORE THAN HALF THE DAYS
SUM OF ALL RESPONSES TO PHQ QUESTIONS 1-9: 9
5. POOR APPETITE OR OVEREATING: NOT AT ALL

## 2024-10-10 ASSESSMENT — COLUMBIA-SUICIDE SEVERITY RATING SCALE - C-SSRS
7. DID THIS OCCUR IN THE LAST THREE MONTHS: NO
2. HAVE YOU ACTUALLY HAD ANY THOUGHTS OF KILLING YOURSELF?: YES
6. HAVE YOU EVER DONE ANYTHING, STARTED TO DO ANYTHING, OR PREPARED TO DO ANYTHING TO END YOUR LIFE?: YES
3. HAVE YOU BEEN THINKING ABOUT HOW YOU MIGHT KILL YOURSELF?: YES
5. HAVE YOU STARTED TO WORK OUT OR WORKED OUT THE DETAILS OF HOW TO KILL YOURSELF? DO YOU INTEND TO CARRY OUT THIS PLAN?: NO
1. WITHIN THE PAST MONTH, HAVE YOU WISHED YOU WERE DEAD OR WISHED YOU COULD GO TO SLEEP AND NOT WAKE UP?: YES
4. HAVE YOU HAD THESE THOUGHTS AND HAD SOME INTENTION OF ACTING ON THEM?: NO

## 2024-10-10 NOTE — PROGRESS NOTES
SUBJECTIVE:  Bisi Ahmadi is a 54 y.o. female who complains of headaches for one month(s). Description of pain: throbbing pain. Duration of individual headaches: across her head , frequency . Associated symptoms: . Pain relief: . Precipitating factors: . She  a history of recent head injury.   Prior neurological history: negative for .  Neurologic Review of Systems - .    Scheduled Meds:  Continuous Infusions:  PRN Meds:      OBJECTIVE:  Appearance: alert, well appearing, and in no distress.  Neurological Exam: alert, oriented, normal speech, no focal findings or movement disorder noted.    ASSESSMENT:  migraine - common.    PLAN:  Recommendations: lie in darkened room and apply cold packs prn for pain, side effect profile discussed in detail, asked to keep headache diary, and patient reassured that neurodiagnostic workup not indicated from benign H & P.  See orders for this visit as documented in the electronic medical record.      1. Leg swelling    - Hemoglobin A1C; Future  - TSH; Future  - Comprehensive Metabolic Panel; Future  - Influenza, FLUCELVAX Trivalent, (age 6 mo+) IM, Preservative Free, 0.5mL  - Pneumococcal, PPSV23, PNEUMOVAX 23, (age 2 yrs+), SC/IM

## 2024-10-10 NOTE — PROGRESS NOTES
Identified pt with two pt identifiers(name and )    Chief Complaint   Patient presents with    Headache     Headaches that started 3 weeks ago that come and go    Cough     Coughing that started several days ago with pain in chest when breathing  Sore throat with ear fullness that comes and goes        Health Maintenance Due   Topic    Hepatitis C screen     Hepatitis B vaccine (1 of 3 - 19+ 3-dose series)    Cervical cancer screen     Diabetes screen     Lipids     Breast cancer screen     Shingles vaccine (1 of 2)    Depression Monitoring     Flu vaccine (1)    COVID-19 Vaccine ( season)       Wt Readings from Last 3 Encounters:   10/10/24 123.4 kg (272 lb)   24 108.9 kg (240 lb)   23 115.1 kg (253 lb 11.2 oz)     Temp Readings from Last 3 Encounters:   10/10/24 98.4 °F (36.9 °C) (Temporal)   24 (!) 96 °F (35.6 °C)     BP Readings from Last 3 Encounters:   10/10/24 100/70   24 126/60   24 120/70     Pulse Readings from Last 3 Encounters:   10/10/24 91   24 78   24 87           Depression Screening:  :         10/10/2024     3:12 PM 2023     8:00 AM   PHQ-9 Questionaire   Little interest or pleasure in doing things 0 0   Feeling down, depressed, or hopeless 2 0   Trouble falling or staying asleep, or sleeping too much 0    Feeling tired or having little energy 1    Poor appetite or overeating 0    Feeling bad about yourself - or that you are a failure or have let yourself or your family down 3    Trouble concentrating on things, such as reading the newspaper or watching television 1    Moving or speaking so slowly that other people could have noticed. Or the opposite - being so fidgety or restless that you have been moving around a lot more than usual 0    Thoughts that you would be better off dead, or of hurting yourself in some way 2     PHQ-9 Total Score 9 0   If you checked off any problems, how difficult have these problems made it for you to do your
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
## 2024-11-19 ENCOUNTER — OFFICE VISIT (OUTPATIENT)
Age: 54
End: 2024-11-19
Payer: COMMERCIAL

## 2024-11-19 VITALS
HEART RATE: 87 BPM | SYSTOLIC BLOOD PRESSURE: 130 MMHG | OXYGEN SATURATION: 98 % | TEMPERATURE: 95.3 F | BODY MASS INDEX: 43.9 KG/M2 | HEIGHT: 66 IN | DIASTOLIC BLOOD PRESSURE: 70 MMHG

## 2024-11-19 DIAGNOSIS — G43.E09 CHRONIC MIGRAINE WITH AURA WITHOUT STATUS MIGRAINOSUS, NOT INTRACTABLE: Primary | ICD-10-CM

## 2024-11-19 PROCEDURE — 99214 OFFICE O/P EST MOD 30 MIN: CPT | Performed by: PSYCHIATRY & NEUROLOGY

## 2024-11-19 RX ORDER — GALCANEZUMAB 120 MG/ML
120 INJECTION, SOLUTION SUBCUTANEOUS
Qty: 1 ADJUSTABLE DOSE PRE-FILLED PEN SYRINGE | Refills: 5 | Status: SHIPPED | OUTPATIENT
Start: 2024-11-19

## 2024-11-24 RX ORDER — SUMATRIPTAN SUCCINATE 100 MG/1
TABLET ORAL
Qty: 9 TABLET | Refills: 3 | Status: SHIPPED | OUTPATIENT
Start: 2024-11-24

## 2024-12-06 ENCOUNTER — TELEPHONE (OUTPATIENT)
Age: 54
End: 2024-12-06

## 2024-12-06 NOTE — TELEPHONE ENCOUNTER
RE:Emgality      Case submitted via EPIC has been approved       Approved Drug: Emgality Strength: 120 mg/mL Quantity/Days: 1 mL/30 days Duration: 6 months 11/22/2024 - 05/22/2025    Letter in media  Nurse notified

## 2025-02-03 ENCOUNTER — LAB (OUTPATIENT)
Age: 55
End: 2025-02-03
Payer: COMMERCIAL

## 2025-02-03 ENCOUNTER — COMMUNITY OUTREACH (OUTPATIENT)
Age: 55
End: 2025-02-03

## 2025-02-03 ENCOUNTER — OFFICE VISIT (OUTPATIENT)
Age: 55
End: 2025-02-03
Payer: COMMERCIAL

## 2025-02-03 VITALS
WEIGHT: 271 LBS | HEIGHT: 66 IN | DIASTOLIC BLOOD PRESSURE: 84 MMHG | HEART RATE: 92 BPM | BODY MASS INDEX: 43.55 KG/M2 | OXYGEN SATURATION: 92 % | RESPIRATION RATE: 16 BRPM | TEMPERATURE: 98.1 F | SYSTOLIC BLOOD PRESSURE: 122 MMHG

## 2025-02-03 DIAGNOSIS — M79.674 PAIN OF RIGHT GREAT TOE: ICD-10-CM

## 2025-02-03 DIAGNOSIS — M79.674 PAIN OF RIGHT GREAT TOE: Primary | ICD-10-CM

## 2025-02-03 PROCEDURE — 99214 OFFICE O/P EST MOD 30 MIN: CPT | Performed by: FAMILY MEDICINE

## 2025-02-03 SDOH — ECONOMIC STABILITY: FOOD INSECURITY: WITHIN THE PAST 12 MONTHS, YOU WORRIED THAT YOUR FOOD WOULD RUN OUT BEFORE YOU GOT MONEY TO BUY MORE.: NEVER TRUE

## 2025-02-03 SDOH — ECONOMIC STABILITY: FOOD INSECURITY: WITHIN THE PAST 12 MONTHS, THE FOOD YOU BOUGHT JUST DIDN'T LAST AND YOU DIDN'T HAVE MONEY TO GET MORE.: NEVER TRUE

## 2025-02-03 ASSESSMENT — PATIENT HEALTH QUESTIONNAIRE - PHQ9
SUM OF ALL RESPONSES TO PHQ QUESTIONS 1-9: 13
2. FEELING DOWN, DEPRESSED OR HOPELESS: NEARLY EVERY DAY
4. FEELING TIRED OR HAVING LITTLE ENERGY: NEARLY EVERY DAY
1. LITTLE INTEREST OR PLEASURE IN DOING THINGS: NEARLY EVERY DAY
SUM OF ALL RESPONSES TO PHQ QUESTIONS 1-9: 13
9. THOUGHTS THAT YOU WOULD BE BETTER OFF DEAD, OR OF HURTING YOURSELF: NEARLY EVERY DAY
3. TROUBLE FALLING OR STAYING ASLEEP: NOT AT ALL
SUM OF ALL RESPONSES TO PHQ QUESTIONS 1-9: 13
7. TROUBLE CONCENTRATING ON THINGS, SUCH AS READING THE NEWSPAPER OR WATCHING TELEVISION: SEVERAL DAYS
SUM OF ALL RESPONSES TO PHQ QUESTIONS 1-9: 10
SUM OF ALL RESPONSES TO PHQ9 QUESTIONS 1 & 2: 6
8. MOVING OR SPEAKING SO SLOWLY THAT OTHER PEOPLE COULD HAVE NOTICED. OR THE OPPOSITE, BEING SO FIGETY OR RESTLESS THAT YOU HAVE BEEN MOVING AROUND A LOT MORE THAN USUAL: NOT AT ALL
6. FEELING BAD ABOUT YOURSELF - OR THAT YOU ARE A FAILURE OR HAVE LET YOURSELF OR YOUR FAMILY DOWN: NOT AT ALL
10. IF YOU CHECKED OFF ANY PROBLEMS, HOW DIFFICULT HAVE THESE PROBLEMS MADE IT FOR YOU TO DO YOUR WORK, TAKE CARE OF THINGS AT HOME, OR GET ALONG WITH OTHER PEOPLE: VERY DIFFICULT
5. POOR APPETITE OR OVEREATING: NOT AT ALL

## 2025-02-03 ASSESSMENT — COLUMBIA-SUICIDE SEVERITY RATING SCALE - C-SSRS
4. HAVE YOU HAD THESE THOUGHTS AND HAD SOME INTENTION OF ACTING ON THEM?: NO
5. HAVE YOU STARTED TO WORK OUT OR WORKED OUT THE DETAILS OF HOW TO KILL YOURSELF? DO YOU INTEND TO CARRY OUT THIS PLAN?: NO
7. DID THIS OCCUR IN THE LAST THREE MONTHS: NO
1. WITHIN THE PAST MONTH, HAVE YOU WISHED YOU WERE DEAD OR WISHED YOU COULD GO TO SLEEP AND NOT WAKE UP?: YES
3. HAVE YOU BEEN THINKING ABOUT HOW YOU MIGHT KILL YOURSELF?: YES
2. HAVE YOU ACTUALLY HAD ANY THOUGHTS OF KILLING YOURSELF?: YES
6. HAVE YOU EVER DONE ANYTHING, STARTED TO DO ANYTHING, OR PREPARED TO DO ANYTHING TO END YOUR LIFE?: YES

## 2025-02-04 ENCOUNTER — HOSPITAL ENCOUNTER (OUTPATIENT)
Facility: HOSPITAL | Age: 55
Discharge: HOME | End: 2025-02-07
Payer: COMMERCIAL

## 2025-02-04 ENCOUNTER — ANCILLARY ORDERS (OUTPATIENT)
Age: 55
End: 2025-02-04

## 2025-02-04 DIAGNOSIS — M79.674 PAIN OF RIGHT GREAT TOE: Primary | ICD-10-CM

## 2025-02-04 DIAGNOSIS — M79.674 PAIN OF RIGHT GREAT TOE: ICD-10-CM

## 2025-02-04 DIAGNOSIS — S92.901A CLOSED FRACTURE OF RIGHT FOOT, INITIAL ENCOUNTER: ICD-10-CM

## 2025-02-04 LAB
ALBUMIN SERPL-MCNC: 3.8 G/DL (ref 3.5–5)
ALBUMIN/GLOB SERPL: 1.3 (ref 1.1–2.2)
ALP SERPL-CCNC: 113 U/L (ref 45–117)
ALT SERPL-CCNC: 24 U/L (ref 12–78)
ANION GAP SERPL CALC-SCNC: 7 MMOL/L (ref 2–12)
AST SERPL-CCNC: 14 U/L (ref 15–37)
BILIRUB SERPL-MCNC: 0.4 MG/DL (ref 0.2–1)
BUN SERPL-MCNC: 17 MG/DL (ref 6–20)
BUN/CREAT SERPL: 19 (ref 12–20)
CALCIUM SERPL-MCNC: 9.8 MG/DL (ref 8.5–10.1)
CHLORIDE SERPL-SCNC: 103 MMOL/L (ref 97–108)
CO2 SERPL-SCNC: 30 MMOL/L (ref 21–32)
CREAT SERPL-MCNC: 0.91 MG/DL (ref 0.55–1.02)
EST. AVERAGE GLUCOSE BLD GHB EST-MCNC: 117 MG/DL
GLOBULIN SER CALC-MCNC: 2.9 G/DL (ref 2–4)
GLUCOSE SERPL-MCNC: 118 MG/DL (ref 65–100)
HBA1C MFR BLD: 5.7 % (ref 4–5.6)
POTASSIUM SERPL-SCNC: 4.9 MMOL/L (ref 3.5–5.1)
PROT SERPL-MCNC: 6.7 G/DL (ref 6.4–8.2)
SODIUM SERPL-SCNC: 140 MMOL/L (ref 136–145)
URATE SERPL-MCNC: 4.6 MG/DL (ref 2.6–6)

## 2025-02-04 PROCEDURE — 73630 X-RAY EXAM OF FOOT: CPT

## 2025-02-04 ASSESSMENT — ENCOUNTER SYMPTOMS
CHEST TIGHTNESS: 0
NAUSEA: 0
SORE THROAT: 0
SINUS PRESSURE: 0
CONSTIPATION: 0
ANAL BLEEDING: 0
BLOOD IN STOOL: 0
VOMITING: 0
COUGH: 0
ABDOMINAL DISTENTION: 0
BACK PAIN: 0
ABDOMINAL PAIN: 0
SINUS PAIN: 0
SHORTNESS OF BREATH: 0
WHEEZING: 0
RHINORRHEA: 0
DIARRHEA: 0

## 2025-02-04 NOTE — PROGRESS NOTES
Assessment & Plan   1. Pain of right great toe  -     XR FOOT RIGHT (2 VIEWS); Future  -     [facility] - Malcolm Bates MD, Orthopedic Surgery (Foot, Ankle), [place]  -     Hemoglobin A1C; Future  -     Comprehensive Metabolic Panel; Future  -     Uric Acid; Future       No follow-ups on file.       Subjective   Bisi Ahmadi (:  1970) is a 54 y.o. female,Established patient, here for evaluation of the following chief complaint(s):  Toe Pain (patient had surgery on her foot a few years ago and recentley noticed a lump on her big toe on her right foot )      Bisi Ahmadi is a 54 y.o. female presents as a follow up for her annual preventative visit.     Has complaints of toe pain, for the past few weeks, feels like her tow is getting stuck in one position and she is not able to move her toe.     Toe Pain         Review of Systems   Constitutional:  Negative for activity change, appetite change, fatigue and fever.   HENT:  Negative for congestion, postnasal drip, rhinorrhea, sinus pressure, sinus pain, sneezing and sore throat.    Respiratory:  Negative for cough, chest tightness, shortness of breath and wheezing.    Cardiovascular:  Negative for chest pain, palpitations and leg swelling.   Gastrointestinal:  Negative for abdominal distention, abdominal pain, anal bleeding, blood in stool, constipation, diarrhea, nausea and vomiting.   Endocrine: Negative for cold intolerance, heat intolerance, polydipsia, polyphagia and polyuria.   Genitourinary:  Negative for dyspareunia, genital sores, hematuria, menstrual problem, pelvic pain, vaginal bleeding, vaginal discharge and vaginal pain.   Musculoskeletal:  Negative for arthralgias, back pain, gait problem, joint swelling and neck pain.   Skin:  Negative for pallor, rash and wound.   Allergic/Immunologic: Negative for environmental allergies, food allergies and immunocompromised state.   Hematological:  Negative for adenopathy. Does not bruise/bleed easily. 
with other people? 2 3         Significant value        Fall Risk Assessment:  :          No data to display                 Abuse Screening:  :          No data to display                 Coordination of Care Questionnaire:  :     \"Have you been to the ER, urgent care clinic since your last visit?  Hospitalized since your last visit?\"    NO    “Have you seen or consulted any other health care providers outside our system since your last visit?”    NO    Have you had a mammogram?”   NO    No breast cancer screening on file      “Have you had a pap smear?”    NO    No cervical cancer screening on file       “Have you had a colorectal cancer screening such as a colonoscopy/FIT/Cologuard?    NO    Date of last Colonoscopy: 11/8/2018  No cologuard on file  No FIT/FOBT on file   No flexible sigmoidoscopy on file       Click Here for Release of Records Request

## 2025-02-06 ENCOUNTER — TELEPHONE (OUTPATIENT)
Age: 55
End: 2025-02-06

## 2025-02-06 NOTE — TELEPHONE ENCOUNTER
Pt called for her lab results. I relayed them to her and she understood. Pt did want to make sure she did not have any signs of gout. Please advise.

## 2025-02-25 ENCOUNTER — TELEPHONE (OUTPATIENT)
Age: 55
End: 2025-02-25

## 2025-02-25 ENCOUNTER — OFFICE VISIT (OUTPATIENT)
Age: 55
End: 2025-02-25
Payer: COMMERCIAL

## 2025-02-25 DIAGNOSIS — M66.271 SPONTANEOUS RUPTURE OF EXTENSOR TENDON OF RIGHT FOOT: ICD-10-CM

## 2025-02-25 DIAGNOSIS — T84.84XA PAINFUL ORTHOPAEDIC HARDWARE: Primary | ICD-10-CM

## 2025-02-25 PROCEDURE — 99204 OFFICE O/P NEW MOD 45 MIN: CPT | Performed by: ORTHOPAEDIC SURGERY

## 2025-02-25 RX ORDER — ACETAMINOPHEN AND CODEINE PHOSPHATE 300; 15 MG/1; MG/1
1 TABLET ORAL EVERY 8 HOURS PRN
Qty: 21 TABLET | Refills: 0 | Status: SHIPPED | OUTPATIENT
Start: 2025-02-25 | End: 2025-03-04

## 2025-02-25 ASSESSMENT — COLUMBIA-SUICIDE SEVERITY RATING SCALE - C-SSRS
4. HAVE YOU HAD THESE THOUGHTS AND HAD SOME INTENTION OF ACTING ON THEM?: NO
7. DID THIS OCCUR IN THE LAST THREE MONTHS: NO
2. HAVE YOU ACTUALLY HAD ANY THOUGHTS OF KILLING YOURSELF?: YES
1. WITHIN THE PAST MONTH, HAVE YOU WISHED YOU WERE DEAD OR WISHED YOU COULD GO TO SLEEP AND NOT WAKE UP?: YES
3. HAVE YOU BEEN THINKING ABOUT HOW YOU MIGHT KILL YOURSELF?: YES
6. HAVE YOU EVER DONE ANYTHING, STARTED TO DO ANYTHING, OR PREPARED TO DO ANYTHING TO END YOUR LIFE?: YES
5. HAVE YOU STARTED TO WORK OUT OR WORKED OUT THE DETAILS OF HOW TO KILL YOURSELF? DO YOU INTEND TO CARRY OUT THIS PLAN?: NO

## 2025-02-25 ASSESSMENT — PATIENT HEALTH QUESTIONNAIRE - PHQ9
9. THOUGHTS THAT YOU WOULD BE BETTER OFF DEAD, OR OF HURTING YOURSELF: NEARLY EVERY DAY
SUM OF ALL RESPONSES TO PHQ QUESTIONS 1-9: 13
SUM OF ALL RESPONSES TO PHQ9 QUESTIONS 1 & 2: 3
4. FEELING TIRED OR HAVING LITTLE ENERGY: NEARLY EVERY DAY
SUM OF ALL RESPONSES TO PHQ QUESTIONS 1-9: 13
SUM OF ALL RESPONSES TO PHQ QUESTIONS 1-9: 10
10. IF YOU CHECKED OFF ANY PROBLEMS, HOW DIFFICULT HAVE THESE PROBLEMS MADE IT FOR YOU TO DO YOUR WORK, TAKE CARE OF THINGS AT HOME, OR GET ALONG WITH OTHER PEOPLE: EXTREMELY DIFFICULT
2. FEELING DOWN, DEPRESSED OR HOPELESS: NEARLY EVERY DAY
3. TROUBLE FALLING OR STAYING ASLEEP: NOT AT ALL
5. POOR APPETITE OR OVEREATING: SEVERAL DAYS
7. TROUBLE CONCENTRATING ON THINGS, SUCH AS READING THE NEWSPAPER OR WATCHING TELEVISION: NOT AT ALL
6. FEELING BAD ABOUT YOURSELF - OR THAT YOU ARE A FAILURE OR HAVE LET YOURSELF OR YOUR FAMILY DOWN: NEARLY EVERY DAY
1. LITTLE INTEREST OR PLEASURE IN DOING THINGS: NOT AT ALL
SUM OF ALL RESPONSES TO PHQ QUESTIONS 1-9: 13
8. MOVING OR SPEAKING SO SLOWLY THAT OTHER PEOPLE COULD HAVE NOTICED. OR THE OPPOSITE, BEING SO FIGETY OR RESTLESS THAT YOU HAVE BEEN MOVING AROUND A LOT MORE THAN USUAL: NOT AT ALL

## 2025-02-25 NOTE — TELEPHONE ENCOUNTER
[facility] called stating the patient is a new patient with their pharmacy however, the patients insurance states the patient is taking Clonazepam and there may be an interaction between this medication and RX by Dr. Bates 2/25/25   acetaminophen-Codeine (TYLENOL #2) 300-15 MG per tablet . Thu can be reached at [phone].     Possible drug interaction

## 2025-02-25 NOTE — PROGRESS NOTES
Identified pt with two pt identifiers (name and ). Reviewed chart in preparation for visit and have obtained necessary documentation.    Bisi Ahmadi is a 54 y.o. female New Patient (Right Foot Pain )  .    There were no vitals filed for this visit.       1. Have you been to the ER, urgent care clinic since your last visit?  Hospitalized since your last visit?  no     2. Have you seen or consulted any other health care providers outside of the [facility] since your last visit?  Include any pap smears or colon screening.  no

## 2025-02-27 NOTE — PROGRESS NOTES
2/27/2025      CC: Right great toe pain    HPI:      This is a 54 y.o. year old female who has a several month history of right great toe pain, she has a history of a fusion, this did not significantly resolve the symptoms that she had of arthritic change within her toe, she has had significantly increased pain in the last 3 months.  She also cannot raise her great toe.      PMH:  Past Medical History:   Diagnosis Date    Back pain     Borderline personality disorder (HCC)     Dysthymic disorder     Headache     Insomnia, unspecified     Migraine     Post traumatic stress disorder (PTSD)     Psychosis (HCC)     PUD (peptic ulcer disease)     Schizoaffective disorder (HCC)        PSxHx:  Past Surgical History:   Procedure Laterality Date    CHOLECYSTECTOMY  2012    COLONOSCOPY N/A 11/8/2018    COLONOSCOPY performed by Giovanny Yin MD at [facility] ENDOSCOPY    GI  1971    Ulcer surgery of stomach       Meds:    Current Outpatient Medications:     acetaminophen-Codeine (TYLENOL #2) 300-15 MG per tablet, Take 1 tablet by mouth every 8 hours as needed for Pain for up to 7 days. Max Daily Amount: 3 tablets, Disp: 21 tablet, Rfl: 0    SUMAtriptan (IMITREX) 100 MG tablet, TAKE ONE TABLET BY MOUTH AT ONSET OF HEADACHE -- MAY REPEAT 1 TABLET AFTER 2 HOURS -- MAX DAILY AMOUNT IS 2 TABLETS, Disp: 9 tablet, Rfl: 3    promethazine (PHENERGAN) 50 MG tablet, TAKE ONE TABLET BY MOUTH EVERY 6 TO 8 HOURS AS NEEDED FOR NAUSEA AND VOMITING, Disp: 30 tablet, Rfl: 4    clonazePAM (KLONOPIN) 1 MG tablet, 1 tablet 3 times daily., Disp: , Rfl:     cloZAPine (CLOZARIL) 100 MG tablet, Take 3 tablets by mouth, Disp: , Rfl:     divalproex (DEPAKOTE ER) 500 MG extended release tablet, Take 1 tablet by mouth at bedtime 2 tablets at night, Disp: , Rfl:     DULoxetine (CYMBALTA) 60 MG extended release capsule, Take 1 capsule by mouth daily, Disp: , Rfl:     hydrOXYzine HCl (ATARAX) 50 MG tablet, Take 1 tablet by mouth 3 times daily as needed, Disp:

## 2025-02-28 ENCOUNTER — TELEPHONE (OUTPATIENT)
Age: 55
End: 2025-02-28

## 2025-02-28 ENCOUNTER — PREP FOR PROCEDURE (OUTPATIENT)
Age: 55
End: 2025-02-28

## 2025-02-28 DIAGNOSIS — M66.271 SPONTANEOUS RUPTURE OF EXTENSOR TENDON OF RIGHT FOOT: ICD-10-CM

## 2025-02-28 DIAGNOSIS — T84.84XA PAINFUL ORTHOPAEDIC HARDWARE: ICD-10-CM

## 2025-02-28 NOTE — H&P
Standing     Number of Occurrences:   1    EKG 12 Lead     Standing Status:   Standing     Number of Occurrences:   1     Order Specific Question:   Reason for Exam?     Answer:   Pre-op        Consultations recommended:     None      The purpose of this visit is for preoperative history and physical and does not imply medical clearance for surgical procedure.  Additional clearance from Anesthesia, The Surgeon, and/or Specialists may be required based on the findings from this examination.     Each medication has been reviewed with the patient. When appropriate, medication education was provided to the patient. Certain medications may require specific recommendations from the prescribing provider in order to ensure positive health outcomes prior to, during, and after their procedure/surgery.     Extensive preoperative education was provided to the patient during their visit, including answering of all patient questions.      All results have been reviewed by the appropriate provider and adjustments to the patient's preoperative plan of care may occur to best fit the patient's needs.       Ten Rodriguez, MSN, APRN, FNP-C  Nurse Practitioner for Pre-Admission Testing  [facility]

## 2025-02-28 NOTE — TELEPHONE ENCOUNTER
Contacted patient to schedule surgery. Scheduled for 3/18/25. Advised patient that clearances from PCP would be required, and would need to be received no less than 2 business days before surgery. Patient advised to contact the office(s) to make pre op appts as soon as possible. Patient verbalized understanding and was encouraged to contact our office with any questions or concerns regarding upcoming surgery or required clearances. Clearance letters GIVEN TO PATIENT IN OFFICE. PATIENT ADVISED SHE WILL NEED TO CALL PCP TO SCHEDULE APPOINTMENT FOR SURGERY CLEARANCE PATIENT AGREED.PATIENT HAS NO OTHER QUESTIONS OR CONCERNS AT THIS TIME.       ----- Message from Dr. Malcolm Bates DO sent at 2/25/2025  4:13 PM EST -----  Diagnosis: broken hardware, right great toe, tendon rupture great toe T84.84xa, M66.271a  Procedure: hardware removal, revision first metatarsophalangeal joint fusion, possible EHL tendon transfer  CPT: 22809, 10356  Operative minutes: 80  Disposition postop: home  Location: [facility]  PAT: yes-IN PERSON   Class: no  Special Equipment: carroll 1st MTP fusion plate, vitos, mini fragment locking set, c arm, ortho minor set  Staffing: Assistant/retractor chaney yes  Anesthesia: general  Surgical Index 2

## 2025-02-28 NOTE — TELEPHONE ENCOUNTER
----- Message from ZAHIDA HEWITT MA sent at 2/27/2025  2:53 PM EST -----  Regarding: FW: [facility] Appointment Request    ----- Message -----  From: Tejal Heredia  Sent: 2/27/2025   2:19 PM EST  To: Ilya Spain Ass Clinical Staff  Subject: ECC Appointment Request                          E[facility] Appointment Request    Patient needs appointment for E[facility] Appointment Type: Pre-Op Visit.    Patient Requested Dates(s): March 4,5,or 11.  Patient Requested Time: Afternoon Appointment around 2:30 to 4:30 PM  Provider Name: Dalia Winters MD    Reason for Appointment Request: Established Patient - Available appointments did not meet patient need. The patient will be having a foot surgery on 3/18/2025 and  the patient needs a pre-op visit prior to the appointment.    --------------------------------------------------------------------------------------------------------------------------    Relationship to Patient: Self     Call Back Information: OK to leave message on voicemail  Preferred Call Back Number: Phone: [phone]

## 2025-03-01 DIAGNOSIS — G43.E09 CHRONIC MIGRAINE WITH AURA WITHOUT STATUS MIGRAINOSUS, NOT INTRACTABLE: ICD-10-CM

## 2025-03-03 ENCOUNTER — TELEPHONE (OUTPATIENT)
Age: 55
End: 2025-03-03

## 2025-03-03 RX ORDER — PROMETHAZINE HCL 50 MG
TABLET ORAL
Qty: 30 TABLET | Refills: 4 | Status: SHIPPED | OUTPATIENT
Start: 2025-03-03

## 2025-03-03 NOTE — TELEPHONE ENCOUNTER
Patient is requesting a refill of RX   acetaminophen-Codeine (TYLENOL #2) 300-15 MG per tablet  be sent to her preferred pharmacy of [address] - P [phone] - F [phone] [[id number]]. Patient can be reached at [phone].     Refill request to pharmacy on file.

## 2025-03-04 NOTE — TELEPHONE ENCOUNTER
Patient called to check on the prescription for her medication, informed patient the it may take 48-72 hours from the previous phone call from yesterday. Patient was informed that it was only going to be 24 hours for the medication and was asked if patient contacted the pharmacy and they haven't received the script.

## 2025-03-04 NOTE — TELEPHONE ENCOUNTER
Identified pt with two pt identifiers (name and ). Reviewed chart in preparation for visit and have obtained necessary documentation.  Spoke with pt verified pharmacy and medication pt would like to have refill sent to preferred pharmacy below

## 2025-03-05 ENCOUNTER — TELEPHONE (OUTPATIENT)
Age: 55
End: 2025-03-05

## 2025-03-05 ENCOUNTER — HOSPITAL ENCOUNTER (OUTPATIENT)
Facility: HOSPITAL | Age: 55
Discharge: HOME | End: 2025-03-08
Payer: COMMERCIAL

## 2025-03-05 VITALS
HEIGHT: 66 IN | OXYGEN SATURATION: 95 % | BODY MASS INDEX: 44.57 KG/M2 | DIASTOLIC BLOOD PRESSURE: 73 MMHG | WEIGHT: 277.34 LBS | TEMPERATURE: 97.7 F | HEART RATE: 93 BPM | RESPIRATION RATE: 16 BRPM | SYSTOLIC BLOOD PRESSURE: 132 MMHG

## 2025-03-05 DIAGNOSIS — M66.271 SPONTANEOUS RUPTURE OF EXTENSOR TENDON OF RIGHT FOOT: Primary | ICD-10-CM

## 2025-03-05 DIAGNOSIS — T84.84XA PAINFUL ORTHOPAEDIC HARDWARE: ICD-10-CM

## 2025-03-05 LAB
ALBUMIN SERPL-MCNC: 3.7 G/DL (ref 3.5–5)
ALBUMIN/GLOB SERPL: 1.2 (ref 1.1–2.2)
ALP SERPL-CCNC: 138 U/L (ref 45–117)
ALT SERPL-CCNC: 32 U/L (ref 12–78)
ANION GAP SERPL CALC-SCNC: 7 MMOL/L (ref 2–12)
APPEARANCE UR: ABNORMAL
AST SERPL-CCNC: 25 U/L (ref 15–37)
BACTERIA URNS QL MICRO: ABNORMAL /HPF
BASOPHILS # BLD: 0.03 K/UL (ref 0–0.1)
BASOPHILS NFR BLD: 0.4 % (ref 0–1)
BILIRUB SERPL-MCNC: 0.4 MG/DL (ref 0.2–1)
BILIRUB UR QL: NEGATIVE
BUN SERPL-MCNC: 24 MG/DL (ref 6–20)
BUN/CREAT SERPL: 22 (ref 12–20)
CALCIUM SERPL-MCNC: 9.6 MG/DL (ref 8.5–10.1)
CHLORIDE SERPL-SCNC: 104 MMOL/L (ref 97–108)
CO2 SERPL-SCNC: 26 MMOL/L (ref 21–32)
COLOR UR: ABNORMAL
CREAT SERPL-MCNC: 1.1 MG/DL (ref 0.55–1.02)
DIFFERENTIAL METHOD BLD: ABNORMAL
EKG ATRIAL RATE: 89 BPM
EKG DIAGNOSIS: NORMAL
EKG P AXIS: 64 DEGREES
EKG P-R INTERVAL: 140 MS
EKG Q-T INTERVAL: 378 MS
EKG QRS DURATION: 88 MS
EKG QTC CALCULATION (BAZETT): 459 MS
EKG R AXIS: 57 DEGREES
EKG T AXIS: 76 DEGREES
EKG VENTRICULAR RATE: 89 BPM
EOSINOPHIL # BLD: 0 K/UL (ref 0–0.4)
EOSINOPHIL NFR BLD: 0 % (ref 0–7)
EPITH CASTS URNS QL MICRO: ABNORMAL /LPF
ERYTHROCYTE [DISTWIDTH] IN BLOOD BY AUTOMATED COUNT: 13 % (ref 11.5–14.5)
EST. AVERAGE GLUCOSE BLD GHB EST-MCNC: 114 MG/DL
GLOBULIN SER CALC-MCNC: 3 G/DL (ref 2–4)
GLUCOSE SERPL-MCNC: 116 MG/DL (ref 65–100)
GLUCOSE UR STRIP.AUTO-MCNC: NEGATIVE MG/DL
HBA1C MFR BLD: 5.6 % (ref 4–5.6)
HCT VFR BLD AUTO: 40.4 % (ref 35–47)
HGB BLD-MCNC: 13 G/DL (ref 11.5–16)
HGB UR QL STRIP: NEGATIVE
IMM GRANULOCYTES # BLD AUTO: 0.05 K/UL (ref 0–0.04)
IMM GRANULOCYTES NFR BLD AUTO: 0.6 % (ref 0–0.5)
KETONES UR QL STRIP.AUTO: NEGATIVE MG/DL
LEUKOCYTE ESTERASE UR QL STRIP.AUTO: ABNORMAL
LYMPHOCYTES # BLD: 2 K/UL (ref 0.8–3.5)
LYMPHOCYTES NFR BLD: 24.3 % (ref 12–49)
MCH RBC QN AUTO: 30.7 PG (ref 26–34)
MCHC RBC AUTO-ENTMCNC: 32.2 G/DL (ref 30–36.5)
MCV RBC AUTO: 95.3 FL (ref 80–99)
MONOCYTES # BLD: 0.76 K/UL (ref 0–1)
MONOCYTES NFR BLD: 9.2 % (ref 5–13)
NEUTS SEG # BLD: 5.4 K/UL (ref 1.8–8)
NEUTS SEG NFR BLD: 65.5 % (ref 32–75)
NITRITE UR QL STRIP.AUTO: NEGATIVE
NRBC # BLD: 0 K/UL (ref 0–0.01)
NRBC BLD-RTO: 0 PER 100 WBC
PH UR STRIP: 5.5 (ref 5–8)
PLATELET # BLD AUTO: 233 K/UL (ref 150–400)
PMV BLD AUTO: 10.2 FL (ref 8.9–12.9)
POTASSIUM SERPL-SCNC: 4.8 MMOL/L (ref 3.5–5.1)
PROT SERPL-MCNC: 6.7 G/DL (ref 6.4–8.2)
PROT UR STRIP-MCNC: NEGATIVE MG/DL
RBC # BLD AUTO: 4.24 M/UL (ref 3.8–5.2)
RBC #/AREA URNS HPF: ABNORMAL /HPF (ref 0–5)
SODIUM SERPL-SCNC: 137 MMOL/L (ref 136–145)
SP GR UR REFRACTOMETRY: 1.03 (ref 1–1.03)
URINE CULTURE IF INDICATED: ABNORMAL
UROBILINOGEN UR QL STRIP.AUTO: 0.2 EU/DL (ref 0.2–1)
WBC # BLD AUTO: 8.2 K/UL (ref 3.6–11)
WBC URNS QL MICRO: ABNORMAL /HPF (ref 0–4)

## 2025-03-05 PROCEDURE — 85025 COMPLETE CBC W/AUTO DIFF WBC: CPT

## 2025-03-05 PROCEDURE — 81001 URINALYSIS AUTO W/SCOPE: CPT

## 2025-03-05 PROCEDURE — 87086 URINE CULTURE/COLONY COUNT: CPT

## 2025-03-05 PROCEDURE — 93005 ELECTROCARDIOGRAM TRACING: CPT | Performed by: NURSE PRACTITIONER

## 2025-03-05 PROCEDURE — 36415 COLL VENOUS BLD VENIPUNCTURE: CPT

## 2025-03-05 PROCEDURE — 80053 COMPREHEN METABOLIC PANEL: CPT

## 2025-03-05 PROCEDURE — 83036 HEMOGLOBIN GLYCOSYLATED A1C: CPT

## 2025-03-05 RX ORDER — ACETAMINOPHEN AND CODEINE PHOSPHATE 300; 15 MG/1; MG/1
1 TABLET ORAL EVERY 4 HOURS PRN
Qty: 28 TABLET | Refills: 0 | Status: SHIPPED | OUTPATIENT
Start: 2025-03-05 | End: 2025-03-12

## 2025-03-05 ASSESSMENT — ENCOUNTER SYMPTOMS
RHINORRHEA: 0
SORE THROAT: 0
SHORTNESS OF BREATH: 0
COUGH: 0
SINUS PRESSURE: 0
EYE PAIN: 0
EYE REDNESS: 0
TROUBLE SWALLOWING: 0
ABDOMINAL PAIN: 0
NAUSEA: 0
VOICE CHANGE: 0
CHEST TIGHTNESS: 0
ABDOMINAL DISTENTION: 0
WHEEZING: 0
FACIAL SWELLING: 0
APNEA: 0
COLOR CHANGE: 0
STRIDOR: 0
PHOTOPHOBIA: 0
SINUS PAIN: 0
CHOKING: 0

## 2025-03-05 ASSESSMENT — PAIN DESCRIPTION - LOCATION: LOCATION: FOOT

## 2025-03-05 ASSESSMENT — PAIN SCALES - GENERAL: PAINLEVEL_OUTOF10: 7

## 2025-03-05 ASSESSMENT — PAIN DESCRIPTION - PAIN TYPE: TYPE: CHRONIC PAIN

## 2025-03-05 ASSESSMENT — PAIN DESCRIPTION - DESCRIPTORS: DESCRIPTORS: ACHING

## 2025-03-05 ASSESSMENT — PAIN DESCRIPTION - ORIENTATION: ORIENTATION: RIGHT

## 2025-03-05 NOTE — PERIOP NOTE
1045: Call to [facility] to request last visit note to be faxed to PAT @[phone]    1048: Call to Dr. Bates's office,message left for surgery scheduler-  no laterality on posting.       Last visit note from [facility] 2/16/25 requested by SHAKIR Rodriguez NP. Received and on chart.   
No

## 2025-03-05 NOTE — DISCHARGE INSTRUCTIONS
[facility]                   [address] [place] [address]   MAIN OR                               [phone]   MAIN PRE OP                       [phone]                                                                                AMBULATORY PRE OP       [phone]  PRE-ADMISSION TESTING [phone]     Surgery Date:  Tuesday, March 18, 2025       Is surgery arrival time given by surgeon?  YES  NO  If “NO”, [facility] staff will call you between 3 and 7pm the day before your surgery with your arrival time.   (If your surgery is on a Monday, we will call you the Friday before.)    Call [phone] after 7pm Monday-Friday if you did not receive this call.    INSTRUCTIONS BEFORE YOUR SURGERY   When You  Arrive Free  parking is available from 7:00 AM - 5:00 PM.  Arrive at the 2nd Floor Admitting Desk on the day of your surgery.  Have your insurance card, photo ID, and any copayment (if needed).   Food   and   Drink No food or drink (gum, mints, coffee, juice, etc) after midnight the night before surgery.   No alcohol (beer, wine, liquor) or marijuana 24 hours before and after surgery.    You may drink WATER ONLY up until 2 hours prior to your surgery time.   Please do not add anything to your water as this may result in your surgery being postponed.      Pre- operative  Medication Instructions   MEDICATIONS TO TAKE THE MORNING OF SURGERY WITH A SIP OF WATER:     Cymbalta  Klonopin  Promethazine, if needed  Hydroxyzine, if needed     DO NOT take Sumatriptan on 3/18 or 3/17      SPECIAL INSTRUCTIONS:    Tylenol may be taken as needed.   Medications  To  STOP      7 days before surgery Non-Steroidal anti-inflammatory Drugs (NSAID's): for example, Ibuprofen (Advil, Motrin), Naproxen (Aleve).  Aspirin, if taking for pain/ prevention.   Herbal supplements, vitamins, and fish oil.  Other:     Blood  Thinners If you take  Aspirin, Plavix, Coumadin, or any

## 2025-03-05 NOTE — TELEPHONE ENCOUNTER
Identified pt with two pt identifiers (name and ). Reviewed chart in preparation for visit and have obtained necessary documentation.  Spoke with pt and informed medication had been filled and sent to preferred pharmacy pt thanked me.      Asked pt where she heard the 24 hour TAT and pt stated she couldn't remember her name. Clarified with pt that it's a 48-72 hour TAT pt stated thank you for clarification.

## 2025-03-05 NOTE — TELEPHONE ENCOUNTER
[facility] called stating that Pt SX posting had no laterality. PAT requests a CB at [phone]. Pt is scheduled for Rt foot SX with Dr. Bates on 3/18.

## 2025-03-06 LAB
BACTERIA SPEC CULT: NORMAL
CC UR VC: NORMAL
SERVICE CMNT-IMP: NORMAL
SERVICE CMNT-IMP: NORMAL

## 2025-03-07 ENCOUNTER — TELEPHONE (OUTPATIENT)
Age: 55
End: 2025-03-07

## 2025-03-07 NOTE — TELEPHONE ENCOUNTER
Identified pt with two pt identifiers (name and ). Reviewed chart in preparation for visit and have obtained necessary documentation.    Spoke to patient informed her we have received her message and will send it to . once advised on treatment we will reach out to her. Patient stated she understands.

## 2025-03-07 NOTE — TELEPHONE ENCOUNTER
Patient wanted to know what medication she would be on after sx, she wanted to make sure if was going to be strong enough for her. Please call her back at [phone]

## 2025-03-10 ENCOUNTER — TELEPHONE (OUTPATIENT)
Age: 55
End: 2025-03-10

## 2025-03-10 NOTE — TELEPHONE ENCOUNTER
Patient is requesting a CB to discuss which medication she may be prescribed following HARDWARE REMOVAL, REVISION FIRST METATARSOPHALANGEAL JOINT FUSION, POSSIBLE EHL TENDON TRANSFER on 3/18 with Dr. Bates. Patient can be reached at [phone].     Requesting a CB to discuss RX following surgery.

## 2025-03-11 ENCOUNTER — TELEPHONE (OUTPATIENT)
Age: 55
End: 2025-03-11

## 2025-03-11 DIAGNOSIS — T84.84XA PAINFUL ORTHOPAEDIC HARDWARE: ICD-10-CM

## 2025-03-11 DIAGNOSIS — M66.271 SPONTANEOUS RUPTURE OF EXTENSOR TENDON OF RIGHT FOOT: ICD-10-CM

## 2025-03-11 RX ORDER — ACETAMINOPHEN AND CODEINE PHOSPHATE 300; 15 MG/1; MG/1
1 TABLET ORAL EVERY 4 HOURS PRN
Qty: 28 TABLET | Refills: 0 | Status: SHIPPED | OUTPATIENT
Start: 2025-03-11 | End: 2025-03-18

## 2025-03-11 NOTE — TELEPHONE ENCOUNTER
Patient called requesting for a refill of acetaminophen-Codeine (TYLENOL #2) 300-15 MG per tablet [[id number]]    Order Details  Dose: 1 tablet   Patient preferred pharmacy is [address] - P [phone] - F [phone] [[id number]] . Patient is requesting a call back at [phone]

## 2025-03-11 NOTE — TELEPHONE ENCOUNTER
Identified pt with two pt identifiers (name and ). Reviewed chart in preparation for visit and have obtained necessary documentation.  Spoke with pt and informed medication had been filled and sent to preferred pharmacy pt thanked me.

## 2025-03-11 NOTE — TELEPHONE ENCOUNTER
Patient called inquiring about when Dr Bates will be prescribing medication for after patient sx. Patient is a little concerned that she will have to go a pharmacy after the sx. Patient is inquiring whether or not Dr Bates can prescribe it ahead of time before sx 3/18/25. Patient preferred pharmacy is [place], [place] - [address] - P [phone] - F [phone] [[id number]]

## 2025-03-11 NOTE — TELEPHONE ENCOUNTER
Identified pt with two pt identifiers (name and ). Reviewed chart in preparation for visit and have obtained necessary documentation. Spoke with pt and she stated she would like for medication to be sent to the hospital pharmacy instead so she can pick them up after surgery.

## 2025-03-13 ENCOUNTER — TELEPHONE (OUTPATIENT)
Age: 55
End: 2025-03-13

## 2025-03-13 NOTE — TELEPHONE ENCOUNTER
Pt called wanting to know how late she can take her pain medication (tylenol and codeine) the day before SX. Pt SX with Dr. Bates is for Rt foot on 3/18. Pt CB is [phone].

## 2025-03-14 NOTE — TELEPHONE ENCOUNTER
Placed outbound call to Pt to relay the below message. Pt thanked me and had no further questions.

## 2025-03-17 ENCOUNTER — ANESTHESIA EVENT (OUTPATIENT)
Facility: HOSPITAL | Age: 55
End: 2025-03-17
Payer: COMMERCIAL

## 2025-03-17 NOTE — DISCHARGE INSTRUCTIONS
Incorporated.   Care instructions adapted under license by your healthcare professional. If you have questions about a medical condition or this instruction, always ask your healthcare professional. [employer], [employer] disclaims any warranty or liability for your use of this information.    AT THE COMPLETION OF DISCHARGE INSTRUCTION REVIEW, WE VERIFY:  The discharge information has been reviewed with the patient and caregiver.    Questions have been asked and answered meeting patient and caregiver expectations.  The patient and caregiver verbalized understanding.    Your discharge nurse was Francis MOSER, BRUNILDA-BC       Board Certified - Pain Management      CONTENTS FOUND IN YOUR DISCHARGE ENVELOPE:  [x]     Surgeon and Hospital Discharge Instructions  []     [facility] Care Provider Card    []     Medication & Side Effect Guide            (your newly prescribed medications have been marked/highlighted showing the most common side effects from the classes of drugs on your prescriptions)  [x]     Medication Prescription(s)   x 4         [x]     These have been sent electronically to your pharmacy by your surgeon, - OR - your surgeon has already provided these to you during a previous/pre-op office visit)    []     Pain block and/or block with On-Q Catheter from Anesthesia Service (information included in your instructions above)        []    [facility] Education Information  []     Physical Therapy Prescription  []     Follow-up Appointment Cards  []     Surgery-related Pictures/Media  []     Medical device information sheets/pamphlets from their    []     School/work excuse note.  []     /parent work excuse note.      The following personal items collected during your admission for safe keeping are returned to you:     Dental Appliance:    Vision:    Hearing Aid:    Jewelry:    Clothing:    Other Valuables:    Valuables sent to safe:

## 2025-03-18 ENCOUNTER — APPOINTMENT (OUTPATIENT)
Facility: HOSPITAL | Age: 55
End: 2025-03-18
Attending: ORTHOPAEDIC SURGERY
Payer: COMMERCIAL

## 2025-03-18 ENCOUNTER — HOSPITAL ENCOUNTER (OUTPATIENT)
Facility: HOSPITAL | Age: 55
Setting detail: OUTPATIENT SURGERY
Discharge: HOME | End: 2025-03-18
Attending: ORTHOPAEDIC SURGERY | Admitting: ORTHOPAEDIC SURGERY
Payer: COMMERCIAL

## 2025-03-18 ENCOUNTER — ANESTHESIA (OUTPATIENT)
Facility: HOSPITAL | Age: 55
End: 2025-03-18
Payer: COMMERCIAL

## 2025-03-18 VITALS
BODY MASS INDEX: 45.07 KG/M2 | OXYGEN SATURATION: 94 % | DIASTOLIC BLOOD PRESSURE: 70 MMHG | SYSTOLIC BLOOD PRESSURE: 122 MMHG | TEMPERATURE: 98.6 F | HEART RATE: 88 BPM | RESPIRATION RATE: 12 BRPM | HEIGHT: 66 IN | WEIGHT: 280.43 LBS

## 2025-03-18 DIAGNOSIS — T84.84XA PAINFUL ORTHOPAEDIC HARDWARE: Primary | ICD-10-CM

## 2025-03-18 PROCEDURE — 97530 THERAPEUTIC ACTIVITIES: CPT

## 2025-03-18 PROCEDURE — 3600000002 HC SURGERY LEVEL 2 BASE: Performed by: ORTHOPAEDIC SURGERY

## 2025-03-18 PROCEDURE — 2580000003 HC RX 258: Performed by: NURSE ANESTHETIST, CERTIFIED REGISTERED

## 2025-03-18 PROCEDURE — 6370000000 HC RX 637 (ALT 250 FOR IP): Performed by: ORTHOPAEDIC SURGERY

## 2025-03-18 PROCEDURE — 7100000001 HC PACU RECOVERY - ADDTL 15 MIN: Performed by: ORTHOPAEDIC SURGERY

## 2025-03-18 PROCEDURE — 87205 SMEAR GRAM STAIN: CPT

## 2025-03-18 PROCEDURE — 7100000011 HC PHASE II RECOVERY - ADDTL 15 MIN: Performed by: ORTHOPAEDIC SURGERY

## 2025-03-18 PROCEDURE — 3700000001 HC ADD 15 MINUTES (ANESTHESIA): Performed by: ORTHOPAEDIC SURGERY

## 2025-03-18 PROCEDURE — 87176 TISSUE HOMOGENIZATION CULTR: CPT

## 2025-03-18 PROCEDURE — 2720000010 HC SURG SUPPLY STERILE: Performed by: ORTHOPAEDIC SURGERY

## 2025-03-18 PROCEDURE — 87070 CULTURE OTHR SPECIMN AEROBIC: CPT

## 2025-03-18 PROCEDURE — 6360000002 HC RX W HCPCS: Performed by: NURSE ANESTHETIST, CERTIFIED REGISTERED

## 2025-03-18 PROCEDURE — 97116 GAIT TRAINING THERAPY: CPT

## 2025-03-18 PROCEDURE — 7100000000 HC PACU RECOVERY - FIRST 15 MIN: Performed by: ORTHOPAEDIC SURGERY

## 2025-03-18 PROCEDURE — 3700000000 HC ANESTHESIA ATTENDED CARE: Performed by: ORTHOPAEDIC SURGERY

## 2025-03-18 PROCEDURE — 6360000002 HC RX W HCPCS: Performed by: ORTHOPAEDIC SURGERY

## 2025-03-18 PROCEDURE — 97161 PT EVAL LOW COMPLEX 20 MIN: CPT

## 2025-03-18 PROCEDURE — C1769 GUIDE WIRE: HCPCS | Performed by: ORTHOPAEDIC SURGERY

## 2025-03-18 PROCEDURE — 2709999900 HC NON-CHARGEABLE SUPPLY: Performed by: ORTHOPAEDIC SURGERY

## 2025-03-18 PROCEDURE — 2580000003 HC RX 258: Performed by: ANESTHESIOLOGY

## 2025-03-18 PROCEDURE — 2500000003 HC RX 250 WO HCPCS: Performed by: NURSE ANESTHETIST, CERTIFIED REGISTERED

## 2025-03-18 PROCEDURE — 7100000010 HC PHASE II RECOVERY - FIRST 15 MIN: Performed by: ORTHOPAEDIC SURGERY

## 2025-03-18 PROCEDURE — C1713 ANCHOR/SCREW BN/BN,TIS/BN: HCPCS | Performed by: ORTHOPAEDIC SURGERY

## 2025-03-18 PROCEDURE — 3600000012 HC SURGERY LEVEL 2 ADDTL 15MIN: Performed by: ORTHOPAEDIC SURGERY

## 2025-03-18 PROCEDURE — 87102 FUNGUS ISOLATION CULTURE: CPT

## 2025-03-18 DEVICE — GRAFT BNE SUB 5CC VIABLE MTRX COMPRESSIBLE MOLD RDY TO USE: Type: IMPLANTABLE DEVICE | Site: FOOT | Status: FUNCTIONAL

## 2025-03-18 DEVICE — BONE SCREW, FULLY THREADED, T8
Type: IMPLANTABLE DEVICE | Site: FOOT | Status: FUNCTIONAL
Brand: VARIAX

## 2025-03-18 DEVICE — POLYAXIAL LOCKING PLATE -  MTP CROSS-PLATE, RIGHT (T8)
Type: IMPLANTABLE DEVICE | Site: FOOT | Status: FUNCTIONAL
Brand: ANCHORAGE

## 2025-03-18 DEVICE — GRAFT BNE W22XL20MM THK35X8MM BICORT EVANS WDG FOR OSTEOTMY: Type: IMPLANTABLE DEVICE | Site: FOOT | Status: FUNCTIONAL

## 2025-03-18 DEVICE — CP LAG SCREW (T8)
Type: IMPLANTABLE DEVICE | Site: FOOT | Status: FUNCTIONAL
Brand: ANCHORAGE

## 2025-03-18 RX ORDER — POLYETHYLENE GLYCOL 3350 17 G/17G
17 POWDER, FOR SOLUTION ORAL 2 TIMES DAILY
Qty: 510 G | Refills: 0 | Status: SHIPPED | OUTPATIENT
Start: 2025-03-18 | End: 2025-04-17

## 2025-03-18 RX ORDER — ONDANSETRON 4 MG/1
4 TABLET, FILM COATED ORAL EVERY 8 HOURS PRN
Qty: 60 TABLET | Refills: 0 | Status: SHIPPED | OUTPATIENT
Start: 2025-03-18

## 2025-03-18 RX ORDER — DIPHENHYDRAMINE HYDROCHLORIDE 50 MG/ML
12.5 INJECTION, SOLUTION INTRAMUSCULAR; INTRAVENOUS
Status: DISCONTINUED | OUTPATIENT
Start: 2025-03-18 | End: 2025-03-18 | Stop reason: HOSPADM

## 2025-03-18 RX ORDER — SODIUM CHLORIDE, SODIUM LACTATE, POTASSIUM CHLORIDE, CALCIUM CHLORIDE 600; 310; 30; 20 MG/100ML; MG/100ML; MG/100ML; MG/100ML
INJECTION, SOLUTION INTRAVENOUS CONTINUOUS
Status: DISCONTINUED | OUTPATIENT
Start: 2025-03-18 | End: 2025-03-18 | Stop reason: HOSPADM

## 2025-03-18 RX ORDER — SODIUM CHLORIDE 9 MG/ML
INJECTION, SOLUTION INTRAVENOUS CONTINUOUS
Status: DISCONTINUED | OUTPATIENT
Start: 2025-03-18 | End: 2025-03-18 | Stop reason: HOSPADM

## 2025-03-18 RX ORDER — DEXMEDETOMIDINE HYDROCHLORIDE 100 UG/ML
INJECTION, SOLUTION INTRAVENOUS
Status: DISCONTINUED | OUTPATIENT
Start: 2025-03-18 | End: 2025-03-18 | Stop reason: SDUPTHER

## 2025-03-18 RX ORDER — MIDAZOLAM HYDROCHLORIDE 1 MG/ML
INJECTION, SOLUTION INTRAMUSCULAR; INTRAVENOUS
Status: DISCONTINUED | OUTPATIENT
Start: 2025-03-18 | End: 2025-03-18 | Stop reason: SDUPTHER

## 2025-03-18 RX ORDER — DEXAMETHASONE SODIUM PHOSPHATE 10 MG/ML
8 INJECTION, SOLUTION INTRAMUSCULAR; INTRAVENOUS ONCE
Status: COMPLETED | OUTPATIENT
Start: 2025-03-18 | End: 2025-03-18

## 2025-03-18 RX ORDER — RIVAROXABAN 10 MG/1
10 TABLET, FILM COATED ORAL
Qty: 10 TABLET | Refills: 0 | Status: SHIPPED | OUTPATIENT
Start: 2025-03-18 | End: 2025-03-28

## 2025-03-18 RX ORDER — TRAMADOL HYDROCHLORIDE 50 MG/1
50 TABLET ORAL EVERY 4 HOURS PRN
Qty: 42 TABLET | Refills: 0 | Status: SHIPPED | OUTPATIENT
Start: 2025-03-18 | End: 2025-03-25

## 2025-03-18 RX ORDER — PROPOFOL 10 MG/ML
INJECTION, EMULSION INTRAVENOUS
Status: DISCONTINUED | OUTPATIENT
Start: 2025-03-18 | End: 2025-03-18 | Stop reason: SDUPTHER

## 2025-03-18 RX ORDER — LIDOCAINE HYDROCHLORIDE 10 MG/ML
1 INJECTION, SOLUTION EPIDURAL; INFILTRATION; INTRACAUDAL; PERINEURAL
Status: DISCONTINUED | OUTPATIENT
Start: 2025-03-18 | End: 2025-03-18 | Stop reason: HOSPADM

## 2025-03-18 RX ORDER — ROPIVACAINE HYDROCHLORIDE 5 MG/ML
INJECTION, SOLUTION EPIDURAL; INFILTRATION; PERINEURAL PRN
Status: DISCONTINUED | OUTPATIENT
Start: 2025-03-18 | End: 2025-03-18 | Stop reason: HOSPADM

## 2025-03-18 RX ORDER — SODIUM CHLORIDE 0.9 % (FLUSH) 0.9 %
5-40 SYRINGE (ML) INJECTION EVERY 12 HOURS SCHEDULED
Status: DISCONTINUED | OUTPATIENT
Start: 2025-03-18 | End: 2025-03-18 | Stop reason: HOSPADM

## 2025-03-18 RX ORDER — ACETAMINOPHEN 500 MG
1000 TABLET ORAL ONCE
Status: COMPLETED | OUTPATIENT
Start: 2025-03-18 | End: 2025-03-18

## 2025-03-18 RX ORDER — TRAMADOL HYDROCHLORIDE 50 MG/1
50 TABLET ORAL EVERY 6 HOURS PRN
Status: DISCONTINUED | OUTPATIENT
Start: 2025-03-18 | End: 2025-03-18 | Stop reason: HOSPADM

## 2025-03-18 RX ORDER — FENTANYL CITRATE 50 UG/ML
100 INJECTION, SOLUTION INTRAMUSCULAR; INTRAVENOUS
Status: DISCONTINUED | OUTPATIENT
Start: 2025-03-18 | End: 2025-03-18 | Stop reason: HOSPADM

## 2025-03-18 RX ORDER — NALOXONE HYDROCHLORIDE 0.4 MG/ML
INJECTION, SOLUTION INTRAMUSCULAR; INTRAVENOUS; SUBCUTANEOUS PRN
Status: DISCONTINUED | OUTPATIENT
Start: 2025-03-18 | End: 2025-03-18 | Stop reason: HOSPADM

## 2025-03-18 RX ORDER — SODIUM CHLORIDE 0.9 % (FLUSH) 0.9 %
5-40 SYRINGE (ML) INJECTION PRN
Status: DISCONTINUED | OUTPATIENT
Start: 2025-03-18 | End: 2025-03-18 | Stop reason: HOSPADM

## 2025-03-18 RX ORDER — CLINDAMYCIN PHOSPHATE 900 MG/50ML
900 INJECTION, SOLUTION INTRAVENOUS ONCE
Status: COMPLETED | OUTPATIENT
Start: 2025-03-18 | End: 2025-03-18

## 2025-03-18 RX ORDER — ONDANSETRON 2 MG/ML
INJECTION INTRAMUSCULAR; INTRAVENOUS
Status: DISCONTINUED | OUTPATIENT
Start: 2025-03-18 | End: 2025-03-18 | Stop reason: SDUPTHER

## 2025-03-18 RX ORDER — SODIUM CHLORIDE, SODIUM LACTATE, POTASSIUM CHLORIDE, CALCIUM CHLORIDE 600; 310; 30; 20 MG/100ML; MG/100ML; MG/100ML; MG/100ML
INJECTION, SOLUTION INTRAVENOUS
Status: DISCONTINUED | OUTPATIENT
Start: 2025-03-18 | End: 2025-03-18 | Stop reason: SDUPTHER

## 2025-03-18 RX ORDER — MIDAZOLAM HYDROCHLORIDE 2 MG/2ML
2 INJECTION, SOLUTION INTRAMUSCULAR; INTRAVENOUS
Status: DISCONTINUED | OUTPATIENT
Start: 2025-03-18 | End: 2025-03-18 | Stop reason: HOSPADM

## 2025-03-18 RX ORDER — ONDANSETRON 2 MG/ML
4 INJECTION INTRAMUSCULAR; INTRAVENOUS
Status: DISCONTINUED | OUTPATIENT
Start: 2025-03-18 | End: 2025-03-18 | Stop reason: HOSPADM

## 2025-03-18 RX ORDER — LIDOCAINE HYDROCHLORIDE 20 MG/ML
INJECTION, SOLUTION EPIDURAL; INFILTRATION; INTRACAUDAL; PERINEURAL
Status: DISCONTINUED | OUTPATIENT
Start: 2025-03-18 | End: 2025-03-18 | Stop reason: SDUPTHER

## 2025-03-18 RX ORDER — FENTANYL CITRATE 50 UG/ML
INJECTION, SOLUTION INTRAMUSCULAR; INTRAVENOUS
Status: DISCONTINUED | OUTPATIENT
Start: 2025-03-18 | End: 2025-03-18 | Stop reason: SDUPTHER

## 2025-03-18 RX ORDER — SODIUM CHLORIDE 9 MG/ML
INJECTION, SOLUTION INTRAVENOUS PRN
Status: DISCONTINUED | OUTPATIENT
Start: 2025-03-18 | End: 2025-03-18 | Stop reason: HOSPADM

## 2025-03-18 RX ADMIN — ACETAMINOPHEN 1000 MG: 500 TABLET ORAL at 07:18

## 2025-03-18 RX ADMIN — FENTANYL CITRATE 50 MCG: 50 INJECTION, SOLUTION INTRAMUSCULAR; INTRAVENOUS at 07:58

## 2025-03-18 RX ADMIN — DEXAMETHASONE SODIUM PHOSPHATE 4 MG: 10 INJECTION INTRAMUSCULAR; INTRAVENOUS at 07:42

## 2025-03-18 RX ADMIN — LIDOCAINE HYDROCHLORIDE 80 MG: 20 INJECTION, SOLUTION EPIDURAL; INFILTRATION; INTRACAUDAL; PERINEURAL at 07:31

## 2025-03-18 RX ADMIN — CLINDAMYCIN PHOSPHATE 900 MG: 900 INJECTION, SOLUTION INTRAVENOUS at 07:40

## 2025-03-18 RX ADMIN — PROPOFOL 200 MG: 10 INJECTION, EMULSION INTRAVENOUS at 07:33

## 2025-03-18 RX ADMIN — ONDANSETRON 4 MG: 2 INJECTION, SOLUTION INTRAMUSCULAR; INTRAVENOUS at 07:42

## 2025-03-18 RX ADMIN — MIDAZOLAM HYDROCHLORIDE 3 MG: 1 INJECTION, SOLUTION INTRAMUSCULAR; INTRAVENOUS at 07:25

## 2025-03-18 RX ADMIN — FENTANYL CITRATE 50 MCG: 50 INJECTION, SOLUTION INTRAMUSCULAR; INTRAVENOUS at 07:31

## 2025-03-18 RX ADMIN — SODIUM CHLORIDE: 9 INJECTION, SOLUTION INTRAVENOUS at 07:25

## 2025-03-18 RX ADMIN — DEXMEDETOMIDINE 6 MCG: 100 INJECTION, SOLUTION INTRAVENOUS at 08:19

## 2025-03-18 RX ADMIN — MIDAZOLAM HYDROCHLORIDE 2 MG: 1 INJECTION, SOLUTION INTRAMUSCULAR; INTRAVENOUS at 07:31

## 2025-03-18 RX ADMIN — DEXMEDETOMIDINE 2 MCG: 100 INJECTION, SOLUTION INTRAVENOUS at 08:39

## 2025-03-18 RX ADMIN — DEXMEDETOMIDINE 6 MCG: 100 INJECTION, SOLUTION INTRAVENOUS at 08:03

## 2025-03-18 RX ADMIN — SODIUM CHLORIDE, SODIUM LACTATE, POTASSIUM CHLORIDE, AND CALCIUM CHLORIDE: 600; 310; 30; 20 INJECTION, SOLUTION INTRAVENOUS at 09:16

## 2025-03-18 ASSESSMENT — PAIN SCALES - GENERAL
PAINLEVEL_OUTOF10: 0
PAINLEVEL_OUTOF10: 3
PAINLEVEL_OUTOF10: 2
PAINLEVEL_OUTOF10: 0
PAINLEVEL_OUTOF10: 0

## 2025-03-18 ASSESSMENT — PAIN DESCRIPTION - LOCATION: LOCATION: FOOT

## 2025-03-18 ASSESSMENT — PAIN DESCRIPTION - DESCRIPTORS: DESCRIPTORS: ACHING

## 2025-03-18 ASSESSMENT — PAIN DESCRIPTION - PAIN TYPE: TYPE: CHRONIC PAIN

## 2025-03-18 ASSESSMENT — PAIN DESCRIPTION - ORIENTATION: ORIENTATION: RIGHT

## 2025-03-18 NOTE — PERIOP NOTE
POST ANESTHESIA CARE    DISCHARGE / TRANSFER NOTE  Bisi Ahmadi was:    [x] discharged        via   [x] Wheelchair          to [x] Private Vehicle     [] transferred    [] Carried    [] Taxi / Vehicle \"for Hire\"  [] Walk out   [] Ambulance / Medical Transportation   [] Stretcher   [] Hospital room _**_           [] Bed      Patient was escorted by:      [x] Nurse   [] Volunteer  [] Transporter / Technician  [] Parent      [] Spouse / Family /      Patient verbalized     [x] appreciation and was very pleased with care received   [] frustration with care received       throughout their stay.    Patient was discharged in     [x] pleasant mood  [] sad mood  [] mad mood .     Pain at discharge/transfer was      +  /10.    Discharge, medication and follow-up instructions were verbalized as understood prior to discharge  (if applicable for same-day procedures being discharged.)    All personal belongings have been returned to patient, and patient/family verbally confirm receiving belongings as all present.    
Pending PT eval. Orders previously released and no PT signed into case yet. Spoke with Sebastian Lin PT and he said he will find out which PT is assigned and send down    OF NOTE: when getting dressed, Pt informed of NON-WEIGHT BEARING status several times and required repeated reinforcement to not put weight on foot. Friend/caregivers, while transiting from weighting room to bedside, stated same non-compliance as possible reason initial surgical intervention failed.  
E-MAR for medications administered.  []Faces, Hennessy/Baker    Note assessments documented in flowsheets; any assessment variants to be found in comments or narrative perioperative nurse notes.       Post-anesthesia care now assumed by Francis MOSER, RN-BC

## 2025-03-18 NOTE — ANESTHESIA POSTPROCEDURE EVALUATION
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    Niall Phase I: Niall Score: 8    Niall Phase II:      Anesthesia Post Evaluation    Patient location during evaluation: PACU  Patient participation: complete - patient participated  Level of consciousness: awake and alert  Airway patency: patent  Nausea & Vomiting: no vomiting and no nausea  Cardiovascular status: hemodynamically stable  Respiratory status: acceptable  Hydration status: stable  Comments: Patient seen and examined.  Ready for discharge from PACU.  Pain management: adequate and satisfactory to patient    No notable events documented.

## 2025-03-18 NOTE — H&P
Update History & Physical    The patient's History and Physical of March 5, 2025 was reviewed with the patient and I examined the patient. There was no change. The surgical site was confirmed by the patient and me.       Plan: The risks, benefits, expected outcome, and alternative to the recommended procedure have been discussed with the patient. Patient understands and wants to proceed with the procedure.     Electronically signed by Malcolm Bates DO on 3/18/2025 at 6:46 AM

## 2025-03-18 NOTE — PROGRESS NOTES
PHYSICAL THERAPY EVALUATION/DISCHARGE    Patient: Bisi Ahmadi (54 y.o. 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 Patient has a ramp and wheel chair at home. Reviewed all safety precaution and home exercise program with the patient, verbalized understanding, clear to go home per Physical Therapy perspective. Skilled physical therapy is not indicated at this time.   Functional Outcome Measure:  The patient scored 20/24 on the [facility] outcome measure   Further skilled acute physical therapy is not indicated at this time.  Patient is cleared for discharge from PT standpoint:  YES [x]     NO []       PLAN :  Recommendation for discharge: (in order for the patient to meet his/her long term goals):   No skilled physical therapy    Other factors to consider for discharge: no additional factors    IF patient discharges home will need the following DME: patient owns DME required for discharge       SUBJECTIVE:   Patient stated “I have ramp at home.”    OBJECTIVE DATA SUMMARY:     Past Medical History:   Diagnosis Date    Adhesive capsulitis of right shoulder     Baker's cyst of knee, left     Cervical radiculopathy     COVID-19     Degeneration of lumbar intervertebral disc     Dysthymic disorder     Generalized anxiety disorder     Headache     History of suicide attempt     Hypertension     Lichen sclerosus of female genitalia     Lumbar radiculopathy     Migraine     Obesity     Osteoarthritis of right knee     Patellar tracking disorder of left knee

## 2025-03-18 NOTE — DISCHARGE SUMMARY
Date of Admission: 3/18/2025  Date of Discharge: 3/18/2025    Attending on admission and discharge: Dr. 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  The patient tolerated the procedure well and was taken to the pacu for further observation and treatment.  The patient was maintained on IV fluids until tolerating a PO diet. They were also maintained on sequential compression devices and DVT prophylaxis.  The diet was advanced as tolerated.  The patient was mobilized. There were no complications during the course of the hospital stay, and the patient was deemed medically stable for discharge to home.  After consultation with physical therapy, the patient was cleared for discharge.    Discharge instructions:  The patient should not be in a pool or tub, or otherwise immerse the wound in water.  The patient has been counseled on the importance of mobilizing and moving at least every two hours to help prevent blood clots.  The patient should call Dr. Bates's office or go to an emergency department if they note a fever over 101.1 degrees fahrenheit, more or unrelieved pain, more or new swelling at the operative site, or any drainage from the wound.    Condition at Discharge: Stable    Discharge medications:  Resume regular home medications  Additional medications to be prescribed on discharge    Miralax  Tramadol  Zofran  xarelto      Follow up instructions: The patient should follow up in 2  weeks for a wound check and xrays with Dr. Bates.

## 2025-03-18 NOTE — ANESTHESIA PRE PROCEDURE
\"POCCL\", \"POCBUN\", \"POCHEMO\", \"POCHCT\" in the last 72 hours.    Coags:   Lab Results   Component Value Date/Time    PROTIME 10.6 01/06/2023 09:30 AM    INR 1.0 01/06/2023 09:30 AM       HCG (If Applicable): No results found for: \"PREGTESTUR\", \"PREGSERUM\", \"HCG\", \"HCGQUANT\"     ABGs: No results found for: \"PHART\", \"PO2ART\", \"HUX9XRN\", \"WXO0LDJ\", \"BEART\", \"H5FWSUWS\"     Type & Screen (If Applicable):  No results found for: \"ABORH\", \"LABANTI\"    Drug/Infectious Status (If Applicable):  No results found for: \"HIV\", \"HEPCAB\"    COVID-19 Screening (If Applicable): No results found for: \"COVID19\"        Anesthesia Evaluation  Patient summary reviewed and Nursing notes reviewed  Airway: Mallampati: IV     Neck ROM: limited  Mouth opening: > = 3 FB   Dental:    (+) edentulous      Pulmonary:Negative Pulmonary ROS breath sounds clear to auscultation                             Cardiovascular:  Exercise tolerance: good (>4 METS)          Rhythm: regular  Rate: normal                    Neuro/Psych:   (+) psychiatric history:depression/anxiety             GI/Hepatic/Renal:   (+) PUD, morbid obesity          Endo/Other:    (+) : arthritis:..                 Abdominal:             Vascular: negative vascular ROS.         Other Findings:       Anesthesia Plan      general     ASA 3       Induction: intravenous.    MIPS: Postoperative opioids intended.  Anesthetic plan and risks discussed with patient.                    Tomasz Valle,    3/18/2025

## 2025-03-22 LAB
BACTERIA SPEC CULT: NORMAL
GRAM STN SPEC: NORMAL
GRAM STN SPEC: NORMAL
SERVICE CMNT-IMP: NORMAL

## 2025-03-24 LAB
BACTERIA SPEC CULT: NORMAL
SERVICE CMNT-IMP: NORMAL

## 2025-03-28 ENCOUNTER — TELEPHONE (OUTPATIENT)
Age: 55
End: 2025-03-28

## 2025-03-28 DIAGNOSIS — M66.271 SPONTANEOUS RUPTURE OF EXTENSOR TENDON OF RIGHT FOOT: ICD-10-CM

## 2025-03-28 DIAGNOSIS — T84.84XA PAINFUL ORTHOPAEDIC HARDWARE: Primary | ICD-10-CM

## 2025-03-28 RX ORDER — ACETAMINOPHEN 325 MG/1
1000 TABLET ORAL EVERY 8 HOURS PRN
Status: SHIPPED | OUTPATIENT
Start: 2025-03-28

## 2025-03-28 NOTE — TELEPHONE ENCOUNTER
Patient is requesting a refill of Pain RX   acetaminophen (TYLENOL) tablet 1,000 mg be sent to her preferred pharmacy of [place], [place] - [address] [place] [address] RD - P [phone] - f [phone] [[id number]]. Patient can be reached at [phone].     Refill request TYLENOL to pharmacy on file

## 2025-03-28 NOTE — TELEPHONE ENCOUNTER
Called patient to inform she may take a shower and her meds were filled and sent to preferred pharmacy with no answer LVM

## 2025-03-28 NOTE — TELEPHONE ENCOUNTER
Patient called inquiring whether or not patient could take a shower the morning of her scheduled office visit, on 3/31/25. Patient is requesting a call back at [phone]

## 2025-03-28 NOTE — TELEPHONE ENCOUNTER
Identified pt with two pt identifiers (name and ). Reviewed chart in preparation for visit and have obtained necessary documentation.  Confirmed medication and pharmacy. Informed patient to allow provider 48-72 hours.

## 2025-03-29 ENCOUNTER — TELEPHONE (OUTPATIENT)
Age: 55
End: 2025-03-29

## 2025-03-29 NOTE — TELEPHONE ENCOUNTER
Patient called me today on [employer] regarding lack of instruction on what to do with the bloody surgical dressing that came out of the splint this morning. She also inquired if her pain meds were called in to her pharmacy since she did not get a call back. I instructed her to bathe the area using antibacterial soap and apply a large bandaid over the surgical site. She may take 2-3 Tylenol alternating every 3-4 hours with 2 Tylenol. All questions were answered.

## 2025-03-29 NOTE — ASSESSMENT & PLAN NOTE
Patient called me via [employer] today at 11:45am 3/29/2025, regarding the post-op surgical dressing and pain medications. She states she had no instructions on post-op care, and her bloody surgical dressing came out of the splint. I instructed her to wash the area with anti-bacterial soap and apply a bandaid over the surgical site. She never received a call yesterday regarding her pain meds that were supposed to be called in to her pharmacy. I instructed her to take 2-3 Tylenol tablets alternating with 2 Advil every 3-4 hours. She will call the office on Monday. All questions were answered.

## 2025-03-31 ENCOUNTER — OFFICE VISIT (OUTPATIENT)
Age: 55
End: 2025-03-31

## 2025-03-31 DIAGNOSIS — T84.84XA PAINFUL ORTHOPAEDIC HARDWARE: ICD-10-CM

## 2025-03-31 DIAGNOSIS — Z48.89 AFTERCARE FOLLOWING SURGERY: Primary | ICD-10-CM

## 2025-03-31 LAB
BACTERIA SPEC CULT: NORMAL
SERVICE CMNT-IMP: NORMAL

## 2025-03-31 PROCEDURE — 99024 POSTOP FOLLOW-UP VISIT: CPT | Performed by: ORTHOPAEDIC SURGERY

## 2025-03-31 RX ORDER — ACETAMINOPHEN AND CODEINE PHOSPHATE 300; 30 MG/1; MG/1
1 TABLET ORAL EVERY 6 HOURS PRN
Qty: 28 TABLET | Refills: 0 | Status: SHIPPED | OUTPATIENT
Start: 2025-03-31 | End: 2025-04-07

## 2025-03-31 ASSESSMENT — PATIENT HEALTH QUESTIONNAIRE - PHQ9
1. LITTLE INTEREST OR PLEASURE IN DOING THINGS: NOT AT ALL
SUM OF ALL RESPONSES TO PHQ QUESTIONS 1-9: 0
SUM OF ALL RESPONSES TO PHQ QUESTIONS 1-9: 0
2. FEELING DOWN, DEPRESSED OR HOPELESS: NOT AT ALL
SUM OF ALL RESPONSES TO PHQ QUESTIONS 1-9: 0
SUM OF ALL RESPONSES TO PHQ QUESTIONS 1-9: 0

## 2025-03-31 NOTE — PROGRESS NOTES
Identified pt with two pt identifiers (name and ). Reviewed chart in preparation for visit and have obtained necessary documentation.    Bisi Ahmadi is a 54 y.o. female Post-Op Check (2 wk PO sx 3/18/25 Right Foot)  .    There were no vitals filed for this visit.       1. Have you been to the ER, urgent care clinic since your last visit?  Hospitalized since your last visit?  no     2. Have you seen or consulted any other health care providers outside of the [facility] since your last visit?  Include any pap smears or colon screening.  no

## 2025-03-31 NOTE — PROGRESS NOTES
is here for a follow up visit from a right great toe revision 1st MTPJ fusion with EHL repair.    Pain has been appropriate since surgery, no major medical complications since surgery.  She has been noncompliant with her boot wear, and comes in wearing a sock.    Current Outpatient Medications on File Prior to Visit   Medication Sig Dispense Refill    polyethylene glycol (GLYCOLAX) 17 GM/SCOOP powder Take 17 g by mouth 2 times daily 510 g 0    ondansetron (ZOFRAN) 4 MG tablet Take 1 tablet by mouth every 8 hours as needed for Nausea or Vomiting 60 tablet 0    rivaroxaban (XARELTO) 10 MG TABS tablet Take 1 tablet by mouth daily (with breakfast) for 10 days 10 tablet 0    promethazine (PHENERGAN) 50 MG tablet TAKE ONE TABLET BY MOUTH EVERY 6 TO 8 HOURS AS NEEDED FOR NAUSEA AND VOMITING 30 tablet 4    SUMAtriptan (IMITREX) 100 MG tablet TAKE ONE TABLET BY MOUTH AT ONSET OF HEADACHE -- MAY REPEAT 1 TABLET AFTER 2 HOURS -- MAX DAILY AMOUNT IS 2 TABLETS 9 tablet 3    clonazePAM (KLONOPIN) 1 MG tablet Take 1 tablet by mouth 3 times daily as needed for Anxiety.      cloZAPine (CLOZARIL) 100 MG tablet Take 3 tablets by mouth nightly      divalproex (DEPAKOTE ER) 500 MG extended release tablet Take 2 tablets by mouth at bedtime      DULoxetine (CYMBALTA) 60 MG extended release capsule Take 1 capsule by mouth daily      hydrOXYzine HCl (ATARAX) 50 MG tablet Take 1 tablet by mouth 3 times daily as needed      loperamide (IMODIUM) 2 MG capsule Take 1 capsule by mouth as needed       Current Facility-Administered Medications on File Prior to Visit   Medication Dose Route Frequency Provider Last Rate Last Admin    acetaminophen (TYLENOL) tablet 975 mg  975 mg Oral Q8H PRN            ROS:  General: denies agitation, major chest pain, unexpected weakness  Patient states pain  Skin: healing wound is without issue or drainage   Strength: appropriate weakness of involved extremity is resolving since surgery      Physical

## 2025-04-07 LAB
BACTERIA SPEC CULT: NORMAL
SERVICE CMNT-IMP: NORMAL

## 2025-04-10 ENCOUNTER — OFFICE VISIT (OUTPATIENT)
Age: 55
End: 2025-04-10

## 2025-04-10 DIAGNOSIS — Z48.89 AFTERCARE FOLLOWING SURGERY: Primary | ICD-10-CM

## 2025-04-10 PROCEDURE — 99024 POSTOP FOLLOW-UP VISIT: CPT | Performed by: ORTHOPAEDIC SURGERY

## 2025-04-10 RX ORDER — CELECOXIB 200 MG/1
200 CAPSULE ORAL 2 TIMES DAILY PRN
Qty: 60 CAPSULE | Refills: 0 | Status: SHIPPED | OUTPATIENT
Start: 2025-04-10

## 2025-04-10 RX ORDER — TRAMADOL HYDROCHLORIDE 50 MG/1
50 TABLET ORAL EVERY 8 HOURS PRN
Qty: 21 TABLET | Refills: 0 | Status: SHIPPED | OUTPATIENT
Start: 2025-04-10 | End: 2025-04-17

## 2025-04-10 ASSESSMENT — PATIENT HEALTH QUESTIONNAIRE - PHQ9
1. LITTLE INTEREST OR PLEASURE IN DOING THINGS: MORE THAN HALF THE DAYS
SUM OF ALL RESPONSES TO PHQ QUESTIONS 1-9: 4
SUM OF ALL RESPONSES TO PHQ QUESTIONS 1-9: 4
2. FEELING DOWN, DEPRESSED OR HOPELESS: MORE THAN HALF THE DAYS
SUM OF ALL RESPONSES TO PHQ QUESTIONS 1-9: 4
SUM OF ALL RESPONSES TO PHQ QUESTIONS 1-9: 4

## 2025-04-10 NOTE — PROGRESS NOTES
Identified pt with two pt identifiers (name and ). Reviewed chart in preparation for visit and have obtained necessary documentation.    Bisi Ahmadi is a 54 y.o. female Post-Op Check ( sx 3/18/25 Right Foot (wound check) (feels like foot is on fire)/)  .    There were no vitals filed for this visit.       1. Have you been to the ER, urgent care clinic since your last visit?  Hospitalized since your last visit?  no     2. Have you seen or consulted any other health care providers outside of the [facility] since your last visit?  Include any pap smears or colon screening.  no

## 2025-04-10 NOTE — PROGRESS NOTES
is here for a follow up visit from a right metatarsal phalangeal joint revision hardware removal and fusion.    Pain has been appropriate since surgery, no major medical complications since surgery.  She states that over the past few days she has had a burning pain throughout her toe, no wound changes.    Current Outpatient Medications on File Prior to Visit   Medication Sig Dispense Refill    polyethylene glycol (GLYCOLAX) 17 GM/SCOOP powder Take 17 g by mouth 2 times daily 510 g 0    ondansetron (ZOFRAN) 4 MG tablet Take 1 tablet by mouth every 8 hours as needed for Nausea or Vomiting 60 tablet 0    rivaroxaban (XARELTO) 10 MG TABS tablet Take 1 tablet by mouth daily (with breakfast) for 10 days 10 tablet 0    promethazine (PHENERGAN) 50 MG tablet TAKE ONE TABLET BY MOUTH EVERY 6 TO 8 HOURS AS NEEDED FOR NAUSEA AND VOMITING 30 tablet 4    SUMAtriptan (IMITREX) 100 MG tablet TAKE ONE TABLET BY MOUTH AT ONSET OF HEADACHE -- MAY REPEAT 1 TABLET AFTER 2 HOURS -- MAX DAILY AMOUNT IS 2 TABLETS 9 tablet 3    clonazePAM (KLONOPIN) 1 MG tablet Take 1 tablet by mouth 3 times daily as needed for Anxiety.      cloZAPine (CLOZARIL) 100 MG tablet Take 3 tablets by mouth nightly      divalproex (DEPAKOTE ER) 500 MG extended release tablet Take 2 tablets by mouth at bedtime      DULoxetine (CYMBALTA) 60 MG extended release capsule Take 1 capsule by mouth daily      hydrOXYzine HCl (ATARAX) 50 MG tablet Take 1 tablet by mouth 3 times daily as needed      loperamide (IMODIUM) 2 MG capsule Take 1 capsule by mouth as needed       Current Facility-Administered Medications on File Prior to Visit   Medication Dose Route Frequency Provider Last Rate Last Admin    acetaminophen (TYLENOL) tablet 975 mg  975 mg Oral Q8H PRN            ROS:  General: denies agitation, major chest pain, unexpected weakness  Patient states and toe  Skin: healing wound is without issue or drainage    Strength: appropriate weakness of involved

## 2025-04-11 RX ORDER — SUMATRIPTAN SUCCINATE 100 MG/1
TABLET ORAL
Qty: 9 TABLET | Refills: 3 | Status: SHIPPED | OUTPATIENT
Start: 2025-04-11

## 2025-04-14 LAB
BACTERIA SPEC CULT: NORMAL
SERVICE CMNT-IMP: NORMAL

## 2025-04-21 ENCOUNTER — OFFICE VISIT (OUTPATIENT)
Age: 55
End: 2025-04-21

## 2025-04-21 DIAGNOSIS — Z48.89 AFTERCARE FOLLOWING SURGERY: Primary | ICD-10-CM

## 2025-04-21 LAB
BACTERIA SPEC CULT: NORMAL
SERVICE CMNT-IMP: NORMAL

## 2025-04-21 PROCEDURE — 99024 POSTOP FOLLOW-UP VISIT: CPT | Performed by: ORTHOPAEDIC SURGERY

## 2025-04-21 RX ORDER — TRAMADOL HYDROCHLORIDE 50 MG/1
50 TABLET ORAL EVERY 8 HOURS PRN
Qty: 21 TABLET | Refills: 0 | Status: SHIPPED | OUTPATIENT
Start: 2025-04-21 | End: 2025-04-28

## 2025-04-21 ASSESSMENT — PATIENT HEALTH QUESTIONNAIRE - PHQ9
SUM OF ALL RESPONSES TO PHQ QUESTIONS 1-9: 1
2. FEELING DOWN, DEPRESSED OR HOPELESS: SEVERAL DAYS
SUM OF ALL RESPONSES TO PHQ QUESTIONS 1-9: 1
1. LITTLE INTEREST OR PLEASURE IN DOING THINGS: NOT AT ALL
SUM OF ALL RESPONSES TO PHQ QUESTIONS 1-9: 1
SUM OF ALL RESPONSES TO PHQ QUESTIONS 1-9: 1

## 2025-04-21 NOTE — PROGRESS NOTES
Identified pt with two pt identifiers (name and ). Reviewed chart in preparation for visit and have obtained necessary documentation.    Bisi Ahmadi is a 54 y.o. female Post-Op Check (3 wk PO sx 3/18/25 Right Foot)  .    There were no vitals filed for this visit.       1. Have you been to the ER, urgent care clinic since your last visit?  Hospitalized since your last visit?  no     2. Have you seen or consulted any other health care providers outside of the [facility] since your last visit?  Include any pap smears or colon screening.  no  
were no vitals taken for this visit.    Dressing: None  Skin: Clean dry and intact  Sensation intact to light touch at level of wound and distally  Strength is not tested  Range of motion is tested  Distal swelling is noted, but appropriate for postoperative course  Distal capillary refill less than 2 seconds      Imaging:    Postoperative imaging: X-rays ordered and self of the right great toe indicate progressive fusion of the first metatarsal phalangeal joint      Assessment: Status post first metatarsal phalangeal joint fusion, intermediate pain    Plan:  Patient will need physical therapy  Wound care was reviewed  Weightbearing will be full through the foot, though she should remove the boot for the time being  Deep Venous Thrombosis Prophylaxis: None     Follow up will be at 3 weeks from now, right foot xrays on follow up

## 2025-05-02 ENCOUNTER — OFFICE VISIT (OUTPATIENT)
Age: 55
End: 2025-05-02
Payer: COMMERCIAL

## 2025-05-02 VITALS
RESPIRATION RATE: 16 BRPM | TEMPERATURE: 98.3 F | DIASTOLIC BLOOD PRESSURE: 64 MMHG | HEIGHT: 66 IN | OXYGEN SATURATION: 97 % | HEART RATE: 75 BPM | BODY MASS INDEX: 43.07 KG/M2 | WEIGHT: 268 LBS | SYSTOLIC BLOOD PRESSURE: 110 MMHG

## 2025-05-02 DIAGNOSIS — R10.84 GENERALIZED ABDOMINAL PAIN: ICD-10-CM

## 2025-05-02 DIAGNOSIS — R73.02 IMPAIRED GLUCOSE TOLERANCE: ICD-10-CM

## 2025-05-02 DIAGNOSIS — F25.9 SCHIZOAFFECTIVE DISORDER, UNSPECIFIED TYPE (HCC): ICD-10-CM

## 2025-05-02 DIAGNOSIS — E66.813 CLASS 3 SEVERE OBESITY DUE TO EXCESS CALORIES WITH BODY MASS INDEX (BMI) OF 40.0 TO 44.9 IN ADULT (HCC): ICD-10-CM

## 2025-05-02 DIAGNOSIS — Z12.11 SCREENING FOR MALIGNANT NEOPLASM OF COLON: ICD-10-CM

## 2025-05-02 DIAGNOSIS — K29.00 ACUTE GASTRITIS WITHOUT HEMORRHAGE, UNSPECIFIED GASTRITIS TYPE: Primary | ICD-10-CM

## 2025-05-02 DIAGNOSIS — Z11.59 ENCOUNTER FOR HCV SCREENING TEST FOR LOW RISK PATIENT: ICD-10-CM

## 2025-05-02 PROCEDURE — 99214 OFFICE O/P EST MOD 30 MIN: CPT | Performed by: FAMILY MEDICINE

## 2025-05-02 RX ORDER — OMEPRAZOLE 20 MG/1
20 CAPSULE, DELAYED RELEASE ORAL
Qty: 30 CAPSULE | Refills: 2 | Status: SHIPPED | OUTPATIENT
Start: 2025-05-02

## 2025-05-02 ASSESSMENT — PATIENT HEALTH QUESTIONNAIRE - PHQ9
1. LITTLE INTEREST OR PLEASURE IN DOING THINGS: NOT AT ALL
6. FEELING BAD ABOUT YOURSELF - OR THAT YOU ARE A FAILURE OR HAVE LET YOURSELF OR YOUR FAMILY DOWN: NOT AT ALL
8. MOVING OR SPEAKING SO SLOWLY THAT OTHER PEOPLE COULD HAVE NOTICED. OR THE OPPOSITE, BEING SO FIGETY OR RESTLESS THAT YOU HAVE BEEN MOVING AROUND A LOT MORE THAN USUAL: NOT AT ALL
SUM OF ALL RESPONSES TO PHQ QUESTIONS 1-9: 0
7. TROUBLE CONCENTRATING ON THINGS, SUCH AS READING THE NEWSPAPER OR WATCHING TELEVISION: NOT AT ALL
4. FEELING TIRED OR HAVING LITTLE ENERGY: NOT AT ALL
9. THOUGHTS THAT YOU WOULD BE BETTER OFF DEAD, OR OF HURTING YOURSELF: NOT AT ALL
2. FEELING DOWN, DEPRESSED OR HOPELESS: NOT AT ALL
SUM OF ALL RESPONSES TO PHQ QUESTIONS 1-9: 0
10. IF YOU CHECKED OFF ANY PROBLEMS, HOW DIFFICULT HAVE THESE PROBLEMS MADE IT FOR YOU TO DO YOUR WORK, TAKE CARE OF THINGS AT HOME, OR GET ALONG WITH OTHER PEOPLE: NOT DIFFICULT AT ALL
SUM OF ALL RESPONSES TO PHQ QUESTIONS 1-9: 0
5. POOR APPETITE OR OVEREATING: NOT AT ALL
3. TROUBLE FALLING OR STAYING ASLEEP: NOT AT ALL
SUM OF ALL RESPONSES TO PHQ QUESTIONS 1-9: 0

## 2025-05-02 NOTE — PROGRESS NOTES
[facility]  [address] [place] [address]  Phone: [phone]  Fax: [phone]        Chief Complaint   Patient presents with    Choking     She complains of difficulty swallowing.     Gas     Excess gas    Abdominal Pain     Upper abdomen pain.      She is a 54 y.o. female who presents for acute visit. Usual patient of Dr. Winters.    Complaining of nausea, difficulty swallowing, flatulance, stomach pain.   Says that all of this has been going on for about 1 month, but worsening over the past 10 days.  Says that nausea comes on randomly, but says that she has upper abdominal pain after she eats.  Has not had any vomiting.  There is no reflux.  Has had reflux before.  Takes phenergan.  Has tried pepto bismol without any relief.  There is no diarrha or constipation.  Says that bowel movments are normal.    Has history of abominal surgery.  Had perforated ulcer when she was 2yo.  She also tells me she had cholecystectomy \"years\" ago (chart refelcts that this was done in 2012). However, an US done in 1/2019 shows \"GALLBLADDER: No cholecystolithiasis, gallbladder wall thickening, or pericholecystic fluid.\"  Patient is not sure what to make of this.    Never had colonoscopy.    Has been following with orthopedics recently underwent MTPJ fusion with this group.  She was placed on NSAIDs following this, however, she tells me that she has not been taking them very much.    Has a history of schizophrenia for which she follows with psych. On medication regimen as below.    Prior to Visit Medications    Medication Sig Taking? Authorizing Provider   omeprazole (PRILOSEC) 20 MG delayed release capsule Take 1 capsule by mouth every morning (before breakfast) Yes Rupert Zamudio MD   SUMAtriptan (IMITREX) 100 MG tablet TAKE ONE TABLET BY MOUTH AT ONSET OF HEADACHE -- MAY REPEAT 1 TABLET AFTER 2 HOURS -- MAX DAILY AMOUNT IS 2 TABLETS Yes Andrew Hancock MD   promethazine (PHENERGAN) 50 MG tablet

## 2025-05-02 NOTE — PROGRESS NOTES
Identified pt with two pt identifiers(name and )    Chief Complaint   Patient presents with    Choking     She complains of difficulty swallowing.     Gas     Excess gas    Abdominal Pain     Upper abdomen pain.         Health Maintenance Due   Topic    Hepatitis C screen     Hepatitis B vaccine (1 of 3 - 19+ 3-dose series)    Cervical cancer screen     Lipids     Breast cancer screen     Shingles vaccine (1 of 2)    Colorectal Cancer Screen     COVID-19 Vaccine ( season)       Wt Readings from Last 3 Encounters:   25 121.6 kg (268 lb)   25 127.2 kg (280 lb 6.8 oz)   25 125.8 kg (277 lb 5.4 oz)     Temp Readings from Last 3 Encounters:   25 98.3 °F (36.8 °C) (Temporal)   25 98.6 °F (37 °C) (Oral)   25 97.7 °F (36.5 °C) (Temporal)     BP Readings from Last 3 Encounters:   25 110/64   25 122/70   25 132/73     Pulse Readings from Last 3 Encounters:   25 75   25 88   25 93           Depression Screening:  :         2025    10:44 AM 2025    11:15 AM 4/10/2025    11:27 AM 3/31/2025    11:38 AM 2025     2:41 PM 2/3/2025     2:36 PM 10/10/2024     3:12 PM   PHQ-9 Questionaire   Little interest or pleasure in doing things 0 0 2 0 0 3 0   Feeling down, depressed, or hopeless 0 1 2 0 3 3 2   Trouble falling or staying asleep, or sleeping too much 0    0 0 0   Feeling tired or having little energy 0    3 3 1   Poor appetite or overeating 0    1 0 0   Feeling bad about yourself - or that you are a failure or have let yourself or your family down 0    3 0 3   Trouble concentrating on things, such as reading the newspaper or watching television 0    0 1 1   Moving or speaking so slowly that other people could have noticed. Or the opposite - being so fidgety or restless that you have been moving around a lot more than usual 0    0 0 0   Thoughts that you would be better off dead, or of hurting yourself in some way 0    3 3 2

## 2025-05-12 ENCOUNTER — HOSPITAL ENCOUNTER (OUTPATIENT)
Facility: HOSPITAL | Age: 55
Discharge: HOME | End: 2025-05-15
Attending: FAMILY MEDICINE
Payer: COMMERCIAL

## 2025-05-12 DIAGNOSIS — R10.84 GENERALIZED ABDOMINAL PAIN: ICD-10-CM

## 2025-05-12 DIAGNOSIS — K29.00 ACUTE GASTRITIS WITHOUT HEMORRHAGE, UNSPECIFIED GASTRITIS TYPE: ICD-10-CM

## 2025-05-12 PROCEDURE — 76700 US EXAM ABDOM COMPLETE: CPT

## 2025-05-14 ENCOUNTER — RESULTS FOLLOW-UP (OUTPATIENT)
Age: 55
End: 2025-05-14

## 2025-07-08 DIAGNOSIS — K29.00 ACUTE GASTRITIS WITHOUT HEMORRHAGE, UNSPECIFIED GASTRITIS TYPE: ICD-10-CM

## 2025-07-08 RX ORDER — OMEPRAZOLE 20 MG/1
20 CAPSULE, DELAYED RELEASE ORAL
Qty: 30 CAPSULE | Refills: 2 | OUTPATIENT
Start: 2025-07-08

## 2025-07-16 RX ORDER — SUMATRIPTAN SUCCINATE 100 MG/1
TABLET ORAL
Qty: 9 TABLET | Refills: 3 | Status: SHIPPED | OUTPATIENT
Start: 2025-07-16

## 2025-08-13 DIAGNOSIS — K29.00 ACUTE GASTRITIS WITHOUT HEMORRHAGE, UNSPECIFIED GASTRITIS TYPE: ICD-10-CM

## 2025-08-13 RX ORDER — OMEPRAZOLE 20 MG/1
20 CAPSULE, DELAYED RELEASE ORAL
Qty: 30 CAPSULE | Refills: 2 | Status: SHIPPED | OUTPATIENT
Start: 2025-08-13

## (undated) DEVICE — NON LOCKING SCREW
Type: IMPLANTABLE DEVICE | Site: FOOT | Status: NON-FUNCTIONAL
Brand: ANCHORAGE

## (undated) DEVICE — SOLUTION SCRB 4% CHG RED ANTIMIC SKIN CLN PREOPERATIVE DISP

## (undated) DEVICE — CANNULATED DRILL: Brand: FIXOS

## (undated) DEVICE — ZIMMER® STERILE DISPOSABLE TOURNIQUET CUFF WITH PROTECTIVE SLEEVE AND PLC, DUAL PORT, SINGLE BLADDER, 18 IN. (46 CM)

## (undated) DEVICE — CATH IV AUTOGRD BC BLU 22GA 25 -- INSYTE

## (undated) DEVICE — BAG BELONG PT PERS CLEAR HANDL

## (undated) DEVICE — EXTREMITY-SFMCASU: Brand: MEDLINE INDUSTRIES, INC.

## (undated) DEVICE — DRAPE C ARM W54XL84IN MINI FOR OEC 6800

## (undated) DEVICE — BLADE SAW W5.5XL18MM THK0.38MM CUT THK0.43MM REPL S STL SAG

## (undated) DEVICE — BW-412T DISP COMBO CLEANING BRUSH: Brand: SINGLE USE COMBINATION CLEANING BRUSH

## (undated) DEVICE — Device: Brand: JELCO

## (undated) DEVICE — CONTAINER SPEC 20 ML LID NEUT BUFF FORMALIN 10 % POLYPR STS

## (undated) DEVICE — GLOVE SURG SZ 7 L12IN FNGR THK79MIL GRN LTX FREE

## (undated) DEVICE — SUTURE ETHLN SZ 3-0 L18IN NONABSORBABLE BLK PS-2 L19MM 3/8 1669H

## (undated) DEVICE — 1200 GUARD II KIT W/5MM TUBE W/O VAC TUBE: Brand: GUARDIAN

## (undated) DEVICE — Z DISCONTINUED NO SUB IDED SET EXTN W/ 4 W STPCOCK M SPIN LOK 36IN

## (undated) DEVICE — UNTHREADED GUIDE WIRE: Brand: FIXOS

## (undated) DEVICE — SET ADMIN 16ML TBNG L100IN 2 Y INJ SITE IV PIGGY BK DISP

## (undated) DEVICE — BANDAGE,GAUZE,CONFORMING,4"X75",STRL,LF: Brand: MEDLINE

## (undated) DEVICE — CONCAVE SURFACING REAMER

## (undated) DEVICE — BAG SPEC BIOHZD LF 2MIL 6X10IN -- CONVERT TO ITEM 357326

## (undated) DEVICE — HOLDING PIN: Brand: ANCHORAGE

## (undated) DEVICE — SYRINGE MED 30ML STD CLR PLAS LUERLOCK TIP N CTRL DISP

## (undated) DEVICE — SOLUTION SURG PREP 26 CC PURPREP

## (undated) DEVICE — PACK,BASIC,SIRUS,V: Brand: MEDLINE

## (undated) DEVICE — CANISTER, RIGID, 3000CC: Brand: MEDLINE INDUSTRIES, INC.

## (undated) DEVICE — SPONGE GZ W4XL4IN COT 12 PLY TYP VII WVN C FLD DSGN STERILE

## (undated) DEVICE — KENDALL RADIOLUCENT FOAM MONITORING ELECTRODE -RECTANGULAR SHAPE: Brand: KENDALL

## (undated) DEVICE — SOLUTION IRRIG 1000ML 0.9% SOD CHL USP POUR PLAS BTL

## (undated) DEVICE — TRANSFER SET 3": Brand: MEDLINE INDUSTRIES, INC.

## (undated) DEVICE — NEEDLE HYPO 18GA L1.5IN PNK S STL HUB POLYPR SHLD REG BVL

## (undated) DEVICE — Device

## (undated) DEVICE — SOLIDIFIER FLUID 3000 CC ABSORB

## (undated) DEVICE — CONVEX REAMER

## (undated) DEVICE — Z DISCONTINUED USE 2751540 TUBING IRRIG L10IN DISP PMP ENDOGATOR

## (undated) DEVICE — 2.0 DRILL

## (undated) DEVICE — ENDO CARRY-ON PROCEDURE KIT INCLUDES ENZYMATIC SPONGE, GAUZE, BIOHAZARD LABEL, TRAY, LUBRICANT, DIRTY SCOPE LABEL, WATER LABEL, TRAY, DRAWSTRING PAD, AND DEFENDO 4-PIECE KIT.: Brand: ENDO CARRY-ON PROCEDURE KIT

## (undated) DEVICE — CANNULATED COUNTERSINK: Brand: FIXOS

## (undated) DEVICE — DRESSING,GAUZE,XEROFORM,CURAD,5"X9",ST: Brand: CURAD

## (undated) DEVICE — REM POLYHESIVE ADULT PATIENT RETURN ELECTRODE: Brand: VALLEYLAB

## (undated) DEVICE — GLOVE ORANGE PI 7   MSG9070

## (undated) DEVICE — 3.5 DRILL

## (undated) DEVICE — NEONATAL-ADULT SPO2 SENSOR: Brand: NELLCOR

## (undated) DEVICE — STRIP,CLOSURE,WOUND,MEDI-STRIP,1/2X4: Brand: MEDLINE

## (undated) DEVICE — BONE SCREW, FULLY THREADED, T8
Type: IMPLANTABLE DEVICE | Site: FOOT | Status: NON-FUNCTIONAL
Brand: VARIAX

## (undated) DEVICE — STANDARD DRILL BIT

## (undated) DEVICE — SUTURE N ABSRB L 18 IN SZ 4-0 NDL L 19 MM NYL MONOFILAMENT

## (undated) DEVICE — AIRLIFE™ U/CONNECT-IT OXYGEN TUBING 7 FEET (2.1 M) CRUSH-RESISTANT OXYGEN TUBING, VINYL TIPPED: Brand: AIRLIFE™

## (undated) DEVICE — 3M™ TEGADERM™ TRANSPARENT FILM DRESSING FRAME STYLE, 1626W, 4 IN X 4-3/4 IN (10 CM X 12 CM), 50/CT 4CT/CASE: Brand: 3M™ TEGADERM™

## (undated) DEVICE — SYRINGE MED 20ML STD CLR PLAS LUERLOCK TIP N CTRL DISP

## (undated) DEVICE — GOWN,SIRUS,NONRNF,XLN/2XL,18/CS: Brand: MEDLINE

## (undated) DEVICE — SUTURE MCRYL SZ 3-0 L27IN ABSRB UD L26MM SH 1/2 CIR Y416H

## (undated) DEVICE — BNDG ELAS HK LOOP 4X5YD NS -- MATRIX

## (undated) DEVICE — QUILTED PREMIUM COMFORT UNDERPAD,EXTRA HEAVY: Brand: WINGS

## (undated) DEVICE — GLOVE SURG SZ 85 L12IN FNGR THK79MIL GRN LTX FREE

## (undated) DEVICE — SUTURE VICRYL + SZ 3-0 L27IN ABSRB UD L26MM SH 1/2 CIR VCP416H

## (undated) DEVICE — SOLUTION IRRIG 1000ML H2O PIC PLAS SHATTERPROOF CONTAINER

## (undated) DEVICE — DRAPE,U/ SHT,SPLIT,PLAS,STERIL: Brand: MEDLINE

## (undated) DEVICE — KIT IV STRT W CHLORAPREP PD 1ML

## (undated) DEVICE — GLOVE SURG SZ 85 L12IN FNGR ORTHO 126MIL CRM LTX FREE

## (undated) DEVICE — CONNECTOR TBNG AUX H2O JET DISP FOR OLY 160/180 SER

## (undated) DEVICE — COVER C ARM W36XL28IN BND BG SNAP KOVER

## (undated) DEVICE — SUTURE VCRL SZ 2-0 L27IN ABSRB UD L26MM SH 1/2 CIR J417H

## (undated) DEVICE — CP LAG SCREW (T8)
Type: IMPLANTABLE DEVICE | Site: FOOT | Status: NON-FUNCTIONAL
Brand: ANCHORAGE

## (undated) DEVICE — SUTURE MONOCRYL SZ 3-0 L27IN ABSRB UD L24MM PS-1 3/8 CIR PRIM Y936H

## (undated) DEVICE — CANN NASAL O2 CAPNOGRAPHY AD -- FILTERLINE

## (undated) DEVICE — SOLUTION IRRIG 1000ML 09% SOD CHL USP PIC PLAS CONTAINER

## (undated) DEVICE — SUTURE VICRYL SZ 2-0 L36IN ABSRB UD L36MM CT-1 1/2 CIR J945H

## (undated) DEVICE — 3M™ IOBAN™ 2 ANTIMICROBIAL INCISE DRAPE 6648EZ: Brand: IOBAN™ 2

## (undated) DEVICE — FCPS BX HOT RJ4 2.2MMX240CM -- RADIAL JAW 4 BX/40

## (undated) DEVICE — BANDAGE COMPR M W6INXL10YD WHT BGE VELC E MTRX HK AND LOOP